# Patient Record
Sex: FEMALE | Race: WHITE | NOT HISPANIC OR LATINO | Employment: OTHER | URBAN - METROPOLITAN AREA
[De-identification: names, ages, dates, MRNs, and addresses within clinical notes are randomized per-mention and may not be internally consistent; named-entity substitution may affect disease eponyms.]

---

## 2018-02-23 ENCOUNTER — OFFICE VISIT CONVERTED (OUTPATIENT)
Dept: FAMILY MEDICINE CLINIC | Facility: CLINIC | Age: 38
End: 2018-02-23
Attending: FAMILY MEDICINE

## 2018-08-21 ENCOUNTER — OFFICE VISIT CONVERTED (OUTPATIENT)
Dept: FAMILY MEDICINE CLINIC | Facility: CLINIC | Age: 38
End: 2018-08-21
Attending: FAMILY MEDICINE

## 2018-12-11 ENCOUNTER — CONVERSION ENCOUNTER (OUTPATIENT)
Dept: GENERAL RADIOLOGY | Facility: HOSPITAL | Age: 38
End: 2018-12-11

## 2019-02-05 ENCOUNTER — CONVERSION ENCOUNTER (OUTPATIENT)
Dept: FAMILY MEDICINE CLINIC | Facility: CLINIC | Age: 39
End: 2019-02-05

## 2019-02-05 ENCOUNTER — OFFICE VISIT CONVERTED (OUTPATIENT)
Dept: FAMILY MEDICINE CLINIC | Facility: CLINIC | Age: 39
End: 2019-02-05
Attending: FAMILY MEDICINE

## 2019-02-28 ENCOUNTER — HOSPITAL ENCOUNTER (OUTPATIENT)
Dept: CARDIOLOGY | Facility: HOSPITAL | Age: 39
Discharge: HOME | End: 2019-02-28
Attending: SURGERY

## 2019-03-14 ENCOUNTER — HOSPITAL ENCOUNTER (OUTPATIENT)
Dept: CARDIOLOGY | Facility: HOSPITAL | Age: 39
Discharge: HOME | End: 2019-03-14

## 2019-08-27 ENCOUNTER — HOSPITAL ENCOUNTER (OUTPATIENT)
Dept: LAB | Facility: HOSPITAL | Age: 39
Discharge: HOME | End: 2019-08-27
Attending: FAMILY MEDICINE

## 2019-08-27 ENCOUNTER — OFFICE VISIT CONVERTED (OUTPATIENT)
Dept: FAMILY MEDICINE CLINIC | Facility: CLINIC | Age: 39
End: 2019-08-27
Attending: FAMILY MEDICINE

## 2019-08-27 LAB
ALBUMIN SERPL-MCNC: 4.3 G/DL (ref 3.5–5)
ALBUMIN/GLOB SERPL: 1.4 {RATIO} (ref 1.4–2.6)
ALP SERPL-CCNC: 79 U/L (ref 42–98)
ALT SERPL-CCNC: 52 U/L (ref 10–40)
ANION GAP SERPL CALC-SCNC: 16 MMOL/L (ref 8–19)
AST SERPL-CCNC: 30 U/L (ref 15–50)
BASOPHILS # BLD AUTO: 0.04 10*3/UL (ref 0–0.2)
BASOPHILS NFR BLD AUTO: 0.7 % (ref 0–3)
BILIRUB SERPL-MCNC: <0.15 MG/DL (ref 0.2–1.3)
BUN SERPL-MCNC: 7 MG/DL (ref 5–25)
BUN/CREAT SERPL: 10 {RATIO} (ref 6–20)
CALCIUM SERPL-MCNC: 9.2 MG/DL (ref 8.7–10.4)
CHLORIDE SERPL-SCNC: 104 MMOL/L (ref 99–111)
CHOLEST SERPL-MCNC: 158 MG/DL (ref 107–200)
CHOLEST/HDLC SERPL: 4.1 {RATIO} (ref 3–6)
CONV ABS IMM GRAN: 0.01 10*3/UL (ref 0–0.2)
CONV CO2: 26 MMOL/L (ref 22–32)
CONV IMMATURE GRAN: 0.2 % (ref 0–1.8)
CONV TOTAL PROTEIN: 7.3 G/DL (ref 6.3–8.2)
CREAT UR-MCNC: 0.69 MG/DL (ref 0.5–0.9)
DEPRECATED RDW RBC AUTO: 44.8 FL (ref 36.4–46.3)
EOSINOPHIL # BLD AUTO: 0.1 10*3/UL (ref 0–0.7)
EOSINOPHIL # BLD AUTO: 1.8 % (ref 0–7)
ERYTHROCYTE [DISTWIDTH] IN BLOOD BY AUTOMATED COUNT: 13.2 % (ref 11.7–14.4)
GFR SERPLBLD BASED ON 1.73 SQ M-ARVRAT: >60 ML/MIN/{1.73_M2}
GLOBULIN UR ELPH-MCNC: 3 G/DL (ref 2–3.5)
GLUCOSE SERPL-MCNC: 91 MG/DL (ref 65–99)
HCT VFR BLD AUTO: 45.6 % (ref 37–47)
HDLC SERPL-MCNC: 39 MG/DL (ref 40–60)
HGB BLD-MCNC: 14.7 G/DL (ref 12–16)
LDLC SERPL CALC-MCNC: 100 MG/DL (ref 70–100)
LYMPHOCYTES # BLD AUTO: 1.9 10*3/UL (ref 1–5)
LYMPHOCYTES NFR BLD AUTO: 34.2 % (ref 20–45)
MCH RBC QN AUTO: 29.8 PG (ref 27–31)
MCHC RBC AUTO-ENTMCNC: 32.2 G/DL (ref 33–37)
MCV RBC AUTO: 92.5 FL (ref 81–99)
MONOCYTES # BLD AUTO: 0.4 10*3/UL (ref 0.2–1.2)
MONOCYTES NFR BLD AUTO: 7.2 % (ref 3–10)
NEUTROPHILS # BLD AUTO: 3.11 10*3/UL (ref 2–8)
NEUTROPHILS NFR BLD AUTO: 55.9 % (ref 30–85)
NRBC CBCN: 0 % (ref 0–0.7)
OSMOLALITY SERPL CALC.SUM OF ELEC: 292 MOSM/KG (ref 273–304)
PLATELET # BLD AUTO: 210 10*3/UL (ref 130–400)
PMV BLD AUTO: 10.1 FL (ref 9.4–12.3)
POTASSIUM SERPL-SCNC: 4.3 MMOL/L (ref 3.5–5.3)
RBC # BLD AUTO: 4.93 10*6/UL (ref 4.2–5.4)
SODIUM SERPL-SCNC: 142 MMOL/L (ref 135–147)
T4 FREE SERPL-MCNC: 1 NG/DL (ref 0.9–1.8)
TRIGL SERPL-MCNC: 94 MG/DL (ref 40–150)
TSH SERPL-ACNC: 1.94 M[IU]/L (ref 0.27–4.2)
VIT B12 SERPL-MCNC: 520 PG/ML (ref 211–911)
VLDLC SERPL-MCNC: 19 MG/DL (ref 5–37)
WBC # BLD AUTO: 5.56 10*3/UL (ref 4.8–10.8)

## 2019-09-17 ENCOUNTER — HOSPITAL ENCOUNTER (OUTPATIENT)
Dept: CARDIOLOGY | Facility: HOSPITAL | Age: 39
Discharge: HOME | End: 2019-09-17

## 2019-10-15 ENCOUNTER — HOSPITAL ENCOUNTER (OUTPATIENT)
Dept: CT IMAGING | Facility: HOSPITAL | Age: 39
Discharge: HOME | End: 2019-10-15

## 2019-10-19 ENCOUNTER — HOSPITAL ENCOUNTER (OUTPATIENT)
Dept: URGENT CARE | Facility: CLINIC | Age: 39
Discharge: HOME | End: 2019-10-19

## 2019-10-21 LAB — BACTERIA SPEC AEROBE CULT: NORMAL

## 2019-10-23 ENCOUNTER — OFFICE VISIT CONVERTED (OUTPATIENT)
Dept: FAMILY MEDICINE CLINIC | Facility: CLINIC | Age: 39
End: 2019-10-23
Attending: NURSE PRACTITIONER

## 2019-10-23 ENCOUNTER — HOSPITAL ENCOUNTER (OUTPATIENT)
Dept: GENERAL RADIOLOGY | Facility: HOSPITAL | Age: 39
Discharge: HOME | End: 2019-10-23
Attending: NURSE PRACTITIONER

## 2020-02-25 ENCOUNTER — HOSPITAL ENCOUNTER (OUTPATIENT)
Dept: LAB | Facility: HOSPITAL | Age: 40
Discharge: HOME | End: 2020-02-25
Attending: INTERNAL MEDICINE

## 2020-02-25 ENCOUNTER — OFFICE VISIT CONVERTED (OUTPATIENT)
Dept: FAMILY MEDICINE CLINIC | Facility: CLINIC | Age: 40
End: 2020-02-25
Attending: INTERNAL MEDICINE

## 2020-02-25 ENCOUNTER — CONVERSION ENCOUNTER (OUTPATIENT)
Dept: FAMILY MEDICINE CLINIC | Facility: CLINIC | Age: 40
End: 2020-02-25

## 2020-02-25 LAB
ALBUMIN SERPL-MCNC: 4.3 G/DL (ref 3.5–5)
ALBUMIN/GLOB SERPL: 1.4 {RATIO} (ref 1.4–2.6)
ALP SERPL-CCNC: 74 U/L (ref 42–98)
ALT SERPL-CCNC: 31 U/L (ref 10–40)
ANION GAP SERPL CALC-SCNC: 17 MMOL/L (ref 8–19)
AST SERPL-CCNC: 21 U/L (ref 15–50)
BILIRUB SERPL-MCNC: 0.21 MG/DL (ref 0.2–1.3)
BUN SERPL-MCNC: 8 MG/DL (ref 5–25)
BUN/CREAT SERPL: 11 {RATIO} (ref 6–20)
CALCIUM SERPL-MCNC: 9.1 MG/DL (ref 8.7–10.4)
CHLORIDE SERPL-SCNC: 103 MMOL/L (ref 99–111)
CHOLEST SERPL-MCNC: 131 MG/DL (ref 107–200)
CHOLEST/HDLC SERPL: 3.9 {RATIO} (ref 3–6)
CONV CO2: 25 MMOL/L (ref 22–32)
CONV TOTAL PROTEIN: 7.4 G/DL (ref 6.3–8.2)
CREAT UR-MCNC: 0.74 MG/DL (ref 0.5–0.9)
GFR SERPLBLD BASED ON 1.73 SQ M-ARVRAT: >60 ML/MIN/{1.73_M2}
GLOBULIN UR ELPH-MCNC: 3.1 G/DL (ref 2–3.5)
GLUCOSE SERPL-MCNC: 95 MG/DL (ref 65–99)
HDLC SERPL-MCNC: 34 MG/DL (ref 40–60)
LDLC SERPL CALC-MCNC: 83 MG/DL (ref 70–100)
OSMOLALITY SERPL CALC.SUM OF ELEC: 288 MOSM/KG (ref 273–304)
POTASSIUM SERPL-SCNC: 4.7 MMOL/L (ref 3.5–5.3)
SODIUM SERPL-SCNC: 140 MMOL/L (ref 135–147)
TRIGL SERPL-MCNC: 69 MG/DL (ref 40–150)
VLDLC SERPL-MCNC: 14 MG/DL (ref 5–37)

## 2020-05-06 ENCOUNTER — OFFICE VISIT CONVERTED (OUTPATIENT)
Dept: CARDIOLOGY | Facility: CLINIC | Age: 40
End: 2020-05-06
Attending: INTERNAL MEDICINE

## 2020-05-12 ENCOUNTER — CONVERSION ENCOUNTER (OUTPATIENT)
Dept: CARDIOLOGY | Facility: CLINIC | Age: 40
End: 2020-05-12
Attending: INTERNAL MEDICINE

## 2020-05-15 ENCOUNTER — HOSPITAL ENCOUNTER (OUTPATIENT)
Dept: NUCLEAR MEDICINE | Facility: HOSPITAL | Age: 40
Discharge: HOME | End: 2020-05-15
Attending: INTERNAL MEDICINE

## 2020-06-30 ENCOUNTER — HOSPITAL ENCOUNTER (OUTPATIENT)
Dept: CARDIOLOGY | Facility: HOSPITAL | Age: 40
Discharge: HOME | End: 2020-06-30
Attending: SURGERY

## 2020-07-01 LAB
ANTI-CARDIOLIPIN IGG ANTIBODY: <9 GPL U/ML (ref 0–14)
ANTI-CARDIOLIPIN IGM ANTIBODY: 13 MPL U/ML (ref 0–12)
CONV CARDIOLIPIN IGA: <9 APL U/ML (ref 0–11)
HCYS SERPL-SCNC: 6.9 UMOL/L (ref 3.7–13.9)

## 2020-07-02 ENCOUNTER — HOSPITAL ENCOUNTER (OUTPATIENT)
Dept: CARDIOLOGY | Facility: HOSPITAL | Age: 40
Discharge: HOME | End: 2020-07-02
Attending: SURGERY

## 2020-07-03 LAB
C3 SERPL-MCNC: 161 MG/DL (ref 88–201)
C4 SERPL-MCNC: 21 MG/DL (ref 10–40)
CONV ANA IGG BY ELISA: NORMAL
RHEUMATOID FACT SERPL-ACNC: <10 IU/ML (ref 0–14)

## 2020-07-05 LAB
PROTEIN C-FUNCTIONAL: 121 % (ref 73–180)
PROTEIN S-FUNCTIONAL: 83 % (ref 63–140)

## 2020-07-07 LAB — F5 GENE MUT ANL BLD/T: NORMAL

## 2020-09-08 ENCOUNTER — OFFICE VISIT CONVERTED (OUTPATIENT)
Dept: FAMILY MEDICINE CLINIC | Facility: CLINIC | Age: 40
End: 2020-09-08
Attending: INTERNAL MEDICINE

## 2020-09-10 ENCOUNTER — HOSPITAL ENCOUNTER (OUTPATIENT)
Dept: LAB | Facility: HOSPITAL | Age: 40
Discharge: HOME | End: 2020-09-10
Attending: INTERNAL MEDICINE

## 2020-09-10 LAB
ALBUMIN SERPL-MCNC: 4.2 G/DL (ref 3.5–5)
ALBUMIN/GLOB SERPL: 1.4 {RATIO} (ref 1.4–2.6)
ALP SERPL-CCNC: 74 U/L (ref 42–98)
ALT SERPL-CCNC: 34 U/L (ref 10–40)
ANION GAP SERPL CALC-SCNC: 16 MMOL/L (ref 8–19)
AST SERPL-CCNC: 28 U/L (ref 15–50)
BILIRUB SERPL-MCNC: 0.28 MG/DL (ref 0.2–1.3)
BUN SERPL-MCNC: 5 MG/DL (ref 5–25)
BUN/CREAT SERPL: 6 {RATIO} (ref 6–20)
CALCIUM SERPL-MCNC: 9.1 MG/DL (ref 8.7–10.4)
CHLORIDE SERPL-SCNC: 105 MMOL/L (ref 99–111)
CHOLEST SERPL-MCNC: 155 MG/DL (ref 107–200)
CHOLEST/HDLC SERPL: 4.2 {RATIO} (ref 3–6)
CONV CO2: 24 MMOL/L (ref 22–32)
CONV TOTAL PROTEIN: 7.2 G/DL (ref 6.3–8.2)
CREAT UR-MCNC: 0.83 MG/DL (ref 0.5–0.9)
GFR SERPLBLD BASED ON 1.73 SQ M-ARVRAT: >60 ML/MIN/{1.73_M2}
GLOBULIN UR ELPH-MCNC: 3 G/DL (ref 2–3.5)
GLUCOSE SERPL-MCNC: 96 MG/DL (ref 65–99)
HDLC SERPL-MCNC: 37 MG/DL (ref 40–60)
LDLC SERPL CALC-MCNC: 100 MG/DL (ref 70–100)
OSMOLALITY SERPL CALC.SUM OF ELEC: 289 MOSM/KG (ref 273–304)
POTASSIUM SERPL-SCNC: 4 MMOL/L (ref 3.5–5.3)
SODIUM SERPL-SCNC: 141 MMOL/L (ref 135–147)
TRIGL SERPL-MCNC: 89 MG/DL (ref 40–150)
VLDLC SERPL-MCNC: 18 MG/DL (ref 5–37)

## 2020-09-14 ENCOUNTER — HOSPITAL ENCOUNTER (OUTPATIENT)
Dept: MAMMOGRAPHY | Facility: HOSPITAL | Age: 40
Discharge: HOME | End: 2020-09-14
Attending: INTERNAL MEDICINE

## 2020-10-08 ENCOUNTER — OFFICE VISIT CONVERTED (OUTPATIENT)
Dept: FAMILY MEDICINE CLINIC | Facility: CLINIC | Age: 40
End: 2020-10-08
Attending: INTERNAL MEDICINE

## 2020-11-19 ENCOUNTER — OFFICE VISIT CONVERTED (OUTPATIENT)
Dept: FAMILY MEDICINE CLINIC | Facility: CLINIC | Age: 40
End: 2020-11-19
Attending: INTERNAL MEDICINE

## 2020-11-19 ENCOUNTER — CONVERSION ENCOUNTER (OUTPATIENT)
Dept: FAMILY MEDICINE CLINIC | Facility: CLINIC | Age: 40
End: 2020-11-19

## 2020-12-07 ENCOUNTER — OFFICE VISIT CONVERTED (OUTPATIENT)
Dept: FAMILY MEDICINE CLINIC | Facility: CLINIC | Age: 40
End: 2020-12-07
Attending: PHYSICIAN ASSISTANT

## 2020-12-14 ENCOUNTER — HOSPITAL ENCOUNTER (OUTPATIENT)
Dept: CARDIOLOGY | Facility: HOSPITAL | Age: 40
Discharge: HOME | End: 2020-12-14
Attending: SURGERY

## 2020-12-21 ENCOUNTER — HOSPITAL ENCOUNTER (OUTPATIENT)
Dept: CT IMAGING | Facility: HOSPITAL | Age: 40
Discharge: HOME | End: 2020-12-21
Attending: SURGERY

## 2021-01-11 ENCOUNTER — HOSPITAL ENCOUNTER (OUTPATIENT)
Dept: CARDIOLOGY | Facility: HOSPITAL | Age: 41
Discharge: HOME | End: 2021-01-11
Attending: SURGERY

## 2021-01-13 ENCOUNTER — OFFICE VISIT CONVERTED (OUTPATIENT)
Dept: FAMILY MEDICINE CLINIC | Facility: CLINIC | Age: 41
End: 2021-01-13
Attending: INTERNAL MEDICINE

## 2021-01-13 ENCOUNTER — CONVERSION ENCOUNTER (OUTPATIENT)
Dept: FAMILY MEDICINE CLINIC | Facility: CLINIC | Age: 41
End: 2021-01-13

## 2021-02-19 ENCOUNTER — OFFICE VISIT CONVERTED (OUTPATIENT)
Dept: FAMILY MEDICINE CLINIC | Facility: CLINIC | Age: 41
End: 2021-02-19
Attending: NURSE PRACTITIONER

## 2021-02-19 ENCOUNTER — CONVERSION ENCOUNTER (OUTPATIENT)
Dept: FAMILY MEDICINE CLINIC | Facility: CLINIC | Age: 41
End: 2021-02-19

## 2021-02-19 ENCOUNTER — HOSPITAL ENCOUNTER (OUTPATIENT)
Dept: FAMILY MEDICINE CLINIC | Facility: CLINIC | Age: 41
Discharge: HOME | End: 2021-02-19
Attending: NURSE PRACTITIONER

## 2021-02-19 LAB
APPEARANCE UR: CLEAR
BILIRUB UR QL STRIP: NEGATIVE
BILIRUB UR QL: NEGATIVE
COLOR UR: YELLOW
COLOR UR: YELLOW
CONV BACTERIA: NEGATIVE
CONV CLARITY OF URINE: CLEAR
CONV COLLECTION SOURCE (UA): ABNORMAL
CONV PROTEIN IN URINE BY AUTOMATED TEST STRIP: NEGATIVE
CONV UROBILINOGEN IN URINE BY AUTOMATED TEST STRIP: 0.2 {EHRLICHU}/DL (ref 0.1–1)
CONV UROBILINOGEN IN URINE BY AUTOMATED TEST STRIP: NORMAL
GLUCOSE UR QL: NEGATIVE
GLUCOSE UR QL: NEGATIVE MG/DL
HGB UR QL STRIP: ABNORMAL
HGB UR QL STRIP: NORMAL
KETONES UR QL STRIP: NEGATIVE
KETONES UR QL STRIP: NEGATIVE MG/DL
LEUKOCYTE ESTERASE UR QL STRIP: NEGATIVE
LEUKOCYTE ESTERASE UR QL STRIP: NEGATIVE
NITRITE UR QL STRIP: NEGATIVE
NITRITE UR QL STRIP: NEGATIVE
PH UR STRIP.AUTO: 5.5 [PH]
PH UR STRIP.AUTO: 5.5 [PH] (ref 5–8)
PROT UR QL: NEGATIVE MG/DL
RBC #/AREA URNS HPF: ABNORMAL /[HPF]
SP GR UR: 1.02 (ref 1–1.03)
SP GR UR: 1.03
SQUAMOUS SPT QL MICRO: ABNORMAL /[HPF]
WBC #/AREA URNS HPF: ABNORMAL /[HPF]

## 2021-02-21 LAB — BACTERIA UR CULT: NORMAL

## 2021-02-23 ENCOUNTER — HOSPITAL ENCOUNTER (OUTPATIENT)
Dept: LAB | Facility: HOSPITAL | Age: 41
Discharge: HOME | End: 2021-02-23
Attending: NURSE PRACTITIONER

## 2021-02-23 LAB
25(OH)D3 SERPL-MCNC: 15.4 NG/ML (ref 30–100)
ALBUMIN SERPL-MCNC: 4.1 G/DL (ref 3.5–5)
ALBUMIN/GLOB SERPL: 1.5 {RATIO} (ref 1.4–2.6)
ALP SERPL-CCNC: 69 U/L (ref 42–98)
ALT SERPL-CCNC: 27 U/L (ref 10–40)
ANION GAP SERPL CALC-SCNC: 10 MMOL/L (ref 8–19)
AST SERPL-CCNC: 21 U/L (ref 15–50)
BASOPHILS # BLD AUTO: 0.03 10*3/UL (ref 0–0.2)
BASOPHILS NFR BLD AUTO: 0.7 % (ref 0–3)
BILIRUB SERPL-MCNC: 0.19 MG/DL (ref 0.2–1.3)
BUN SERPL-MCNC: 7 MG/DL (ref 5–25)
BUN/CREAT SERPL: 10 {RATIO} (ref 6–20)
CALCIUM SERPL-MCNC: 8.5 MG/DL (ref 8.7–10.4)
CHLORIDE SERPL-SCNC: 106 MMOL/L (ref 99–111)
CHOLEST SERPL-MCNC: 148 MG/DL (ref 107–200)
CHOLEST/HDLC SERPL: 4 {RATIO} (ref 3–6)
CONV ABS IMM GRAN: 0.01 10*3/UL (ref 0–0.2)
CONV CO2: 24 MMOL/L (ref 22–32)
CONV IMMATURE GRAN: 0.2 % (ref 0–1.8)
CONV TOTAL PROTEIN: 6.9 G/DL (ref 6.3–8.2)
CREAT UR-MCNC: 0.73 MG/DL (ref 0.5–0.9)
DEPRECATED RDW RBC AUTO: 42.7 FL (ref 36.4–46.3)
EOSINOPHIL # BLD AUTO: 0.07 10*3/UL (ref 0–0.7)
EOSINOPHIL # BLD AUTO: 1.5 % (ref 0–7)
ERYTHROCYTE [DISTWIDTH] IN BLOOD BY AUTOMATED COUNT: 12.6 % (ref 11.7–14.4)
GFR SERPLBLD BASED ON 1.73 SQ M-ARVRAT: >60 ML/MIN/{1.73_M2}
GLOBULIN UR ELPH-MCNC: 2.8 G/DL (ref 2–3.5)
GLUCOSE SERPL-MCNC: 93 MG/DL (ref 65–99)
HCT VFR BLD AUTO: 43.6 % (ref 37–47)
HDLC SERPL-MCNC: 37 MG/DL (ref 40–60)
HGB BLD-MCNC: 14.2 G/DL (ref 12–16)
LDLC SERPL CALC-MCNC: 95 MG/DL (ref 70–100)
LYMPHOCYTES # BLD AUTO: 1.53 10*3/UL (ref 1–5)
LYMPHOCYTES NFR BLD AUTO: 33.2 % (ref 20–45)
MCH RBC QN AUTO: 30 PG (ref 27–31)
MCHC RBC AUTO-ENTMCNC: 32.6 G/DL (ref 33–37)
MCV RBC AUTO: 92 FL (ref 81–99)
MONOCYTES # BLD AUTO: 0.33 10*3/UL (ref 0.2–1.2)
MONOCYTES NFR BLD AUTO: 7.2 % (ref 3–10)
NEUTROPHILS # BLD AUTO: 2.64 10*3/UL (ref 2–8)
NEUTROPHILS NFR BLD AUTO: 57.2 % (ref 30–85)
NRBC CBCN: 0 % (ref 0–0.7)
OSMOLALITY SERPL CALC.SUM OF ELEC: 280 MOSM/KG (ref 273–304)
PLATELET # BLD AUTO: 220 10*3/UL (ref 130–400)
PMV BLD AUTO: 10.6 FL (ref 9.4–12.3)
POTASSIUM SERPL-SCNC: 4.3 MMOL/L (ref 3.5–5.3)
RBC # BLD AUTO: 4.74 10*6/UL (ref 4.2–5.4)
SODIUM SERPL-SCNC: 136 MMOL/L (ref 135–147)
TRIGL SERPL-MCNC: 79 MG/DL (ref 40–150)
TSH SERPL-ACNC: 2.3 M[IU]/L (ref 0.27–4.2)
VIT B12 SERPL-MCNC: 430 PG/ML (ref 211–911)
VLDLC SERPL-MCNC: 16 MG/DL (ref 5–37)
WBC # BLD AUTO: 4.61 10*3/UL (ref 4.8–10.8)

## 2021-03-01 ENCOUNTER — HOSPITAL ENCOUNTER (OUTPATIENT)
Dept: FAMILY MEDICINE CLINIC | Facility: CLINIC | Age: 41
Discharge: HOME | End: 2021-03-01
Attending: NURSE PRACTITIONER

## 2021-03-01 ENCOUNTER — CONVERSION ENCOUNTER (OUTPATIENT)
Dept: FAMILY MEDICINE CLINIC | Facility: CLINIC | Age: 41
End: 2021-03-01

## 2021-03-01 LAB
BILIRUB UR QL STRIP: NEGATIVE
COLOR UR: YELLOW
CONV CLARITY OF URINE: CLEAR
CONV PROTEIN IN URINE BY AUTOMATED TEST STRIP: NEGATIVE
CONV UROBILINOGEN IN URINE BY AUTOMATED TEST STRIP: NORMAL
GLUCOSE UR QL: NEGATIVE
HGB UR QL STRIP: NORMAL
KETONES UR QL STRIP: NEGATIVE
LEUKOCYTE ESTERASE UR QL STRIP: NEGATIVE
NITRITE UR QL STRIP: NEGATIVE
PH UR STRIP.AUTO: 5.5 [PH]
SP GR UR: 1.02

## 2021-03-03 LAB — BACTERIA UR CULT: NORMAL

## 2021-03-11 ENCOUNTER — HOSPITAL ENCOUNTER (OUTPATIENT)
Dept: GENERAL RADIOLOGY | Facility: HOSPITAL | Age: 41
Discharge: HOME | End: 2021-03-11
Attending: NURSE PRACTITIONER

## 2021-04-28 ENCOUNTER — CONVERSION ENCOUNTER (OUTPATIENT)
Dept: SURGERY | Facility: CLINIC | Age: 41
End: 2021-04-28

## 2021-04-28 ENCOUNTER — OFFICE VISIT CONVERTED (OUTPATIENT)
Dept: UROLOGY | Facility: CLINIC | Age: 41
End: 2021-04-28
Attending: UROLOGY

## 2021-04-28 ENCOUNTER — HOSPITAL ENCOUNTER (OUTPATIENT)
Dept: SURGERY | Facility: CLINIC | Age: 41
Discharge: HOME | End: 2021-04-28
Attending: UROLOGY

## 2021-04-28 LAB
BILIRUB UR QL STRIP: NEGATIVE
COLOR UR: YELLOW
CONV BACTERIA IN URINE MICRO: 0
CONV CALCIUM OXALATE CRYSTALS /HPF IN URINE SEDIMENT BY MICROSCOPY: 0
CONV CLARITY OF URINE: CLEAR
CONV PROTEIN IN URINE BY AUTOMATED TEST STRIP: NEGATIVE
CONV UROBILINOGEN IN URINE BY AUTOMATED TEST STRIP: 0.2
GLUCOSE UR QL: NEGATIVE
HGB UR QL STRIP: NORMAL
KETONES UR QL STRIP: NEGATIVE
LEUKOCYTE ESTERASE UR QL STRIP: NEGATIVE
NITRITE UR QL STRIP: NEGATIVE
PH UR STRIP.AUTO: 5 [PH]
RBC #/AREA URNS HPF: NORMAL /[HPF]
RENAL EPI CELLS #/AREA URNS HPF: 0 /[HPF]
SP GR UR: 1.02
SQUAMOUS SPT QL MICRO: 0
WBC #/AREA URNS HPF: 0 /[HPF]

## 2021-04-30 LAB — BACTERIA UR CULT: NORMAL

## 2021-05-06 ENCOUNTER — HOSPITAL ENCOUNTER (OUTPATIENT)
Dept: OTHER | Facility: HOSPITAL | Age: 41
Discharge: HOME | End: 2021-05-06
Attending: UROLOGY

## 2021-05-06 LAB — CORTIS AM PEAK SERPL-MCNC: 5.3 UG/DL (ref 6–18.4)

## 2021-05-10 NOTE — PROCEDURES
"   Procedure Note      Patient Name: Janki Krueger   Patient ID: [id number]   Sex: Female   YOB: 1980    Primary Care Provider: Pablo Cristobal DO   Referring Provider: Pablo Cristobal DO    Visit Date: May 12, 2020    Provider: Brandon Perry MD   Location: [facility]   Location Address: [address] [place] [address], [address]   [place]  [id number]   Location Phone: [phone]          FINAL REPORT   TRANSTHORACIC ECHOCARDIOGRAM REPORT    Diagnosis: Chest pain, precordial   Height: 5'4\" Weight: 212 B/P: BLOOD PRESSURE BSA: 2.01   Tech: [facility]   MEASUREMENTS:  RVID (Diastole) : RVID. (NORMAL: 0.7 to 2.4 cm max)   LVID (Systole): 3.1 cm (Diastole): 4.8 cm. (NORMAL: 3.7 - 5.4 cm)   Posterior Wall Thickness (Diastole): 0.7 cm. (NORMAL: 0.8 - 1.1 cm)   Septal Thickness (Diastole): 0.7 cm. (NORMAL: 0.7 - 1.2 cm)   LAID (Systole): 3.1 cm. (NORMAL: 1.9 - 3.8 cm)   Aortic Root Diameter (Diastole): 2.9 cm. (NORMAL: 2.0 - 3.7 cm)   MITRL.VLV.CALVIN.D.D.R: 80 mm/sec-150 mm/sec   DOPPLER: Continuous-wave, pulse-wave, and color-flow examination of the mitral, aortic, and tricuspid valves was performed. There was trace mitral regurgitation. No significant stenosis was identified. Doppler flow velocities were normal. E/A ratio is 1.1. DT= 239 msec. IVRT is 116 msec. E/E' is 0.9.   COMMENTS:  The patient underwent 2-D, M-Mode, and Doppler examination, including pulse-wave, continuous-wave, and color-flow Doppler analysis; the study is technically adequate. The following was observed:   FINDINGS:  AORTIC VALVE: Appeared to be normal. Trileaflet with central closure point. No evidence of any obstruction. No regurgitation.   MITRAL VALVE: Appeared to be normal. No evidence of any obstruction. No regurgitation.   TRICUSPID VALVE: Appeared to be normal.   PULMONIC VALVE: Not well seen.   LEFT ATRIUM: Appeared to be normal. No intracavity masses or clots seen. LA volume index is 16 mL/m2.   AORTIC ROOT: " Appeared to be normal in size.   LEFT VENTRICLE: Appeared to have an overall normal left ventricular systolic function with a normal EF of 55%. There is no evidence of any wall motion abnormalities. No cavitary dilatation. Normal wall thickness.   RIGHT ATRIUM: Appeared to be normal; no obvious evidence of intracavity masses or clots.   RIGHT VENTRICLE: Normal size and function.   PERICARDIUM: Unremarkable. No evidence of effusion.   INFERIOR VENA CAVA: Diameter is 1.1 cm.   Fax: 05/14/2020      CONCLUSION:  1.  Normal ejection fraction of 55%.   2.  Trace mitral regurgitation.       MD CARMEN Sheikh/chandni    This note was transcribed by Nina De.  chandni/carmen  The above service was transcribed by Nina De, and I attest to the accuracy of the note.                   Electronically Signed by: Kelly De-, Other -Author on May 14, 2020 07:56:31 AM  Electronically Co-signed by: Brandon Perry MD -Reviewer on May 20, 2020 03:55:16 PM

## 2021-05-10 NOTE — H&P
History and Physical      Patient Name: Janki Krueger   Patient ID: [id number]   Sex: Female   YOB: 1980    Primary Care Provider: Pablo Cristobal DO   Referring Provider: Pablo Cristobal DO    Visit Date: May 6, 2020    Provider: Brandon Perry MD   Location: [facility]   Location Address: [address] [place] [address], [facility]   [place]  [id number]   Location Phone: [phone]          Chief Complaint  · Chest pain       History Of Present Illness  Consult requested by: Pablo Cristobal DO   Janki Krueger is a 39 year old /White female with chest discomfort on the left side radiating to the arm lasting for minutes, sharp, a 3 out of 10 in intensity, not related to exertion.   PAST MEDICAL HISTORY: Significant for hyperlipidemia, peripheral vascular disease with previous bypass in 2014 as well as graft revisions in 2016 and 2017 with stenting done in the lower extremity, but she does not remember which side.   FAMILY MEDICAL HISTORY: Not significant for premature coronary artery disease.   PSYCHOSOCIAL HISTORY: She is a prior smoker. She uses alcohol rarely and a moderate amount of caffeine.   CURRENT MEDICATIONS: include Gabapentin 300 mg daily; Norco; Pravastatin 40 mg daily; aspirin 81 mg daily; Tylenol PM. The dosage and frequency of the medications were reviewed with the patient.   ALLERGIES: Morphine.       Review of Systems  · Constitutional  o Admits  o : fatigue  o Denies  o : good general health lately, recent weight changes   · Eyes  o Denies  o : double vision  · HENT  o Denies  o : hearing loss or ringing, chronic sinus problem, swollen glands in neck  · Cardiovascular  o Admits  o : chest pain, palpitations (fast, fluttering, or skipping beats), shortness of breath while walking or lying flat  o Denies  o : swelling (feet, ankles, hands)  · Respiratory  o Denies  o : asthma or wheezing, COPD  · Gastrointestinal  o Denies  o : ulcers, nausea or  "vomiting  · Neurologic  o Denies  o : lightheaded or dizzy, stroke, headaches  · Musculoskeletal  o Denies  o : joint pain, back pain  · Endocrine  o Denies  o : thyroid disease, diabetes, heat or cold intolerance, excessive thirst or urination  · Heme-Lymph  o Denies  o : bleeding or bruising tendency, anemia      Vitals  Date Time BP Position Site L\R Cuff Size HR RR TEMP (F) WT  HT  BMI kg/m2 BSA m2 O2 Sat HC       05/06/2020 12:44 /90 Sitting    88 - R   212lbs 0oz 5'  4\" 36.39 2.08     05/06/2020 12:44 /94 Sitting    90 - R                 Physical Examination  · Constitutional  o Appearance  o : Awake, alert, in no acute distress.   · Head and Face  o HEENT  o : PERRLA.  · Eyes  o Conjunctivae  o : Normal.  · Ears, Nose, Mouth and Throat  o Oral Cavity  o :   § Oral Mucosa  § : Normal.  · Neck  o Inspection/Palpation  o : No JVD.  · Respiratory  o Respiratory  o : Chest is symmetrical. Lung fields are clear. No rhonchi or wheezes heard.  · Cardiovascular  o Heart  o :   § Auscultation of Heart  § : S1, S2 are normal. Regular rate and rhythm without murmurs, gallops, or rubs.  o Peripheral Vascular System  o :   § Extremities  § : Nails normal. No clubbing or cyanosis. Femoral pulses adequate. Pedal pulses adequate. No peripheral edema.  · Gastrointestinal  o Abdominal Examination  o : Abdomen soft. No masses. No guarding or rigidity. No hepatosplenomegaly. Bowel sounds normal.  · Musculoskeletal  o General  o :   § General Musculoskeletal  § : Muscle tone and strength were normal.  · Skin and Subcutaneous Tissue  o General Inspection  o : Unremarkable.     EKG was performed in the office today.  Indication:       chest pain.  Results:           normal sinus rhythm.  Comparison:    No prior EKGs to compare to.    LDL 83, HDL 34.  Creatinine level was 0.7.             Assessment     ASSESSMENT AND PLAN:    1.  Chest pain:  Atypical in nature, but with patient's known peripheral vascular disease, " would recommend       both a stress test and echocardiogram to evaluate for possible atypical ischemic heart disease presentation.       Patient is already on chronic aspirin 81 mg once a day.  Would recommend intensifying her lipid therapy for       a goal LDL of less than 70.  2.  Dyslipidemia:  Will recommend changing from Pravastatin to Crestor 10 mg qd.  Repeat lipids and LFTs in 3       months.  Counseled her on possible side effects, including myalgias.  3.  Hypertension:  Borderline controlled.  Recommended keeping a home blood pressure log.    MD Carmen Sheikh/jose cruz         This note was transcribed by Meg Hook.  jose cruz/carmen   The above service was transcribed by Meg Hook, and I attest to the accuracy of the note.  CARMEN.               Electronically Signed by: Meg Hook-, -Author on May 11, 2020 10:52:46 AM  Electronically Co-signed by: Brandon Perry MD -Reviewer on May 13, 2020 12:20:37 PM

## 2021-05-11 NOTE — H&P
History and Physical      Patient Name: Janki Krueger   Patient ID: [id number]   Sex: Female   YOB: 1980    Primary Care Provider: Caitlin FINNEY   Referring Provider: Caitlin FINNEY    Visit Date: April 28, 2021    Provider: Amanda Frost MD   Location: [facility]   Location Address: [address] [place] [address]  [id number]   Location Phone: [phone]          Chief Complaint  · Patient here for urological concerns      History Of Present Illness  The patient is a 40 year old /White female, who is a consultation from Caitlin FINNEY, for the evaluation of microhematuria. The patient was found to have microhematuria on an urinalysis approximately months ago.     She states the color of her urine has been grossly clear. The patient also reports frequency and foul smelling urine. She denies dysuria, back pain, fever, chills, nausea, vomiting, and weight loss.     She states that there is no history of recent abdominal or flank trauma. The patient's past medical history is noncontributory.     The patient has had a CT scan that revealed a right 1.5 cm lipid rich adenoma and a small left punctate renal stone.       Past Medical History  High cholesterol; Insomnia; Ischemia of foot; Neuropathic pain of foot, right; Pap smear for cervical cancer screening; Peripheral vascular disease; Screening for breast cancer; Urticaria         Past Surgical History  *Other; C-sections; Stent placment         Medication List  Ativan 0.5 mg oral tablet; Enteric Coated Aspirin 81 mg oral tablet,delayed release (DR/EC); Neurontin 300 mg oral capsule; Norco 7.5-325 mg oral tablet; Plavix 75 mg oral tablet; pravastatin 80 mg oral tablet; Tylenol PM Extra Strength  mg oral tablet; Vitamin D2 1,250 mcg (50,000 unit) oral capsule         Allergy List  NO KNOWN DRUG ALLERGIES       Allergies Reconciled  Family Medical History  Lung Neoplasm, Malignant; Diabetes,  "unspecified type; Throat Cancer; Heart Attack (MI); Prostate cancer         Reproductive History   2 Para 1 0 1 1       Social History  Active but no formal exercise; Alcohol (Never); ; No known infection risk; Tobacco (Former)         Immunizations  Name Date Admin   Influenza Refused   Tdap Deferred         Review of Systems  · Constitutional  o Denies  o : fatigue, fever, chills, night sweats      Vitals  Date Time BP Position Site L\R Cuff Size HR RR TEMP (F) WT  HT  BMI kg/m2 BSA m2 O2 Sat FR L/min FiO2 HC       2021 03:24 /74 Sitting       215lbs 4oz 5'  2\" 39.37 2.07             Physical Examination  · Constitutional  o Appearance  o : Well nourished, well developed patient in no acute distress. Ambulating without difficulty.  · Respiratory  o Respiratory Effort  o : Breathing is unlabored without accessory muscle use  · Skin and Subcutaneous Tissue  o General Inspection  o : No rashes, lesions or areas of discoloration present. Skin turgor is normal.  o General Palpation  o : No abnormalities, masses or tenderness on palpation.          Results  · In-Office Procedures  o Lab procedure  § Automated dipstick urinalysis with microscopy (56494)   § Color Ur: Yellow   § Clarity Ur: Clear   § Glucose Ur Ql Strip: Negative   § Bilirub Ur Ql Strip: Negative   § Ketones Ur Ql Strip: Negative   § Sp Gr Ur Qn: 1.025   § Hgb Ur Ql Strip: Moderate   § pH Ur-LsCnc: 5.0   § Prot Ur Ql Strip: Negative   § Urobilinogen Ur Strip-mCnc: 0.2   § Nitrite Ur Ql Strip: Negative   § WBC Est Ur Ql Strip: Negative   § RBC UrnS Qn HPF: 10-20   § WBC UrnS Qn HPF: 0   § Bacteria UrnS Qn HPF: 0   § Crystals UrnS Qn HPF: 0   § Epithelial Cells (non renal): 0 /HPF  § Epithelial Cells (renal): 0       Assessment  · Microscopic hematuria     599.72/R31.29  · Adrenal adenoma, right     227.0/D35.01    Problems Reconciled  Plan  · Orders  o Plasma free metanephrines measurement (52174) - 599.72/R31.29, 227.0/D35.01 - " 04/28/2021  o Aldosterone level (21598) - 599.72/R31.29, 227.0/D35.01 - 04/28/2021  o Renin level (27669) - 599.72/R31.29, 227.0/D35.01 - 04/28/2021  o Cortisol (Total) (70875) - 599.72/R31.29, 227.0/D35.01 - 04/28/2021  · Medications  o Medications have been Reconciled  o Transition of Care or Provider Policy  · Instructions  o DISCUSSION:  o The patient has documented hematuria. I have discussed the etiologies of hematuria and the options for management and treatment. I have outlined my recommendation for this problem. The patient is in agreement with the plans. The patient is aware that if the workup is not completed, there is a chance that a malignancy may go undetected and may lead to morbidity or mortality. She has had a CT scan and will need a cystoscopy in the office. I am also getting labs to work up her adrenal adenoma.   o Schedule cystoscopy            Electronically Signed by: Amanda Frost MD -Author on April 28, 2021 04:00:47 PM

## 2021-05-12 LAB — RENIN PLAS-CCNC: 0.4 NG/ML/HR (ref 0.17–5.38)

## 2021-05-13 NOTE — PROGRESS NOTES
Progress Note      Patient Name: Janki Krueger   Patient ID: [id number]   Sex: Female   YOB: 1980    Primary Care Provider: Pablo Cristobal DO   Referring Provider: Pablo Crisotbal DO    Visit Date: October 8, 2020    Provider: Pablo Cristobal DO   [facility]   Location Address: [address] [place] [address], [address]  [place]  [id number]   Location Phone: [phone]          Chief Complaint  · 1 month follow up      History Of Present Illness  Janki Krueger is a 40 year old /White female who presents for evaluation and treatment of:      Patient is here for a one month follow up to discuss recent medication and refill.     Lexapro has done nothing for patient, negative or positive.  Patient states anxiety is still present and not improved. Patient great stress related to dementia of her grandmother, her health, and recent loss of her aunt.    Requesting refill on Norco, prn for pain. Patient states for joint pain and pain associated with claudication from PVD is helpful.       Past Medical History  Disease Name Date Onset Notes   High cholesterol --  --    Insomnia --  --    Ischemia of foot 05/01/2016 --    Neuropathic pain of foot, right 03/14/2016 --    Peripheral vascular disease 12/13/2014 --    Screening for breast cancer 12/2018 abnl left breast U/S normal    Urticaria 07/25/2016 --          Past Surgical History  Procedure Name Date Notes   *Other 2014 aortabifemoral bypass   C-sections 2005 --          Medication List  Name Date Started Instructions   Enteric Coated Aspirin 81 mg oral tablet,delayed release (DR/EC) 02/23/2018 take 1 tablet (81 mg) by oral route once daily for 90 days   Neurontin 300 mg oral capsule 06/19/2020 take 1 capsule (300 mg) by oral route at hs   [place] 7.5-325 mg oral tablet 09/08/2020 one po TID PRN for severe pain (8-10)   Plavix 75 mg oral tablet  take 1 tablet (75 mg) by oral route once daily   pravastatin  80 mg oral tablet 2020 take 1 tablet (80 mg) by oral route once daily   Tylenol PM Extra Strength  mg oral tablet  one po HS         Allergy List  Allergen Name Date Reaction Notes   NO KNOWN DRUG ALLERGIES --  --  --        Allergies Reconciled  Family Medical History  Disease Name Relative/Age Notes   Lung Neoplasm, Malignant Grandmother (paternal)/   --    Diabetes, unspecified type Father/   --    Throat Cancer Grandmother (paternal)/   --    Heart Attack (MI) Father/   --    Prostate cancer Grandfather (maternal)/   --          Reproductive History  Menstrual   Age Menarche: 10   Pregnancy Summary   Total Pregnancies: 2 Full Term: 1 Premature: 0   Ab Induced: 0 Ab Spontaneous: 1 Ectopics: 0   Multiples: 0 Livin         Social History  Finding Status Start/Stop Quantity Notes   Active but no formal exercise --  --/-- --  --    Alcohol Never --/-- --  10/23/2019 -     --  --/-- --  --    No known infection risk --  --/-- --  --    Tobacco Former  pk Quit after surgery in Dec.         Review of Systems  · Constitutional  o Admits  o : fatigue  o Denies  o : night sweats  · Eyes  o Denies  o : double vision, blurred vision  · HENT  o Denies  o : vertigo, recent head injury  · Cardiovascular  o Admits  o : claudication  o Denies  o : chest pain, irregular heart beats  · Respiratory  o Denies  o : shortness of breath, productive cough  · Gastrointestinal  o Denies  o : nausea, vomiting  · Genitourinary  o Denies  o : dysuria, urinary retention  · Integument  o Denies  o : hair growth change, new skin lesions  · Neurologic  o Denies  o : altered mental status, seizures  · Musculoskeletal  o Admits  o : joint pain, back pain  o Denies  o : joint swelling, limitation of motion  · Endocrine  o Denies  o : cold intolerance, heat intolerance  · Psychiatric  o Admits  o : anxiety, depression  o Denies  o : suicidal ideation, homicidal ideation  · Heme-Lymph  o Denies  o : petechiae, lymph  "node enlargement or tenderness  · Allergic-Immunologic  o Denies  o : frequent illnesses      Vitals  Date Time BP Position Site L\R Cuff Size HR RR TEMP (F) WT  HT  BMI kg/m2 BSA m2 O2 Sat FR L/min FiO2 HC       05/06/2020 12:44 /90 Sitting    88 - R   212lbs 0oz 5'  4\" 36.39 2.08       09/08/2020 09:47 /67 Sitting    91 - R   211lbs 6oz 5'  3\" 37.44 2.06 99 %  21%    10/08/2020 08:58 /68 Sitting    97 - R  97.9 211lbs 8oz 5'  3\" 37.47 2.07             Physical Examination  · Constitutional  o Appearance  o : alert, oriented, in no acute distress, well developed, well-nourished  · Eyes  o Vision  o : Conjuntivae: Normal, Sclerae white, Pupils: PERRL, Cornea: Clear, no lesions bilateral  · Ears, Nose, Mouth and Throat  o Ears  o : Ext. Ears: Normal shape, Non tender, EACs: Normal , Tragus intact bilaterally, Hearing: intact to conversational voice bilaterally  o Nose  o : No nasal discharge, Mucosa: normal, Septum: midline, Sinuses: Nontender  o Throat  o : Oropharynx: no inflmation or lesions, no purulence or drainage  · Neck  o Inspection/Palpation  o : Supple, no masses or tenderness, no deformities, Trachea: Midline, ROM: with in normal limits, no neck stiffness, no lymphadenopathy  o Thyroid  o : no thyromegaly, no palpabale masses  · Respiratory  o Auscultation of Lungs  o : normal breath sounds throughout, no wheeze, rhonchi, or crackles  · Cardiovascular  o Heart  o : Regular rate and rhythm, Normal S1,S2 , No cardiac murmers, No S3 or S4 gallop or rubs  · Gastrointestinal  o Abdominal Examination  o : abdomen soft, nontender, non distended, no rigidity, gaurding, rebound tenderness, no ventral hernias present  o Liver and spleen  o : no hepatomegaly present, liver nontender to palpation, spleen not palpable  · Musculoskeletal  o General  o :   § General Musculoskeletal  § : No joint swelling or deformity., Muscle tone, strength, and development grossly normal.  · Skin and Subcutaneous " Tissue  o General Inspection  o : no rashes on visible skin, normal skin color, warm and dry  o Digits and Nails  o : no clubbing, cyanosis, deformities or edema present, normal appearing nails  · Neurologic  o Mental Status Examination  o : alert and oriented to time, place, and person. Gait and Station: normal gait, able to stand without difficulty. CN 2-12 grossly intact   · Psychiatric  o Judgement and Insight  o : judgment and insight intact  o Mood and Affect  o : normal mood and affect          Assessment  · Hyperlipidemia     272.4/E78.5  · Anxiety and depression       Anxiety disorder, unspecified     300.00/F41.9  Major depressive disorder, single episode, unspecified     300.00/F32.9  Titrate off Lexapro 5 mg once daily for two weeks then stop. Will start Effexor 37.5 mg once daily for a week then increase to 75 mg once daily.  · Chronic pain disorder     338.4/G89.4  · PVD (peripheral vascular disease)     443.9/I73.9  · Chronic low back pain       Low back pain     724.2/M54.5  Other chronic pain     724.2/G89.29  · Claudication     443.9/I73.9  · Peripheral neuropathy     356.9/G62.9    Problems Reconciled  Plan  · Orders  o ACO-39: Current medications updated and reviewed (, 1159F) - - 10/08/2020  o ACO-14: Influenza immunization was not administered for reasons documented [facility] () - - 10/08/2020   pt refused  · Medications  o Effexor XR 37.5 mg oral capsule,extended release 24hr   SIG: take 1 capsule (37.5 mg) by oral route once daily with food for 7 days   DISP: (7) Capsule with 0 refills  Prescribed on 10/08/2020     o Effexor XR 75 mg oral capsule,extended release 24hr   SIG: take 1 capsule (75 mg) by oral route once daily with food for 30 days   DISP: (30) Capsule with 5 refills  Prescribed on 10/08/2020     o Norco 7.5-325 mg oral tablet   SIG: one po TID PRN for severe pain (8-10)   DISP: (90) Tablet with 0 refills  Refilled on 10/08/2020     o Lexapro 10 mg oral tablet   SIG: take 1  tablet (10 mg) by oral route once daily for 30 days   DISP: (30) tablets with 5 refills  Discontinued on 10/08/2020     o Medications have been Reconciled  o Transition of Care or Provider Policy  · Instructions  o Recommended exercise program to assist with cholesterol, weight loss and overall health improvement.  o Advised that cheeses and other sources of dairy fats, animal fats, fast food, and the extras (candy, pastries, pies, doughnuts and cookies) all contain LDL raising nutrients. Advised to increase fruits, vegetables, whole grains, and to monitor portion sizes.   o Patient was educated/instructed on their diagnosis, treatment and medications prior to discharge from the clinic today.  o Patient instructed to seek medical attention urgently for new or worsening symptoms.  o Call the office with any concerns or questions.  o Minutes spent with patient including greater than 50% in Education/Counseling/Care Coordination.  o Time spent with the patient was minutes, more than 50% face to face.  o Chronic conditions reviewed and taken into consideration for today's treatment plan.  o Discussed Covid-19 precautions including, but not limited to, social distancing, avoid touching your face, and hand washing.   · Disposition  o Follow up in three months            Electronically Signed by: Pablo Cristobal DO -Author on October 8, 2020 11:04:39 AM

## 2021-05-13 NOTE — PROGRESS NOTES
"   Progress Note      Patient Name: Janki Krueger   Patient ID: [id number]   Sex: Female   YOB: 1980    Primary Care Provider: Pablo Cristobal DO   Referring Provider: Pablo Cristobal DO    Visit Date: December 7, 2020    Provider: ANNIE Horne   [place] [facility] [place]   [place] Address: [address] [place] [address], [address]  [place], [place]  [id number]   Location Phone: [phone]          Chief Complaint  · Dizziness       History Of Present Illness  Janki Krueger is a 40 year old /White female who presents for evaluation and treatment of:      Patient is here today c/o dizziness x 1 week. She states if she turns her head too fast, walks hard or hear loud noises she gets dizzy. She states she has had a few headaches, no history of migraines. She states she has no energy. She does note mild nausea. She states that its like a \"quick jarring sensation\". She has decreased appetite. She does recall that sx began with decreased dose of Effexor to 37.5 and have persisted since discontinuation of medication.       Past Medical History  Disease Name Date Onset Notes   High cholesterol --  --    Insomnia --  --    Ischemia of foot 05/01/2016 --    Neuropathic pain of foot, right 03/14/2016 --    Peripheral vascular disease 12/13/2014 --    Screening for breast cancer 12/2018 abnl left breast U/S normal    Urticaria 07/25/2016 --          Past Surgical History  Procedure Name Date Notes   *Other 2014 aortabifemoral bypass   C-sections 2005 --          Medication List  Name Date Started Instructions   Ativan 0.5 mg oral tablet 11/19/2020 take 1 tablet (0.5 mg) by oral route 3 times per day as needed for 30 days   Enteric Coated Aspirin 81 mg oral tablet,delayed release (DR/EC) 02/23/2018 take 1 tablet (81 mg) by oral route once daily for 90 days   Neurontin 300 mg oral capsule 06/19/2020 take 1 capsule (300 mg) by oral route at hs   [place] 7.5-325 mg oral tablet " 11/10/2020 one po TID PRN for severe pain (8-10)   Plavix 75 mg oral tablet  take 1 tablet (75 mg) by oral route once daily   pravastatin 80 mg oral tablet 10/15/2020 take 1 tablet (80 mg) by oral route once daily   Tylenol PM Extra Strength  mg oral tablet  one po HS         Allergy List  Allergen Name Date Reaction Notes   NO KNOWN DRUG ALLERGIES --  --  --        Allergies Reconciled  Family Medical History  Disease Name Relative/Age Notes   Lung Neoplasm, Malignant Grandmother (paternal)/   --    Diabetes, unspecified type Father/   --    Throat Cancer Grandmother (paternal)/   --    Heart Attack (MI) Father/   --    Prostate cancer Grandfather (maternal)/   --          Reproductive History  Menstrual   Age Menarche: 10   Pregnancy Summary   Total Pregnancies: 2 Full Term: 1 Premature: 0   Ab Induced: 0 Ab Spontaneous: 1 Ectopics: 0   Multiples: 0 Livin         Social History  Finding Status Start/Stop Quantity Notes   Active but no formal exercise --  --/-- --  --    Alcohol Never --/-- --  10/23/2019 -     --  --/-- --  --    No known infection risk --  --/-- --  --    Tobacco Former  pk Quit after surgery in Dec.         Immunizations  NameDate Admin Mfg Trade Name Lot Number Route Inj VIS Given VIS Publication   InfluenzaRefused 2020 NE Not Entered  NE NE     Comments:    TdapDeferred 2016 NE Not Entered  NE NE     Comments:          Review of Systems  · Constitutional  o Admits  o : fatigue  o Denies  o : fever, weight loss, weight gain  · HENT  o Admits  o : headaches, lightheadedness  · Cardiovascular  o Denies  o : lower extremity edema, claudication, chest pressure, palpitations  · Respiratory  o Denies  o : shortness of breath, wheezing, cough, hemoptysis, dyspnea on exertion  · Gastrointestinal  o Admits  o : nausea  o Denies  o : vomiting, diarrhea, constipation, abdominal pain      Vitals  Date Time BP Position Site L\R Cuff Size HR RR TEMP (F) WT  HT  BMI kg/m2  "BSA m2 O2 Sat FR L/min FiO2 HC       12/07/2020 12:53 /80 Sitting    95 - R  97.5 214lbs 8oz 5'  3\" 38 2.08 100 %  21%          Physical Examination  · Constitutional  o Appearance  o : well developed, well-nourished, no acute distress  · Head and Face  o Head  o : normocephalic, atraumatic  · Ears, Nose, Mouth and Throat  o Ears  o :   § External Ears  § : external auditory canal appearance normal, no discharge present  § Otoscopic Examination  § : tympanic membranes pearly white/gray bilaterally  o Nose  o :   § External Nose  § : no lesions noted  § Nasopharynx  § : no discharge present  o Oral Cavity  o :   § Oral Mucosa  § : oral mucosa light pink  o Throat  o :   § Oropharynx  § : tonsils without exudate, no palatal petechiae  · Neck  o Inspection/Palpation  o : normal appearance, no masses or tenderness, trachea midline  o Thyroid  o : gland size normal, nontender, no nodules or masses present on palpation  · Respiratory  o Respiratory Effort  o : breathing unlabored  o Inspection of Chest  o : chest rise symmetric bilaterally  o Auscultation of Lungs  o : clear to auscultation bilaterally throughout inspiration and expiration  · Cardiovascular  o Heart  o :   § Auscultation of Heart  § : regular rate and rhythm, no murmurs, gallops or rubs  o Peripheral Vascular System  o :   § Extremities  § : no edema  · Lymphatic  o Neck  o : no cervical lymphadenopathy, no supraclavicular lymphadenopathy  · Psychiatric  o Mood and Affect  o : mood normal, affect appropriate              Assessment  · Fatigue     780.79/R53.83  · Lightheadedness     780.4/R42  · Nausea     787.02/R11.0       Most likely due to discontinuation of Effexor; if sx change/worsen or persist; f/u in 2 weeks. Pt voiced understanding.     Problems Reconciled  Plan  · Orders  o ACO-39: Current medications updated and reviewed (1159F, ) - - 12/07/2020  · Medications  o Medications have been Reconciled  o Transition of Care or Provider " Policy  · Instructions  o Patient was educated/instructed on their diagnosis, treatment and medications prior to discharge from the clinic today.  o Call the office with any concerns or questions.  o Electronically Identified Patient Education Materials Provided Electronically  · Disposition  o Call or Return if symptoms worsen or persist.  o Follow Up 2 weeks.            Electronically Signed by: ANNIE Horne -Author on December 7, 2020 04:24:08 PM

## 2021-05-13 NOTE — PROGRESS NOTES
Progress Note      Patient Name: Janki Krueger   Patient ID: [id number]   Sex: Female   YOB: 1980    Primary Care Provider: Pablo Cristobal DO   Referring Provider: Pablo Cristobal DO    Visit Date: November 19, 2020    Provider: Pablo Cristobal DO   Location: [facility]   Location Address: [address] [place] [address], [address]  [place], [place]  [id number]   Location Phone: [phone]          Chief Complaint  · 1 month f/u   · Anxiety       History Of Present Illness  Janki Krueger is a 40 year old /White female who presents for evaluation and treatment of:      Pt is here for 1 month f/u on Anxiety.    Pt states that she would like stop taking the Effexor. Pt states that since taking the Effexor made her constipated. Patient feels like she can  come off of it. 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 Pt would like to try something that she can have on hand when she has anxiety again.    Patient goes to Vascular Surgeon on the 14th of December.    Patient states she has low energy level and wants to try something to help with her energy level.       Past Medical History  Disease Name Date Onset Notes   High cholesterol --  --    Insomnia --  --    Ischemia of foot 05/01/2016 --    Neuropathic pain of foot, right 03/14/2016 --    Peripheral vascular disease 12/13/2014 --    Screening for breast cancer 12/2018 abnl left breast U/S normal    Urticaria 07/25/2016 --          Past Surgical History  Procedure Name Date Notes   *Other 2014 aortabifemoral bypass   C-sections 2005 --          Medication List  Name Date Started Instructions   Effexor XR 37.5 mg oral capsule,extended release 24hr 10/08/2020 take 1 capsule (37.5 mg) by oral route once daily with food for 7 days   Effexor XR 75 mg oral capsule,extended release 24hr 10/08/2020 take 1 capsule (75 mg) by oral route once daily with food for 30 days   Enteric Coated Aspirin 81 mg oral  tablet,delayed release (DR/EC) 2018 take 1 tablet (81 mg) by oral route once daily for 90 days   Neurontin 300 mg oral capsule 2020 take 1 capsule (300 mg) by oral route at hs   [place] 7.5-325 mg oral tablet 11/10/2020 one po TID PRN for severe pain (8-10)   Plavix 75 mg oral tablet  take 1 tablet (75 mg) by oral route once daily   pravastatin 80 mg oral tablet 10/15/2020 take 1 tablet (80 mg) by oral route once daily   Tylenol PM Extra Strength  mg oral tablet  one po HS         Allergy List  Allergen Name Date Reaction Notes   NO KNOWN DRUG ALLERGIES --  --  --          Family Medical History  Disease Name Relative/Age Notes   Lung Neoplasm, Malignant Grandmother (paternal)/   --    Diabetes, unspecified type Father/   --    Throat Cancer Grandmother (paternal)/   --    Heart Attack (MI) Father/   --    Prostate cancer Grandfather (maternal)/   --          Reproductive History  Menstrual   Age Menarche: 10   Pregnancy Summary   Total Pregnancies: 2 Full Term: 1 Premature: 0   Ab Induced: 0 Ab Spontaneous: 1 Ectopics: 0   Multiples: 0 Livin         Social History  Finding Status Start/Stop Quantity Notes   Active but no formal exercise --  --/-- --  --    Alcohol Never --/-- --  10/23/2019 -     --  --/-- --  --    No known infection risk --  --/-- --  --    Tobacco Former  pk Quit after surgery in Dec.         Immunizations  NameDate Admin Mfg Trade Name Lot Number Route Inj VIS Given VIS Publication   TdapDeferred 2016 NE Not Entered  NE NE     Comments:          Review of Systems  · Constitutional  o Admits  o : fatigue  o Denies  o : night sweats  · Eyes  o Denies  o : double vision, blurred vision  · HENT  o Denies  o : vertigo, recent head injury  · Cardiovascular  o Denies  o : chest pain, irregular heart beats  · Respiratory  o Denies  o : shortness of breath, productive cough  · Gastrointestinal  o Denies  o : nausea, vomiting  · Genitourinary  o Denies  o :  "dysuria, urinary retention  · Integument  o Denies  o : hair growth change, new skin lesions  · Neurologic  o Denies  o : altered mental status, seizures  · Musculoskeletal  o Admits  o : claudication from PVD  o Denies  o : joint swelling, limitation of motion  · Endocrine  o Denies  o : cold intolerance, heat intolerance  · Psychiatric  o Admits  o : anxiety  o Denies  o : depression, suicidal ideation, homicidal ideation  · Heme-Lymph  o Denies  o : petechiae, lymph node enlargement or tenderness  · Allergic-Immunologic  o Denies  o : frequent illnesses      Vitals  Date Time BP Position Site L\R Cuff Size HR RR TEMP (F) WT  HT  BMI kg/m2 BSA m2 O2 Sat FR L/min FiO2 HC       10/08/2020 08:58 /68 Sitting    97 - R  97.9 211lbs 8oz 5'  3\" 37.47 2.07       11/19/2020 09:10 /55 Sitting    58 - R  97.3 211lbs 0oz 5'  3\" 37.38 2.06 96 %  21%          Physical Examination  · Constitutional  o Appearance  o : alert, oriented, in no acute distress, well developed, well-nourished, obese  · Eyes  o Vision  o : Conjuntivae: Normal, Sclerae white, Pupils: PERRL, Cornea: Clear, no lesions bilateral  · Ears, Nose, Mouth and Throat  o Ears  o : Ext. Ears: Normal shape, Non tender, EACs: Normal , Tragus intact bilaterally, Hearing: intact to conversational voice bilaterally  o Nose  o : No nasal discharge, Mucosa: normal, Septum: midline, Sinuses: Nontender  o Throat  o : Oropharynx: no inflmation or lesions, no purulence or drainage  · Neck  o Inspection/Palpation  o : Supple, no masses or tenderness, no deformities, Trachea: Midline, ROM: with in normal limits, no neck stiffness, no lymphadenopathy  o Thyroid  o : no thyomegaly, no palpabale masses   · Respiratory  o Auscultation of Lungs  o : normal breath sounds throughout, no wheeze, rhonchi, or crackles  · Cardiovascular  o Heart  o : Regular rate and rhythm, Normal S1,S2 , No cardiac murmers, No S3 or S4 gallop or rubs  · Gastrointestinal  o Abdominal " Examination  o : abdomen soft, nontender, non distended, no rigidity, gaurding, rebound tenderness, no ventral hernias present  o Liver and spleen  o : no hepatomegaly present, liver nontender to palpation, spleen not palpable  · Skin and Subcutaneous Tissue  o General Inspection  o : no rashes on visible skin, normal skin color, warm and dry  o Digits and Nails  o : no clubbing, cyanosis, deformities or edema present, normal appearing nails  · Neurologic  o Mental Status Examination  o : alert and oriented to time, place, and person. Gait and Station: normal gait, able to stand without difficulty. CN 2-12 grossly intact   · Psychiatric  o Judgment and Insight  o : judgment and insight intact  o Mood and Affect  o : normal mood and affect          Assessment  · Anxiety disorder     300.00/F41.9  Will wean off Effexor by decreasing dose over next two weeks before stopping. Will provide PRN Ativan on off chance she develops significant anxiety in situations as she feels this is more her issue than daily constant anxiety. JARED and UDS are up to date.  · Neuropathic pain of foot, right     355.8/G57.91  · Urticaria     708.9/L50.9  · Insomnia     780.52/G47.00  · PVD (peripheral vascular disease)     443.9/I73.9  We will see what evaluation holds with Vascular Surgeon may need additional stenting. Patient currently on Plavix.   · Limb pain     729.5/M79.609  · Morbid obesity     278.01/E66.01    Problems Reconciled  Plan  · Orders  o ACO - Pt declines to or was not able to provide an Advance Care Plan or name a Surrogate Decision Maker (1124F) - - 11/19/2020  o ACO-39: Current medications updated and reviewed (1159F, ) - - 11/19/2020  o ACO-14: Influenza immunization was not administered for reasons documented [facility] () - - 11/19/2020  · Medications  o Ativan 0.5 mg oral tablet   SIG: take 1 tablet (0.5 mg) by oral route 3 times per day as needed for 30 days   DISP: (90) Tablet with 2 refills  Prescribed on  "11/19/2020     o Effexor XR 37.5 mg oral capsule,extended release 24hr   SIG: take 1 capsule (37.5 mg) by oral route once daily with food for 14 days   DISP: (14) Capsule with 0 refills  Adjusted on 11/19/2020     o Effexor XR 75 mg oral capsule,extended release 24hr   SIG: take 1 capsule (75 mg) by oral route once daily with food for 30 days   DISP: (30) Capsule with 5 refills  Discontinued on 11/19/2020     o Medications have been Reconciled  o Transition of Care or Provider Policy  · Instructions  o Patient agrees to a \"No Self Harm\" contract. Patient will either call , [facility], , [facility], [facility].  o Take all medications as prescribed/directed.  o Patient was educated/instructed on their diagnosis, treatment and medications prior to discharge from the clinic today.  o Patient instructed to seek medical attention urgently for new or worsening symptoms.  o Call the office with any concerns or questions.  o Bring all medicines with their bottles to each office visit.  o Minutes spent with patient including greater than 50% in Education/Counseling/Care Coordination.  o Time spent with the patient was minutes, more than 50% face to face.  o Chronic conditions reviewed and taken into consideration for today's treatment plan.  o Flu vaccine declined.  o Pneumonia vaccine declined.   o Discussed Covid-19 precautions including, but not limited to, social distancing, avoid touching your face, and hand washing.   o Electronically Identified Patient Education Materials Provided Electronically  · Disposition  o Follow up in three months            Electronically Signed by: Pablo Cristobal, DO -Author on November 19, 2020 09:53:22 AM  "

## 2021-05-13 NOTE — PROGRESS NOTES
Progress Note      Patient Name: Janki Krueger   Patient ID: [id number]   Sex: Female   YOB: 1980    Primary Care Provider: Pablo Cristobal DO   Referring Provider: Pablo Cristobal DO    Visit Date: September 8, 2020    Provider: Pablo Cristobal DO   Location: [facility]   Location Address: [address] [place] [address], [address]  [place], [place]  [id number]   Location Phone: [phone]          Chief Complaint  · Annual Wellness Exam      History Of Present Illness  The patient is a 40 year old /White female who has come to this office for her Annual Wellness Visit.   Her Primary Care Provider is Pablo Cristobal DO. Her comprehensive Care Team list, including suppliers, has been updated on the Facesheet. Her medical/family history, height, weight, BMI, and blood pressure have been reviewed and are in the chart. The Health Risk Assessment has been completed and scanned in the chart.   Medications are listed in the medication list.   The active problem list includes: Insomnia, Ischemia of foot, Neuropathic pain of foot, right, Peripheral vascular disease, and Urticaria   The patient does not have a history of substance use.   Patient reports her diet is adequate.   The Mini-Cog has been administered and is scanned in chart. The results are negative. Her cognitive function is without limitation.   A hearing loss screen was completed today and the result is negative.   Patient does not have any risk factors for depression. Patient completed the PHQ-9 today and it has been scanned in the chart. The total score is 5-9.   The Timed Up and Go screen was administered today and the result is negative.   The [place] Index of [place] in ADLs indicated full function (score of 6).   A Falls Risk Assessment has been completed, including a review of home fall hazards and medication review.   Overall, the patient's functional ability is noted by this provider to be within normal  "limits. Her level of safety is noted to be within normal limits. Her balance/gait is within normal limits. There have been no falls in the past year. Patient-specific home safety recommendations have been reviewed and a copy has been given to patient.   She denies issues with leaking urine.   There are no additional risk factors identified.   Living Will/Advanced Directive has not previously been completed.   Personalized health advice was given to the patient and a written health screening schedule was established; see Plan for details.   Janki Krueger is a 40 year old /White female who presents for evaluation and treatment of:       Vitals  Date Time BP Position Site L\R Cuff Size HR RR TEMP (F) WT  HT  BMI kg/m2 BSA m2 O2 Sat HC       05/06/2020 12:44 /90 Sitting    88 - R   212lbs 0oz 5'  4\" 36.39 2.08     09/08/2020 09:47 /67 Sitting    91 - R   211lbs 6oz 5'  3\" 37.44 2.06 99 %              Assessment  · Screening for alcoholism     V79.1/Z13.89  · Screening for depression     V79.0/Z13.89  · Visit for screening mammogram     V76.12/Z12.31  · Encounter for Medicare annual wellness exam     V70.0/Z00.00      Plan  · Orders  o Negative alcohol screening () - V79.1/Z13.89 - 09/08/2020  o Screening Mammography; Bilateral 3D (45740, , 85268) - V76.12/Z12.31 - 09/08/2020  o Falls Risk Assessment Completed (3288F) - V70.0/Z00.00 - 09/08/2020  o Brief hearing screening (written) [facility] () - V70.0/Z00.00 - 09/08/2020  o Presence or absence of urinary incontinence assessed (RUBÉN) (1090F) - V70.0/Z00.00 - 09/08/2020  o ACO - Pt declines to or was not able to provide an Advance Care Plan or name a Surrogate Decision Maker (1124F) - - 09/08/2020  o ACO-39: Current medications updated and reviewed () - - 09/08/2020  · Instructions  o Audit-C Questionnaire completed and scanned into the EMR under the designated folder within the patient's documents.  o Depression Screen completed and " scanned into the EMR under the designated folder within the patient's documents.  o Today's PHQ-9 result is _5__  o Health Risk Assessment has been reviewed with the patient.  o Written health screening schedule for next 5-10 years was established with patient; information scanned in chart and given/mailed to patient.  o Fall prevention methods discussed and a copy of recommendations given/mailed to patient.  o Take all medications as prescribed/directed.  o Patient was educated/instructed on their diagnosis, treatment and medications prior to discharge from the clinic today.  o Bring all medicines with their bottles to each office visit.            Electronically Signed by: Pablo Cristobal, DO -Author on September 8, 2020 10:10:00 AM

## 2021-05-13 NOTE — PROGRESS NOTES
Progress Note      Patient Name: Janki Krueger   Patient ID: [id number]   Sex: Female   YOB: 1980    Primary Care Provider: Pablo Cristobal DO   Referring Provider: Pablo Cristobal DO    Visit Date: September 8, 2020    Provider: Pablo Cristobal DO   Location: [facility]   Location Address: [address] [place] [address], [address]  [place], [place]  [id number]   Location Phone: [phone]          Chief Complaint  · Follow up      History Of Present Illness  Janki Krueger is a 40 year old /White female who presents for evaluation and treatment of:      Pt is here for a f/u.    Pt states that she wants to go over her visit with Vascular. Pt states that they want to hold off on surgery for 6 months from June. Pt states that she currently taking Plavix 75mg QD. Pt states that she has  been a lot pain in her legs. Patient does not want to do surgery currently but know she needs to do so.    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 Patient just feels tired all the time and worn out. She just doesn't feel well.    Pt states that she needs refill on her Norco 7.5-325mg BID, PRN for pain.       Past Medical History  Disease Name Date Onset Notes   High cholesterol --  --    Insomnia --  --    Ischemia of foot 05/01/2016 --    Neuropathic pain of foot, right 03/14/2016 --    Peripheral vascular disease 12/13/2014 --    Screening for breast cancer 12/2018 abnl left breast U/S normal    Urticaria 07/25/2016 --          Past Surgical History  Procedure Name Date Notes   *Other 2014 aortabifemoral bypass   C-sections 2005 --          Medication List  Name Date Started Instructions   Enteric Coated Aspirin 81 mg oral tablet,delayed release (/EC) 02/23/2018 take 1 tablet (81 mg) by oral route once daily for 90 days   Neurontin 300 mg oral capsule 06/19/2020 take 1 capsule (300 mg) by oral route at hs   [place] 7.5-325 mg  oral tablet 2020 one po TID PRN for severe pain (8-10)   Plavix 75 mg oral tablet  take 1 tablet (75 mg) by oral route once daily   pravastatin 80 mg oral tablet 2020 take 1 tablet (80 mg) by oral route once daily   Tylenol PM Extra Strength  mg oral tablet  one po HS         Allergy List  Allergen Name Date Reaction Notes   NO KNOWN DRUG ALLERGIES --  --  --          Family Medical History  Disease Name Relative/Age Notes   Lung Neoplasm, Malignant Grandmother (paternal)/   --    Diabetes, unspecified type Father/   --    Throat Cancer Grandmother (paternal)/   --    Heart Attack (MI) Father/   --    Prostate cancer Grandfather (maternal)/   --          Reproductive History  Menstrual   Age Menarche: 10   Pregnancy Summary   Total Pregnancies: 2 Full Term: 1 Premature: 0   Ab Induced: 0 Ab Spontaneous: 1 Ectopics: 0   Multiples: 0 Livin         Social History  Finding Status Start/Stop Quantity Notes   Active but no formal exercise --  --/-- --  --    Alcohol Never --/-- --  10/23/2019 -     --  --/-- --  --    No known infection risk --  --/-- --  --    Tobacco Former  pk Quit after surgery in Dec.         Immunizations  NameDate Admin Mfg Trade Name Lot Number Route Inj VIS Given VIS Publication   TdapDeferred 2016 NE Not Entered  NE NE     Comments:          Review of Systems  · Constitutional  o Denies  o : fatigue, night sweats  · Eyes  o Denies  o : double vision, blurred vision  · HENT  o Denies  o : vertigo, recent head injury  · Cardiovascular  o Denies  o : chest pain, irregular heart beats  · Respiratory  o Denies  o : shortness of breath, productive cough  · Gastrointestinal  o Denies  o : nausea, vomiting  · Genitourinary  o Denies  o : dysuria, urinary retention  · Integument  o Denies  o : hair growth change, new skin lesions  · Neurologic  o Denies  o : altered mental status, seizures  · Musculoskeletal  o Admits  o : joint pain, muscle pain, muscle  cramps  o Denies  o : joint swelling, limitation of motion  · Endocrine  o Denies  o : cold intolerance, heat intolerance  · Psychiatric  o Admits  o : anxiety, depression  · Heme-Lymph  o Denies  o : petechiae, lymph node enlargement or tenderness  · Allergic-Immunologic  o Denies  o : frequent illnesses      Physical Examination  · Constitutional  o Appearance  o : alert, oriented, in no acute distress, well developed, well-nourished  · Eyes  o Vision  o : Conjuntivae: Normal, Sclerae white, Pupils: PERRL, Cornea: Clear, no lesions bilateral  · Ears, Nose, Mouth and Throat  o Ears  o : Ext. Ears: Normal shape, Non tender, EACs: Normal , Tragus intact bilaterally, Hearing: intact to conversational voice bilaterally  o Nose  o : No nasal discharge, Mucosa: normal, Septum: midline, Sinuses: Nontender  o Throat  o : Oropharynx: no inflmation or lesions, no purulence or drainage  · Neck  o Inspection/Palpation  o : Supple, no masses or tenderness, no deformities, Trachea: Midline, ROM: with in normal limits, no neck stiffness, no lymphadenopathy  o Thyroid  o : no thyomegaly, no palpabale masses   · Respiratory  o Auscultation of Lungs  o : normal breath sounds throughout, no wheeze, rhonchi, or crackles  · Cardiovascular  o Heart  o : Regular rate and rhythm, Normal S1,S2 , No cardiac murmers, No S3 or S4 gallop or rubs  · Gastrointestinal  o Abdominal Examination  o : abdomen soft, nontender, non distended, no rigidity, gaurding, rebound tenderness, no ventral hernias present  o Liver and spleen  o : no hepatomegaly present, liver nontender to palpation, spleen not palpable  · Skin and Subcutaneous Tissue  o General Inspection  o : no rashes on visible skin, normal skin color, warm and dry  o Digits and Nails  o : no clubbing, cyanosis, deformities or edema present, normal appearing nails  · Neurologic  o Mental Status Examination  o : alert and oriented to time, place, and person. Gait and Station: normal gait, able  to stand without difficulty. CN 2-12 grossly intact   · Psychiatric  o Judgment and Insight  o : judgment and insight intact  o Mood and Affect  o : normal mood and affect          Results  · In-Office Procedures  o Lab procedure  § IOP - Urine Drug Screen In-House [facility] ([id number])   § Amphetamines Ur Ql: Negative   § Barbiturates Ur Ql: Negative   § Buprenorphine+Nor Ur Ql Scn: Negative   § Benzodiaz Ur Ql: Negative   § Cocaine Ur Ql: Negative   § Methadone Ur Ql: Negative   § Methamphet Ur Ql: Negative   § MDMA Ur Ql Scn: Negative   § Opiates Ur Ql: Negative   § Oxycodone Ur Ql: Negative   § PCP Ur Ql: Negative   § THC Ur Ql: Negative   § Temp in Range?: Within/Acceptable   § Control Seen?: Yes       Assessment  · Hyperlipidemia     272.4/E78.5  · Ischemia of foot     459.9/I99.8  · Neuropathic pain of foot, right     355.8/G57.91  Patient JARED and UDS to be completed today. Refilling Norco and Gabapentin today.  · Peripheral vascular disease     443.9/I73.9  · Insomnia     780.52/G47.00  · Anxiety and depression       Anxiety disorder, unspecified     300.00/F41.9  Major depressive disorder, single episode, unspecified     300.00/F32.9  Will start patient on low dose of Lexapro given her persistent symptoms of decreased mood, decreased energy, decreased interest. Patient understands risk and benefits and wishes to proceed. Patient to stop if makes symptoms worse.      Plan  · Orders  o ACO - Pt declines to or was not able to provide an Advance Care Plan or name a Surrogate Decision Maker (1124F) - - 09/08/2020  o CMP [facility] ([id number]) - 272.4/E78.5, 443.9/I73.9 - 09/08/2020  o Lipid Panel [facility] ([id number]) - 443.9/I73.9, 272.4/E78.5 - 09/08/2020  o ACO-39: Current medications updated and reviewed () - - 09/08/2020  o ACO-14: Influenza immunization was not administered for reasons documented () - - 09/08/2020  · Medications  o Lexapro 10 mg oral tablet   SIG: take 1 tablet (10 mg) by oral route once daily for 30 days    DISP: (30) tablets with 5 refills  Prescribed on 09/08/2020     o Norco 7.5-325 mg oral tablet   SIG: one po TID PRN for severe pain (8-10)   DISP: (90) tablets with 0 refills  Adjusted on 09/08/2020     o albuterol sulfate 2.5 mg /3 mL (0.083 %) inhalation solution for nebulization   SIG: inhale 3 milliliters (2.5 mg) by nebulization route every 6 hours   DISP: (1) Milliliter with 0 refills  Discontinued on 09/08/2020     o Diflucan 150 mg oral tablet   SIG: take 1 tablet (150 mg) by oral route once   DISP: (1) tablet with 1 refills  Discontinued on 09/08/2020     o Levaquin 750 mg oral tablet   SIG: take 1 tablet (750 mg) by oral route once daily for 7 days   DISP: (7) tablets with 0 refills  Discontinued on 09/08/2020     o Pravachol 40 mg oral tablet   SIG: take 1 tablet (40 mg) by oral route once daily at bedtime for 90 days   DISP: (90) Tablet with 3 refills  Discontinued on 09/08/2020     o prednisone 10 mg oral tablet   SIG: take 1 tablet (10 mg) by oral route once daily   DISP: (5) tablets with 0 refills  Discontinued on 09/08/2020     o Tessalon Per[place] 100 mg oral capsule   SIG: take 1 capsule (100 mg) by oral route 3 times per day as needed for cough   DISP: (30) capsules with 0 refills  Discontinued on 09/08/2020     o Medications have been Reconciled  o Transition of Care or Provider Policy  · Instructions  o Recommended exercise program to assist with cholesterol, weight loss and overall health improvement.  o Advised that cheeses and other sources of dairy fats, animal fats, fast food, and the extras (candy, pastries, pies, doughnuts and cookies) all contain LDL raising nutrients. Advised to increase fruits, vegetables, whole grains, and to monitor portion sizes.   o Take all medications as prescribed/directed.  o Patient was educated/instructed on their diagnosis, treatment and medications prior to discharge from the clinic today.  o Patient instructed to seek medical attention urgently for new or  worsening symptoms.  o Call the office with any concerns or questions.  o Bring all medicines with their bottles to each office visit.  o Minutes spent with patient including greater than 50% in Education/Counseling/Care Coordination.  o Time spent with the patient was minutes, more than 50% face to face.  o Chronic conditions reviewed and taken into consideration for today's treatment plan.  o Discussed Covid-19 precautions including, but not limited to, social distancing, avoid touching your face, and hand washing.   o Electronically Identified Patient Education Materials Provided Electronically  · Disposition  o Follow up in one month.            Electronically Signed by: Pablo Cristobal, DO -Author on September 11, 2020 08:57:21 AM

## 2021-05-14 VITALS
HEIGHT: 63 IN | DIASTOLIC BLOOD PRESSURE: 80 MMHG | WEIGHT: 214.5 LBS | BODY MASS INDEX: 38.01 KG/M2 | TEMPERATURE: 97.5 F | OXYGEN SATURATION: 100 % | SYSTOLIC BLOOD PRESSURE: 134 MMHG | HEART RATE: 95 BPM

## 2021-05-14 VITALS
BODY MASS INDEX: 37.39 KG/M2 | HEIGHT: 63 IN | HEART RATE: 58 BPM | TEMPERATURE: 97.3 F | OXYGEN SATURATION: 96 % | DIASTOLIC BLOOD PRESSURE: 55 MMHG | SYSTOLIC BLOOD PRESSURE: 115 MMHG | WEIGHT: 211 LBS

## 2021-05-14 VITALS
WEIGHT: 211.37 LBS | OXYGEN SATURATION: 99 % | HEIGHT: 63 IN | SYSTOLIC BLOOD PRESSURE: 134 MMHG | BODY MASS INDEX: 37.45 KG/M2 | HEART RATE: 91 BPM | DIASTOLIC BLOOD PRESSURE: 67 MMHG

## 2021-05-14 VITALS
OXYGEN SATURATION: 97 % | HEART RATE: 88 BPM | DIASTOLIC BLOOD PRESSURE: 63 MMHG | WEIGHT: 215.37 LBS | SYSTOLIC BLOOD PRESSURE: 109 MMHG

## 2021-05-14 VITALS
HEIGHT: 62 IN | BODY MASS INDEX: 39.61 KG/M2 | WEIGHT: 215.25 LBS | DIASTOLIC BLOOD PRESSURE: 74 MMHG | SYSTOLIC BLOOD PRESSURE: 160 MMHG

## 2021-05-14 VITALS
HEIGHT: 63 IN | OXYGEN SATURATION: 100 % | SYSTOLIC BLOOD PRESSURE: 128 MMHG | DIASTOLIC BLOOD PRESSURE: 76 MMHG | WEIGHT: 217.12 LBS | BODY MASS INDEX: 38.47 KG/M2 | HEART RATE: 96 BPM

## 2021-05-14 VITALS
WEIGHT: 211.5 LBS | BODY MASS INDEX: 37.47 KG/M2 | HEART RATE: 97 BPM | SYSTOLIC BLOOD PRESSURE: 128 MMHG | HEIGHT: 63 IN | DIASTOLIC BLOOD PRESSURE: 68 MMHG | TEMPERATURE: 97.9 F

## 2021-05-14 LAB — ALDOST SERPL-MCNC: 1.8 NG/DL (ref 0–30)

## 2021-05-14 NOTE — PROGRESS NOTES
Progress Note      Patient Name: Janki Krueger   Patient ID: [id number]   Sex: Female   YOB: 1980    Primary Care Provider: Caitlin FINNEY   Referring Provider: Caitlin FINNEY    Visit Date: January 13, 2021    Provider: Pablo Cristobal DO   Location: [facility] [place]   Location Address: [address] [place] [address], [address]  [place], [place]  [id number]   Location Phone: [phone]          History Of Present Illness  Janki Krueger is a 40 year old /White female who presents for evaluation and treatment of:      Pt is here for follow up on vascular.    Pt states that he saw Dr. Castañeda on 1/11/2021, to f/u on what Dr. Mauricio wanted to do surgery on her graph revision on left leg. Pt states that she is completely confused on what to do now since Dr. Castañeda doesn't want to do surgery. I reviewed his note which says there is patency of native vessel and graft is totally occluded. It looks like they were going to repeat ultrasound in six months and continue with Plavix.           Past Medical History  Disease Name Date Onset Notes   High cholesterol --  --    Insomnia --  --    Ischemia of foot 05/01/2016 --    Neuropathic pain of foot, right 03/14/2016 --    Peripheral vascular disease 12/13/2014 --    Screening for breast cancer 12/2018 abnl left breast U/S normal    Urticaria 07/25/2016 --          Past Surgical History  Procedure Name Date Notes   *Other 2014 aortabifemoral bypass   C-sections 2005 --          Medication List  Name Date Started Instructions   Ativan 0.5 mg oral tablet 11/19/2020 take 1 tablet (0.5 mg) by oral route 3 times per day as needed for 30 days   Enteric Coated Aspirin 81 mg oral tablet,delayed release (/EC) 02/23/2018 take 1 tablet (81 mg) by oral route once daily for 90 days   Neurontin 300 mg oral capsule 01/13/2021 take 1 capsule (300 mg) by oral route at hs   [place] 7.5-325 mg oral tablet 12/15/2020 one po TID PRN for severe pain (8-10)    Plavix 75 mg oral tablet  take 1 tablet (75 mg) by oral route once daily   pravastatin 80 mg oral tablet 10/15/2020 take 1 tablet (80 mg) by oral route once daily   Tylenol PM Extra Strength  mg oral tablet  one po HS         Allergy List  Allergen Name Date Reaction Notes   NO KNOWN DRUG ALLERGIES --  --  --          Family Medical History  Disease Name Relative/Age Notes   Lung Neoplasm, Malignant Grandmother (paternal)/   --    Diabetes, unspecified type Father/   --    Throat Cancer Grandmother (paternal)/   --    Heart Attack (MI) Father/   --    Prostate cancer Grandfather (maternal)/   --          Reproductive History  Menstrual   Age Menarche: 10   Pregnancy Summary   Total Pregnancies: 2 Full Term: 1 Premature: 0   Ab Induced: 0 Ab Spontaneous: 1 Ectopics: 0   Multiples: 0 Livin         Social History  Finding Status Start/Stop Quantity Notes   Active but no formal exercise --  --/-- --  --    Alcohol Never --/-- --  10/23/2019 -     --  --/-- --  --    No known infection risk --  --/-- --  --    Tobacco Former / pk Quit after surgery in Dec.         Immunizations  NameDate Admin Mfg Trade Name Lot Number Route Inj VIS Given VIS Publication   InfluenzaRefused 2020 NE Not Entered  NE NE     Comments:    TdapDeferred 2016 NE Not Entered  NE NE     Comments:          Review of Systems  · Constitutional  o Denies  o : fatigue, night sweats  · Eyes  o Denies  o : double vision, blurred vision  · HENT  o Denies  o : vertigo, recent head injury  · Cardiovascular  o Admits  o : symptoms consistent with claudication, primarily in the left LE  o Denies  o : chest pain, irregular heart beats  · Respiratory  o Denies  o : shortness of breath, productive cough  · Gastrointestinal  o Denies  o : nausea, vomiting  · Genitourinary  o Denies  o : dysuria, urinary retention  · Integument  o Denies  o : hair growth change, new skin lesions  · Neurologic  o Denies  o : altered mental  "status, seizures  · Musculoskeletal  o Denies  o : joint swelling, limitation of motion  · Endocrine  o Denies  o : cold intolerance, heat intolerance  · Psychiatric  o Denies  o : anxiety, depression, delusions  · Heme-Lymph  o Denies  o : petechiae, lymph node enlargement or tenderness  · Allergic-Immunologic  o Denies  o : frequent illnesses      Vitals  Date Time BP Position Site L\R Cuff Size HR RR TEMP (F) WT  HT  BMI kg/m2 BSA m2 O2 Sat FR L/min FiO2 HC       12/07/2020 12:53 /80 Sitting    95 - R  97.5 214lbs 8oz 5'  3\" 38 2.08 100 %  21%    01/13/2021 11:05 /63 Sitting    88 - R   215lbs 6oz    97 %  21%          Physical Examination  · Constitutional  o Appearance  o : alert, oriented, in no acute distress, well developed, well-nourished  · Eyes  o Vision  o : Conjuntivae: Normal, Sclerae white, Pupils: PERRL, Cornea: Clear, no lesions bilateral  · Ears, Nose, Mouth and Throat  o Ears  o : Ext. Ears: Normal shape, Non tender, EACs: Normal , Tragus intact bilaterally, Hearing: intact to conversational voice bilaterally  o Nose  o : No nasal discharge, Mucosa: normal, Septum: midline, Sinuses: Nontender  o Throat  o : Oropharynx: no inflmation or lesions, no purulence or drainage  · Neck  o Inspection/Palpation  o : Supple, no masses or tenderness, no deformities, Trachea: Midline, ROM: with in normal limits, no neck stiffness, no lymphadenopathy  o Thyroid  o : no thyomegaly, no palpabale masses   · Respiratory  o Auscultation of Lungs  o : normal breath sounds throughout, no wheeze, rhonchi, or crackles  · Cardiovascular  o Heart  o : Regular rate and rhythm, Normal S1,S2 , No cardiac murmers, No S3 or S4 gallop or rubs, patient normal posterior tibialis and dorsalis pedis pulses right, left posterior tibialis is faint and cannot palpate dorsalis pedis pulse on left, left foot and lower leg cooler when compared to right  · Gastrointestinal  o Abdominal Examination  o : abdomen soft, nontender, " non distended, no rigidity, gaurding, rebound tenderness, no ventral hernias present  o Liver and spleen  o : no hepatomegaly present, liver nontender to palpation, spleen not palpable  · Skin and Subcutaneous Tissue  o General Inspection  o : no rashes on visible skin, normal skin color, warm and dry  o Digits and Nails  o : no clubbing, cyanosis, deformities or edema present, normal appearing nails  · Neurologic  o Mental Status Examination  o : alert and oriented to time, place, and person. Gait and Station: normal gait, able to stand without difficulty. CN 2-12 grossly intact   · Psychiatric  o Judgment and Insight  o : judgment and insight intact  o Mood and Affect  o : normal mood and affect          Assessment  · Hyperlipidemia     272.4/E78.5  · Insomnia     780.52/G47.00  · Atherosclerotic PVD with intermittent claudication     440.21/I70.219  Given patient significant discomfort and abnormal exam will send to [facility] in [place] for second opinion given conflicting opinions from two seen in [facility]. Patient continue with Plavix and high intensity statin currently.   · Neuropathic pain of both feet     356.9/G57.93  Patient pain related to claudication most likely. Will continue at this time PRN Norco and Gabapentin. Patient JARED and UDS up to date. Instructed to limit use as much as possible. Follow up after evaluation by [facility] [place] [facility].    Problems Reconciled  Plan  · Orders  o ACO - Pt declines to or was not able to provide an Advance Care Plan or name a Surrogate Decision Maker (1124F) - - 01/13/2021  o ACO-39: Current medications updated and reviewed (1159F, ) - - 01/13/2021  · Medications  o Norco 7.5-325 mg oral tablet   SIG: one po TID PRN for severe pain (8-10)   DISP: (90) Tablet with 0 refills  Refilled on 01/13/2021     o Medications have been Reconciled  o Transition of Care or Provider Policy  · Instructions  o Recommended exercise program to assist with cholesterol, weight  loss and overall health improvement.  o Advised that cheeses and other sources of dairy fats, animal fats, fast food, and the extras (candy, pastries, pies, doughnuts and cookies) all contain LDL raising nutrients. Advised to increase fruits, vegetables, whole grains, and to monitor portion sizes.   o Patient was educated/instructed on their diagnosis, treatment and medications prior to discharge from the clinic today.  o Patient instructed to seek medical attention urgently for new or worsening symptoms.  o Call the office with any concerns or questions.  o Bring all medicines with their bottles to each office visit.  o Minutes spent with patient including greater than 50% in Education/Counseling/Care Coordination.  o Time spent with the patient was minutes, more than 50% face to face.  o Chronic conditions reviewed and taken into consideration for today's treatment plan.  o Discussed Covid-19 precautions including, but not limited to, social distancing, avoid touching your face, and hand washing.   o Electronically Identified Patient Education Materials Provided Electronically  · Disposition  o Follow up in three months            Electronically Signed by: Pablo Cristobal, DO -Author on January 13, 2021 02:04:47 PM

## 2021-05-14 NOTE — PROGRESS NOTES
Progress Note      Patient Name: Janki Krueger   Patient ID: [id number]   Sex: Female   YOB: 1980    Primary Care Provider: Caitlin FINNEY   Referring Provider: Caitlin IFNNEY    Visit Date: February 19, 2021    Provider: REG Isaac   Location: [facility] - [place], [place]   Location Address: [address] [place] [address], [address]  [place], [place]  [id number]   Location Phone: [phone]          Chief Complaint  · F/U      History Of Present Illness  Janki Krueger is a 40 year old /White female who presents for evaluation and treatment of:      Patient is here today for a follow up and to establish care with Caitlin.    States she have been having urgency, burning and strong smell in her urine since yesterday. Says she never had UTI before. Would like Diflucan with it reports yeast infections with antibiotics. U/A performed in the office-neg for nitrates and leukocytes, positive for trace-intact blood. Ordered urine culture and U/A w/micro. Due to symptoms prescribed Macrobid 100mg bid x 7days.     bifem bypass 2014-she had revision in 2016, 2017-left leg is 50% blocked again. She was seeing vascular here until lately she was unhappy with her care so she went ot see  in [facility]-he did not want to do surgery until she is at 70% blockage. She has a f/u in 3m. She is on Plavix, asa and he discussed adding xeralto. He mentioned pletel but it gave her migraines. She is taking Neurontin 300mg q hs for her neuropathy sympotms and Norco for pain. States at times she breaks the Norco in half because it makes her sleepy. Request rf of [facility].    she is on pravastatin as preventative with her blockage.     anxiety-she takes Ativan as needed states maybe once wk.    hx of kidney stones 2012-states her symptoms do not feel like a kidney stones.    she does not have a menses-had ablation 2018.    Flu shot refused  TDAP refused  UDS 09/08/20  Erik 01/13/21    Mammogram 09/2020 normal  cysts as previous  Pap smear 2018 will schedule herself with Dr. Muse.       Past Medical History  Disease Name Date Onset Notes   High cholesterol --  --    Insomnia --  --    Ischemia of foot 2016 --    Neuropathic pain of foot, right 2016 --    Pap smear for cervical cancer screening  Dr. Muse normal   Peripheral vascular disease 2014 --    Screening for breast cancer 2020 cysts stable normal   Urticaria 2016 --          Past Surgical History  Procedure Name Date Notes   *Other  aortabifemoral bypass   C-sections  --          Medication List  Name Date Started Instructions   Ativan 0.5 mg oral tablet 2020 take 1 tablet (0.5 mg) by oral route 3 times per day as needed for 30 days   Enteric Coated Aspirin 81 mg oral tablet,delayed release (/EC) 2018 take 1 tablet (81 mg) by oral route once daily for 90 days   Neurontin 300 mg oral capsule 2021 take 1 capsule (300 mg) by oral route at hs   [place] 7.5-325 mg oral tablet 2021 one po TID PRN for severe pain (8-10)   Plavix 75 mg oral tablet  take 1 tablet (75 mg) by oral route once daily   pravastatin 80 mg oral tablet 10/15/2020 take 1 tablet (80 mg) by oral route once daily   Tylenol PM Extra Strength  mg oral tablet  one po HS         Allergy List  Allergen Name Date Reaction Notes   NO KNOWN DRUG ALLERGIES --  --  --        Allergies Reconciled  Family Medical History  Disease Name Relative/Age Notes   Lung Neoplasm, Malignant Grandmother (paternal)/   --    Diabetes, unspecified type Father/   --    Throat Cancer Grandmother (paternal)/   --    Heart Attack (MI) Father/   --    Prostate cancer Grandfather (maternal)/   --          Reproductive History  Menstrual   Age Menarche: 10   Pregnancy Summary   Total Pregnancies: 2 Full Term: 1 Premature: 0   Ab Induced: 0 Ab Spontaneous: 1 Ectopics: 0   Multiples: 0 Livin         Social History  Finding Status Start/Stop Quantity Notes   Active but no  "formal exercise --  --/-- --  --    Alcohol Never --/-- --  02/19/2021 - 10/23/2019 -     --  --/-- --  --    No known infection risk --  --/-- --  --    Tobacco Former 17/39 1/2 pk Quit after surgery in Dec.         Immunizations  NameDate Admin Mfg Trade Name Lot Number Route Inj VIS Given VIS Publication   InfluenzaRefused 12/07/2020 NE Not Entered  NE NE     Comments:    TdapDeferred 03/14/2016 NE Not Entered  NE NE     Comments:          Review of Systems  · Constitutional  o Denies  o : fever, fatigue, weight loss, weight gain  · Cardiovascular  o Denies  o : lower extremity edema, claudication, chest pressure, palpitations  · Respiratory  o Denies  o : shortness of breath, wheezing, cough, hemoptysis, dyspnea on exertion  · Gastrointestinal  o Denies  o : nausea, vomiting, diarrhea, constipation, abdominal pain  · Genitourinary  o Admits  o : urgency, frequency, change in urine color  o Denies  o : dysuria, hematuria      Vitals  Date Time BP Position Site L\R Cuff Size HR RR TEMP (F) WT  HT  BMI kg/m2 BSA m2 O2 Sat FR L/min FiO2 HC       02/19/2021 02:04 /76 Sitting    96 - R   217lbs 2oz 5'  3\" 38.46 2.09 100 %            Physical Examination  · Constitutional  o Appearance  o : alert, in no acute distress, well developed, well-nourished  · Neck  o Inspection/Palpation  o : Supple, no masses or tenderness, no deformities, Trachea: Midline, ROM: with in normal limits, no neck stiffness, no lymphadenopathy  o Thyroid  o : no thyomegaly, no palpabale masses   · Respiratory  o Auscultation of Lungs  o : normal breath sounds throughout, no wheeze, rhonchi, or crackles  · Cardiovascular  o Heart  o : Regular rate and rhythm, Normal S1,S2 , No cardiac murmers, No S3 or S4 gallop or rubs  · Skin and Subcutaneous Tissue  o General Inspection  o : no rashes, normal skin color, warm and dry  o Digits and Nails  o : no clubbing, cyanosis, deformities or edema present, normal appearing " nails  · Neurologic  o Mental Status Examination  o : alert and oriented to time, place, and person. Gait and Station: normal gait, able to stand without difficulty  · Psychiatric  o Judgement and Insight  o : judgment and insight intact  o Mood and Affect  o : normal mood and affect     right foot good pedal pulses, skin warm to touch, no cyanosis noted  left foot-pedal pulses decreased, skin cool to touch, no cyanosis noted.           Results  · In-Office Procedures  o Lab procedure  § IOP - Urinalysis without Microscopy (Clinitek) [facility] (47621)   § Color Ur: Yellow   § Clarity Ur: Clear   § Glucose Ur Ql Strip: Negative   § Bilirub Ur Ql Strip: Negative   § Ketones Ur Ql Strip: Negative   § Sp Gr Ur Qn: 1.030   § Hgb Ur Ql Strip: Trace-Intact   § pH Ur-LsCnc: 5.5   § Prot Ur Ql Strip: Negative   § Urobilinogen Ur Strip-mCnc: 0.2 E.U./dL   § Nitrite Ur Ql Strip: Negative   § WBC Est Ur Ql Strip: Negative       Assessment  · Anxiety disorder     300.00/F41.9  · Hyperlipidemia     272.4/E78.5  · Ischemia of foot     459.9/I99.8  · Neuropathic pain of foot, right     355.8/G57.91  · Peripheral vascular disease     443.9/I73.9  · Urticaria     708.9/L50.9  · Insomnia     780.52/G47.00  · UTI (urinary tract infection)     599.0/N39.0  · Hematuria     599.70/R31.9  · Routine lab draw     V72.60/Z01.89  · Screening for thyroid disorder     V77.0/Z13.29      Plan  · Orders  o Physical, Primary Care Panel (CBC, CMP, Lipid, TSH) [facility] (19863, 32221, 61761, 69418) - 272.4/E78.5, V72.60/Z01.89, 599.70/R31.9, V77.0/Z13.29 - 02/19/2021  o Vitamin D (25-Hydroxy) Level (78484) - V72.60/Z01.89 - 02/19/2021  o ACO-14: Influenza immunization was not administered for reasons documented [facility] () - - 02/19/2021  o ACO-39: Current medications updated and reviewed (, 1159F) - - 02/19/2021  o Urine Culture (Clean Catch) [facility] (18529) - 599.70/R31.9, 599.0/N39.0 - 02/19/2021  o Urinalysis with Reflex Microscopy if abnormal ([facility]) (97254)  - 599.70/R31.9 - 02/19/2021  o Vitamin B12 level (16930) - V72.60/Z01.89 - 02/19/2021  · Medications  o Macrobid 100 mg oral capsule   SIG: take 1 capsule (100 mg) by oral route every 12 hours with food for 7 days   DISP: (14) Capsule with 0 refills  Prescribed on 02/19/2021     o Diflucan 150 mg oral tablet   SIG: take 1 tablet (150 mg) by oral route once   DISP: (1) Tablet with 0 refills  Prescribed on 02/19/2021     o Medications have been Reconciled  o Transition of Care or Provider Policy  · Instructions  o Advised that cheeses and other sources of dairy fats, animal fats, fast food, and the extras (candy, pastries, pies, doughnuts and cookies) all contain LDL raising nutrients. Advised to increase fruits, vegetables, whole grains, and to monitor portion sizes.   o Take all medications as prescribed/directed.  o Patient was educated/instructed on their diagnosis, treatment and medications prior to discharge from the clinic today.  o Patient instructed to seek medical attention urgently for new or worsening symptoms.  o Call the office with any concerns or questions.  o 3 month follow up  o Electronically Identified Patient Education Materials Provided Electronically  · Disposition  o Call or Return if symptoms worsen or persist.            Electronically Signed by: REG Isaac -Author on February 19, 2021 03:17:09 PM

## 2021-05-15 VITALS
WEIGHT: 216 LBS | HEIGHT: 62 IN | HEART RATE: 99 BPM | SYSTOLIC BLOOD PRESSURE: 138 MMHG | DIASTOLIC BLOOD PRESSURE: 73 MMHG | OXYGEN SATURATION: 100 % | BODY MASS INDEX: 39.75 KG/M2

## 2021-05-15 VITALS
WEIGHT: 213.37 LBS | OXYGEN SATURATION: 98 % | HEART RATE: 90 BPM | SYSTOLIC BLOOD PRESSURE: 114 MMHG | BODY MASS INDEX: 39.26 KG/M2 | HEIGHT: 62 IN | DIASTOLIC BLOOD PRESSURE: 59 MMHG

## 2021-05-15 VITALS
OXYGEN SATURATION: 100 % | HEIGHT: 63 IN | WEIGHT: 215 LBS | SYSTOLIC BLOOD PRESSURE: 142 MMHG | DIASTOLIC BLOOD PRESSURE: 69 MMHG | BODY MASS INDEX: 38.09 KG/M2 | HEART RATE: 89 BPM

## 2021-05-15 VITALS
BODY MASS INDEX: 36.19 KG/M2 | HEIGHT: 64 IN | DIASTOLIC BLOOD PRESSURE: 90 MMHG | SYSTOLIC BLOOD PRESSURE: 142 MMHG | WEIGHT: 212 LBS | HEART RATE: 88 BPM

## 2021-05-15 LAB
METANEPH FREE SERPL-SCNC: <10 PG/ML (ref 0–88)
NORMETANEPHRINE, PL: 54.7 PG/ML (ref 0–125.8)

## 2021-05-16 VITALS
BODY MASS INDEX: 38.14 KG/M2 | WEIGHT: 207.25 LBS | DIASTOLIC BLOOD PRESSURE: 76 MMHG | HEIGHT: 62 IN | HEART RATE: 96 BPM | SYSTOLIC BLOOD PRESSURE: 136 MMHG

## 2021-05-16 VITALS
OXYGEN SATURATION: 97 % | HEIGHT: 62 IN | BODY MASS INDEX: 39.01 KG/M2 | HEART RATE: 95 BPM | SYSTOLIC BLOOD PRESSURE: 122 MMHG | DIASTOLIC BLOOD PRESSURE: 73 MMHG | WEIGHT: 212 LBS

## 2021-05-16 VITALS
BODY MASS INDEX: 38.46 KG/M2 | HEIGHT: 62 IN | WEIGHT: 209 LBS | SYSTOLIC BLOOD PRESSURE: 135 MMHG | HEART RATE: 93 BPM | DIASTOLIC BLOOD PRESSURE: 77 MMHG

## 2021-05-17 ENCOUNTER — CONVERSION ENCOUNTER (OUTPATIENT)
Dept: SURGERY | Facility: CLINIC | Age: 41
End: 2021-05-17

## 2021-05-17 ENCOUNTER — PROCEDURE VISIT CONVERTED (OUTPATIENT)
Dept: UROLOGY | Facility: CLINIC | Age: 41
End: 2021-05-17
Attending: UROLOGY

## 2021-05-19 ENCOUNTER — CONVERSION ENCOUNTER (OUTPATIENT)
Dept: OTHER | Facility: HOSPITAL | Age: 41
End: 2021-05-19

## 2021-05-19 ENCOUNTER — OFFICE VISIT CONVERTED (OUTPATIENT)
Dept: FAMILY MEDICINE CLINIC | Facility: CLINIC | Age: 41
End: 2021-05-19
Attending: NURSE PRACTITIONER

## 2021-06-05 NOTE — PROCEDURES
Procedure Note      Patient Name: Janki Krueger   Patient ID: [id number]   Sex: Female   YOB: 1980    Primary Care Provider: Caitlin FINNEY   Referring Provider: Caitlin FINNEY    Visit Date: May 17, 2021    Provider: Amanda Frost MD   Location: [facility]   Location Address: [address] [place] [address]  [id number]   Location Phone: [phone]          Cystoscopy Procedure:  PROCEDURE: Flexible cystoscope was passed per urethra into the bladder without difficulty after proper consent. The bladder was inspected in a systematic meridian fashion. There were no tumors, lesions, stones, or other abnormalities noted within the bladder. Of note, there was no increased vascularity as well. Both ureteral orifices were identified and were normal in appearance. The flexible cystoscope was removed. The patient tolerated the procedure well.   FOLLOW UP OFFICE NOTE: The CT scan of the Abdomen/Pelvis was abnormal. and She had an adrenal adenoma and functional workup was negative.           Assessment  · Hematuria     599.70/R31.9  · Adrenal adenoma     227.0/D35.00      Plan  · Orders  o Cystoscopy (46486) - 599.70/R31.9 - 05/17/2021  · Instructions  o We will follow up in one year or sooner if needed.  o TIME OUT PROCEDURE: Correct patient and birth date; Correct procedure; Correct Physician; Consent signed  o Her workup for hematuria is negative.             Electronically Signed by: Amanda Frost MD -Author on May 17, 2021 05:29:25 PM

## 2021-06-05 NOTE — PROGRESS NOTES
Progress Note      Patient Name: Janki Krueger   Patient ID: [id number]   Sex: Female   YOB: 1980    Primary Care Provider: Caitlin FINNEY   Referring Provider: Caitlin FINNEY    Visit Date: May 19, 2021    Provider: REG Isaac   [place] [facility] [place]   Location Address: [address] [place] [address], [address]  [place], [place]  [id number]   Location Phone: [phone]          Chief Complaint  · 3 month follow up       History Of Present Illness  Janki Krueger is a 40 year old /White female who presents for evaluation and treatment of:      The patient is here for 3 month follow up medication.     The patient had her PHQ-9 on 9/8/2020, Medicare Annual Wellness due 9/9/2021.     Erik 4/13/2021, Narcotic consent 2/19/2021, UDS 9/8/2020    she saw urology since her last visit-states she saw -she wanted to do a cysto to assess if the mass on her adrenal gland was functioniong or nonfunctioning. She also had labs. She states everything looked fine and her labs were normal and to follow up in 1 yr. Reviewed labs-Her cortisol level was low all other labs WNL-she is concerned with the cortisol level-ordered repeat cortisol level in 1m.    vit D def-she is taking vit D 50,000 units once wk-request rf-refilled today.           Past Medical History  Disease Name Date Onset Notes   High cholesterol --  --    Insomnia --  --    Ischemia of foot 05/01/2016 --    Neuropathic pain of foot, right 03/14/2016 --    Pap smear for cervical cancer screening 2018 Dr. Muse normal   Peripheral vascular disease 12/13/2014 --    Screening for breast cancer 09/2020 cysts stable normal   Urticaria 07/25/2016 --          Past Surgical History  Procedure Name Date Notes   *Other 2014 aortabifemoral bypass   C-sections 2005 --    Stent placment 2017 one in each leg         Medication List  Name Date Started Instructions   Ativan 0.5 mg oral tablet 11/19/2020 take 1 tablet (0.5 mg)  by oral route 3 times per day as needed for 30 days   Enteric Coated Aspirin 81 mg oral tablet,delayed release (DR/EC) 2018 take 1 tablet (81 mg) by oral route once daily for 90 days   Neurontin 300 mg oral capsule 2021 take 1 capsule (300 mg) by oral route at hs   [place] 7.5-325 mg oral tablet 2021 one po TID PRN for severe pain (8-10)   Plavix 75 mg oral tablet  take 1 tablet (75 mg) by oral route once daily   pravastatin 80 mg oral tablet 05/10/2021 take 1 tablet (80 mg) by oral route once daily   Tylenol PM Extra Strength  mg oral tablet  one po HS   Vitamin D2 1,250 mcg (50,000 unit) oral capsule 2021 take 1 capsule by oral route once a week for 90 days         Allergy List  Allergen Name Date Reaction Notes   NO KNOWN DRUG ALLERGIES --  --  --          Family Medical History  Disease Name Relative/Age Notes   Lung Neoplasm, Malignant Grandmother (paternal)/   --    Diabetes, unspecified type Father/   --    Throat Cancer Grandmother (paternal)/   --    Heart Attack (MI) Father/   --    Prostate cancer Grandfather (maternal)/   --          Reproductive History  Menstrual   Age Menarche: 10   Pregnancy Summary   Total Pregnancies: 2 Full Term: 1 Premature: 0   Ab Induced: 0 Ab Spontaneous: 1 Ectopics: 0   Multiples: 0 Livin         Social History  Finding Status Start/Stop Quantity Notes   Active but no formal exercise --  --/-- --  --    Alcohol Never --/-- --  2021 - 10/23/2019 -     --  --/-- --  --    No known infection risk --  --/-- --  --    Tobacco Former  pk Quit after surgery in Dec.         Immunizations  NameDate Admin Mfg Trade Name Lot Number Route Inj VIS Given VIS Publication   InfluenzaRefused 2020 NE Not Entered  NE NE     Comments:    TdapDeferred 2016 NE Not Entered  NE NE     Comments:          Review of Systems  · Constitutional  o Denies  o : fever, fatigue, weight loss, weight gain  · Cardiovascular  o Denies  o : pedal  "edema, claudication, chest pressure, palpitations  · Respiratory  o Denies  o : shortness of breath, wheezing, cough, hemoptysis, dyspnea on exertion  · Gastrointestinal  o Denies  o : nausea, vomiting, diarrhea, constipation, abdominal pain      Vitals  Date Time BP Position Site L\R Cuff Size HR RR TEMP (F) WT  HT  BMI kg/m2 BSA m2 O2 Sat FR L/min FiO2 HC       05/19/2021 02:41 /72 Sitting    100 - R   216lbs 0oz 5'  2\" 39.51 2.07 100 %            Physical Examination  · Constitutional  o Appearance  o : alert, in no acute distress, well developed, well-nourished  · Neck  o Thyroid  o : no thyomegaly, no palpabale masses   · Respiratory  o Auscultation of Lungs  o : normal breath sounds throughout, no wheeze, rhonchi, or crackles  · Cardiovascular  o Heart  o : Regular rate and rhythm, Normal S1,S2 , No cardiac murmers, No S3 or S4 gallop or rubs  · Skin and Subcutaneous Tissue  o General Inspection  o : no rashes, normal skin color, warm and dry  o Digits and Nails  o : no clubbing, cyanosis, deformities or edema present, normal appearing nails  · Neurologic  o Mental Status Examination  o : alert and oriented to time, place, and person. Gait and Station: normal gait, able to stand without difficulty  · Psychiatric  o Judgement and Insight  o : judgment and insight intact  o Mood and Affect  o : normal mood and affect          Assessment  · Vitamin D deficiency     268.9/E55.9  · Ischemia of foot     459.9/I99.8  · Neuropathic pain of foot, right     355.8/G57.91  · Peripheral vascular disease     443.9/I73.9  · Insomnia     780.52/G47.00  · Abnormal cortisol level     790.6/R79.89      Plan  · Orders  o ACO - Pt declines to or was not able to provide an Advance Care Plan or name a Surrogate Decision Maker (1124F) - - 05/19/2021  o ACO-39: Current medications updated and reviewed (1159F, ) - - 05/19/2021  o Cortisol am (25422) - 790.6/R79.89 - 06/19/2021  · Medications  o Vitamin D2 1,250 mcg (50,000 " unit) oral capsule   SIG: take 1 capsule by oral route once a week for 90 days   DISP: (13) Capsule with 0 refills  Refilled on 05/19/2021     · Instructions  o Take all medications as prescribed/directed.  o Patient was educated/instructed on their diagnosis, treatment and medications prior to discharge from the clinic today.  o Patient instructed to seek medical attention urgently for new or worsening symptoms.  o Call the office with any concerns or questions.  o 3 month follow up  o Electronically Identified Patient Education Materials Provided Electronically  · Disposition  o Call or Return if symptoms worsen or persist.            Electronically Signed by: Caitlin Mohamud APRN -Author on May 20, 2021 10:27:58 PM

## 2021-06-08 ENCOUNTER — TRANSCRIBE ORDERS (OUTPATIENT)
Dept: VASCULAR SURGERY | Facility: HOSPITAL | Age: 41
End: 2021-06-08

## 2021-06-08 DIAGNOSIS — I73.9 CLAUDICATION (HCC): Primary | ICD-10-CM

## 2021-06-23 DIAGNOSIS — G89.4 CHRONIC PAIN SYNDROME: Primary | ICD-10-CM

## 2021-06-23 RX ORDER — HYDROCODONE BITARTRATE AND ACETAMINOPHEN 7.5; 325 MG/1; MG/1
1 TABLET ORAL 3 TIMES DAILY
COMMUNITY
Start: 2021-05-26 | End: 2021-06-23 | Stop reason: SDUPTHER

## 2021-06-23 NOTE — TELEPHONE ENCOUNTER
Caller: Janki Krueger    Relationship: Self    Best call back number: [phone]     Medication needed:   Requested Prescriptions      No prescriptions requested or ordered in this encounter   NORCO 7.5    When do you need the refill by: 3 DAYS    What additional details did the patient provide when requesting the medication:     Does the patient have less than a 3 day supply:  [] Yes  [x] No    What is the patient's preferred pharmacy: [facility] [place]  [place] - [address] [facility] [address] - [phone] [facility] [phone] FX

## 2021-06-24 RX ORDER — HYDROCODONE BITARTRATE AND ACETAMINOPHEN 7.5; 325 MG/1; MG/1
1 TABLET ORAL 3 TIMES DAILY PRN
Qty: 90 TABLET | Refills: 0 | Status: SHIPPED | OUTPATIENT
Start: 2021-06-24 | End: 2021-07-24

## 2021-07-12 ENCOUNTER — HOSPITAL ENCOUNTER (OUTPATIENT)
Dept: CARDIOLOGY | Facility: HOSPITAL | Age: 41
Discharge: HOME | End: 2021-07-12
Admitting: SURGERY

## 2021-07-12 DIAGNOSIS — I73.9 CLAUDICATION (HCC): ICD-10-CM

## 2021-07-12 LAB
BH CV LOWER ARTERIAL LEFT ABI RATIO: 0.9
BH CV LOWER ARTERIAL LEFT DORSALIS PEDIS SYS MAX: 110 MMHG
BH CV LOWER ARTERIAL LEFT GREAT TOE SYS MAX: 95 MMHG
BH CV LOWER ARTERIAL LEFT LOW THIGH SYS MAX: 123 MMHG
BH CV LOWER ARTERIAL LEFT POPLITEAL SYS MAX: 133 MMHG
BH CV LOWER ARTERIAL LEFT POST TIBIAL SYS MAX: 129 MMHG
BH CV LOWER ARTERIAL LEFT TBI RATIO: 0.66
BH CV LOWER ARTERIAL RIGHT ABI RATIO: 0.98
BH CV LOWER ARTERIAL RIGHT DORSALIS PEDIS SYS MAX: 130 MMHG
BH CV LOWER ARTERIAL RIGHT GREAT TOE SYS MAX: 123 MMHG
BH CV LOWER ARTERIAL RIGHT LOW THIGH SYS MAX: 128 MMHG
BH CV LOWER ARTERIAL RIGHT POPLITEAL SYS MAX: 146 MMHG
BH CV LOWER ARTERIAL RIGHT POST TIBIAL SYS MAX: 141 MMHG
BH CV LOWER ARTERIAL RIGHT TBI RATIO: 0.85
MAXIMAL PREDICTED HEART RATE: 178 BPM
STRESS TARGET HR: 151 BPM
UPPER ARTERIAL LEFT ARM BRACHIAL SYS MAX: 144 MMHG
UPPER ARTERIAL RIGHT ARM BRACHIAL SYS MAX: 135 MMHG

## 2021-07-12 PROCEDURE — 93923 UPR/LXTR ART STDY 3+ LVLS: CPT | Performed by: SURGERY

## 2021-07-12 PROCEDURE — 93923 UPR/LXTR ART STDY 3+ LVLS: CPT

## 2021-07-15 VITALS
DIASTOLIC BLOOD PRESSURE: 72 MMHG | WEIGHT: 216 LBS | HEIGHT: 62 IN | BODY MASS INDEX: 39.75 KG/M2 | HEART RATE: 100 BPM | OXYGEN SATURATION: 100 % | SYSTOLIC BLOOD PRESSURE: 126 MMHG

## 2021-07-15 VITALS
HEIGHT: 63 IN | BODY MASS INDEX: 38.27 KG/M2 | WEIGHT: 216 LBS | SYSTOLIC BLOOD PRESSURE: 133 MMHG | DIASTOLIC BLOOD PRESSURE: 70 MMHG

## 2021-07-19 DIAGNOSIS — G89.4 CHRONIC PAIN SYNDROME: Primary | ICD-10-CM

## 2021-07-19 RX ORDER — GABAPENTIN 300 MG/1
300 CAPSULE ORAL
COMMUNITY
Start: 2021-04-13 | End: 2021-07-19 | Stop reason: SDUPTHER

## 2021-07-19 RX ORDER — LORAZEPAM 0.5 MG/1
0.5 TABLET ORAL 3 TIMES DAILY PRN
COMMUNITY
Start: 2021-04-22 | End: 2021-09-21

## 2021-07-19 RX ORDER — PRAVASTATIN SODIUM 80 MG/1
80 TABLET ORAL DAILY
COMMUNITY
Start: 2021-05-11 | End: 2021-11-01 | Stop reason: SDUPTHER

## 2021-07-19 RX ORDER — GABAPENTIN 300 MG/1
300 CAPSULE ORAL
Qty: 90 CAPSULE | Refills: 0 | Status: SHIPPED | OUTPATIENT
Start: 2021-07-19 | End: 2021-10-17 | Stop reason: SDUPTHER

## 2021-07-19 RX ORDER — CLOPIDOGREL BISULFATE 75 MG/1
TABLET ORAL
COMMUNITY
End: 2021-12-08

## 2021-07-19 RX ORDER — ERGOCALCIFEROL 1.25 MG/1
50000 CAPSULE ORAL WEEKLY
COMMUNITY
Start: 2021-05-19 | End: 2021-08-20 | Stop reason: SDUPTHER

## 2021-07-19 RX ORDER — ACETAMINOPHEN/DIPHENHYDRAMINE 500MG-25MG
TABLET ORAL
COMMUNITY
End: 2022-06-30

## 2021-07-28 ENCOUNTER — OFFICE VISIT (OUTPATIENT)
Dept: VASCULAR SURGERY | Facility: HOSPITAL | Age: 41
End: 2021-07-28

## 2021-07-28 ENCOUNTER — TRANSCRIBE ORDERS (OUTPATIENT)
Dept: ADMINISTRATIVE | Facility: HOSPITAL | Age: 41
End: 2021-07-28

## 2021-07-28 ENCOUNTER — LAB (OUTPATIENT)
Dept: LAB | Facility: HOSPITAL | Age: 41
End: 2021-07-28

## 2021-07-28 VITALS
DIASTOLIC BLOOD PRESSURE: 78 MMHG | RESPIRATION RATE: 16 BRPM | OXYGEN SATURATION: 97 % | SYSTOLIC BLOOD PRESSURE: 132 MMHG | TEMPERATURE: 97.6 F | HEART RATE: 92 BPM

## 2021-07-28 DIAGNOSIS — I73.9 PAD (PERIPHERAL ARTERY DISEASE) (HCC): Primary | ICD-10-CM

## 2021-07-28 DIAGNOSIS — G89.4 CHRONIC PAIN SYNDROME: Primary | ICD-10-CM

## 2021-07-28 DIAGNOSIS — R79.89 ABNORMAL CORTISOL LEVEL: ICD-10-CM

## 2021-07-28 DIAGNOSIS — I73.9 CLAUDICATION (HCC): ICD-10-CM

## 2021-07-28 DIAGNOSIS — R79.89 ABNORMAL CORTISOL LEVEL: Primary | ICD-10-CM

## 2021-07-28 LAB — CORTIS AM PEAK SERPL-MCNC: 6.6 MCG/DL (ref 6.02–18.4)

## 2021-07-28 PROCEDURE — 82533 TOTAL CORTISOL: CPT

## 2021-07-28 PROCEDURE — G0463 HOSPITAL OUTPT CLINIC VISIT: HCPCS

## 2021-07-28 PROCEDURE — 99213 OFFICE O/P EST LOW 20 MIN: CPT | Performed by: SURGERY

## 2021-07-28 PROCEDURE — 36415 COLL VENOUS BLD VENIPUNCTURE: CPT

## 2021-07-28 RX ORDER — HYDROCODONE BITARTRATE AND ACETAMINOPHEN 7.5; 325 MG/1; MG/1
1 TABLET ORAL EVERY 6 HOURS PRN
COMMUNITY
End: 2021-07-28 | Stop reason: SDUPTHER

## 2021-07-28 NOTE — PROGRESS NOTES
[facility]   Follow up Office    Patient Name: Janki Krueger  : 1980  MRN: [id number]  Primary Care Physician:  aCitlin Mohamud APRN      Subjective   Subjective     HPI:    Janki Krueger is a 40 y.o. female with known peripheral artery disease who previously underwent an aortobifemoral bypass graft in .  She is followed for claudication and peripheral artery disease knowing that her bypass graft was occluded at some point in the past as demonstrated by a CTA from 2020.  She indicates that she does have some leg pain with ambulation and sometimes even at rest but she feels that she is doing okay and is not lifestyle limiting at this time, is not interested in further intervention at this time.      Objective     Vitals:   Temp:  [97.6 °F (36.4 °C)] 97.6 °F (36.4 °C)  Heart Rate:  [92] 92  Resp:  [16] 16  BP: (132)/(78) 132/78    Physical Exam      General: Alert, no acute distress.  Neck: Supple  Heart: Regular rate  Lungs: Clear  Abdomen: Benign  Extremities: Symmetric     Diagnostic studies: An arterial Doppler from 2021 demonstrates excellent bilateral  ankle-brachial indices at rest as well as waveforms.    Assessment/Plan   Assessment / Plan     Diagnoses and all orders for this visit:    1. PAD (peripheral artery disease) (CMS/HCC) (Primary)  -     Doppler Arterial Multi Level Lower Extremity - Bilateral CAR; Future    2. Claudication (CMS/HCC)  -     Doppler Arterial Multi Level Lower Extremity - Bilateral CAR; Future       Assessment/Plan:   Janki has known peripheral artery disease but is doing well at this time and does not feel lifestyle limited.  I discussed with her that there is disease in her native circulation but her native circulation is patent.  If she has progression of disease we should intervene because it would be easier to address before becomes completely occluded.  At this time it may be an option to do an endovascular intervention versus  surgical approach.  The plan will be for her to follow-up in 6 months with a repeat arterial Doppler.        Electronically signed by Jonah Castañeda MD, 07/28/21, 8:53 AM EDT.

## 2021-07-29 ENCOUNTER — TELEPHONE (OUTPATIENT)
Dept: FAMILY MEDICINE CLINIC | Facility: CLINIC | Age: 41
End: 2021-07-29

## 2021-07-29 RX ORDER — HYDROCODONE BITARTRATE AND ACETAMINOPHEN 7.5; 325 MG/1; MG/1
1 TABLET ORAL EVERY 8 HOURS PRN
Qty: 90 TABLET | Refills: 0 | Status: SHIPPED | OUTPATIENT
Start: 2021-07-29 | End: 2021-08-27 | Stop reason: SDUPTHER

## 2021-08-19 ENCOUNTER — OFFICE VISIT (OUTPATIENT)
Dept: FAMILY MEDICINE CLINIC | Facility: CLINIC | Age: 41
End: 2021-08-19

## 2021-08-19 VITALS
HEIGHT: 63 IN | DIASTOLIC BLOOD PRESSURE: 78 MMHG | BODY MASS INDEX: 38.73 KG/M2 | HEART RATE: 108 BPM | OXYGEN SATURATION: 94 % | WEIGHT: 218.6 LBS | SYSTOLIC BLOOD PRESSURE: 145 MMHG

## 2021-08-19 DIAGNOSIS — R53.83 FATIGUE, UNSPECIFIED TYPE: Primary | ICD-10-CM

## 2021-08-19 DIAGNOSIS — R23.2 HOT FLASHES: ICD-10-CM

## 2021-08-19 DIAGNOSIS — E78.5 HYPERLIPIDEMIA, UNSPECIFIED HYPERLIPIDEMIA TYPE: ICD-10-CM

## 2021-08-19 DIAGNOSIS — D50.9 IRON DEFICIENCY ANEMIA, UNSPECIFIED IRON DEFICIENCY ANEMIA TYPE: ICD-10-CM

## 2021-08-19 DIAGNOSIS — R79.89 ABNORMAL CORTISOL LEVEL: ICD-10-CM

## 2021-08-19 PROBLEM — E78.00 HIGH CHOLESTEROL: Status: ACTIVE | Noted: 2021-08-19

## 2021-08-19 PROBLEM — G47.00 INSOMNIA: Status: ACTIVE | Noted: 2021-08-19

## 2021-08-19 PROCEDURE — 99213 OFFICE O/P EST LOW 20 MIN: CPT | Performed by: NURSE PRACTITIONER

## 2021-08-19 RX ORDER — PAROXETINE 10 MG/1
10 TABLET, FILM COATED ORAL EVERY MORNING
Qty: 30 TABLET | Refills: 1 | Status: SHIPPED | OUTPATIENT
Start: 2021-08-19 | End: 2021-09-21 | Stop reason: SDUPTHER

## 2021-08-19 RX ORDER — ASPIRIN 81 MG/1
81 TABLET, CHEWABLE ORAL DAILY
COMMUNITY

## 2021-08-19 NOTE — PROGRESS NOTES
"Chief Complaint  Vitamin D Deficiency (Patient is currently taking one a week on Thursdays and is doing well with this. Denies missing any doses. ), Fatigue (Patient complains that for over a month she has been tired and having little energy. Patient denies any changes to her Activities of daily living. ), Emotional/ Mood swings (Patient states that for about a week now she has been having mood swings and been very emotional. Patient denies anything that triggers these feelings. Patient states that she is not having periods as she had an ablasion in 2018. She states that she has also been having hot flashes. ), and Memory Loss (Patient states that this has been going on for a couple of months. She states that she can tell you somehting and then forget that she told you shortly after. )    Subjective          Janki Krueger presents to [facility]  History of Present Illness    She  has a past medical history of Anemia, Contact lens/glasses fitting, Depression, Heartburn, PONV (postoperative nausea and vomiting), and PVD (peripheral vascular disease) (CMS/HCC).     She c/o extreme fatigue and mood swings. States in the last wk her  could look at her the wrong way and she cries. States she is having bad hot flashes several times per day-she had an ablation in the past so she no longer has periods. Ordered labs to asses. Prescribed paxil 10mg qd.     Adrenal adenoma-she saw  and she did a cysto and labs-she was told both were normal and to repeat in 1 yr.     Objective   Vital Signs:   /78   Pulse 108   Ht 160 cm (63\")   Wt 99.2 kg (218 lb 9.6 oz)   SpO2 94%   BMI 38.72 kg/m²     Physical Exam  Constitutional:       Appearance: Normal appearance.   Neck:      Thyroid: No thyroid mass, thyromegaly or thyroid tenderness.   Cardiovascular:      Rate and Rhythm: Normal rate and regular rhythm.      Pulses: Normal pulses.      Heart sounds: Normal heart sounds. "   Pulmonary:      Effort: Pulmonary effort is normal.      Breath sounds: Normal breath sounds.   Musculoskeletal:      Right lower leg: No edema.      Left lower leg: No edema.   Neurological:      Mental Status: She is alert.        Result Review :            Assessment and Plan    Diagnoses and all orders for this visit:    1. Fatigue, unspecified type (Primary)  Comments:  ordered labs to assess underlying cause of fatigue  Orders:  -     CBC and Differential; Future  -     Comprehensive metabolic panel; Future  -     Vitamin B12; Future  -     Vitamin D 1,25 dihydroxy; Future  -     TSH; Future  -     Estradiol; Future  -     Progesterone; Future  -     Vitamin B12 & Folate    2. Hyperlipidemia, unspecified hyperlipidemia type  -     Lipid panel; Future    3. Hot flashes  Comments:   c/o extreme fatigue, hot flashes and mood swings. States in the last wk her  could look at her the wrong way and she cries. Prescribed Paxil 10mg qd  Orders:  -     PARoxetine (Paxil) 10 MG tablet; Take 1 tablet by mouth Every Morning.  Dispense: 30 tablet; Refill: 1  -     Estrogens, Total; Future  -     FSH & LH    4. Iron deficiency anemia, unspecified iron deficiency anemia type  -     CBC Auto Differential  -     Iron Profile  -     Ferritin    5. Abnormal cortisol level  -     Cortisol; Future    Other orders  -     Cancel: Iron; Future  -     Cancel: Testosterone, free, total; Future        Follow Up   Return in about 4 weeks (around 9/16/2021).  Patient was given instructions and counseling regarding her condition or for health maintenance advice. Please see specific information pulled into the AVS if appropriate.     Janki Krueger  reports that she quit smoking about 5 years ago. She has never used smokeless tobacco..

## 2021-08-20 RX ORDER — ERGOCALCIFEROL 1.25 MG/1
50000 CAPSULE ORAL WEEKLY
Qty: 12 CAPSULE | Refills: 1 | Status: SHIPPED | OUTPATIENT
Start: 2021-08-20 | End: 2022-02-16 | Stop reason: SDUPTHER

## 2021-08-20 NOTE — TELEPHONE ENCOUNTER
Incoming Refill Request      Medication requested (name and dose):   vitamin D (ERGOCALCIFEROL) 1.25 MG (22968 UT) capsule capsule    Pharmacy where request should be sent:   [facility]     Additional details provided by patient:   Wasn't sure if Caitlin wanted her to keep taking this medication because she forgot to state she needed a refill in last office visit     Best call back number:   [phone]    Does the patient have less than a 3 day supply:  [x] Yes  [] No    Patience Maharaj Rep  08/20/21, 08:53 EDT

## 2021-08-24 ENCOUNTER — LAB (OUTPATIENT)
Dept: LAB | Facility: HOSPITAL | Age: 41
End: 2021-08-24

## 2021-08-24 DIAGNOSIS — R53.83 FATIGUE, UNSPECIFIED TYPE: ICD-10-CM

## 2021-08-24 DIAGNOSIS — R79.89 ABNORMAL CORTISOL LEVEL: ICD-10-CM

## 2021-08-24 DIAGNOSIS — E78.5 HYPERLIPIDEMIA, UNSPECIFIED HYPERLIPIDEMIA TYPE: ICD-10-CM

## 2021-08-24 DIAGNOSIS — R23.2 HOT FLASHES: ICD-10-CM

## 2021-08-24 LAB
ALBUMIN SERPL-MCNC: 4.4 G/DL (ref 3.5–5.2)
ALBUMIN/GLOB SERPL: 1.6 G/DL
ALP SERPL-CCNC: 70 U/L (ref 39–117)
ALT SERPL W P-5'-P-CCNC: 34 U/L (ref 1–33)
ANION GAP SERPL CALCULATED.3IONS-SCNC: 9.7 MMOL/L (ref 5–15)
AST SERPL-CCNC: 25 U/L (ref 1–32)
BASOPHILS # BLD AUTO: 0.04 10*3/MM3 (ref 0–0.2)
BASOPHILS NFR BLD AUTO: 0.8 % (ref 0–1.5)
BILIRUB SERPL-MCNC: 0.2 MG/DL (ref 0–1.2)
BUN SERPL-MCNC: 7 MG/DL (ref 6–20)
BUN/CREAT SERPL: 10.4 (ref 7–25)
CALCIUM SPEC-SCNC: 9 MG/DL (ref 8.6–10.5)
CHLORIDE SERPL-SCNC: 107 MMOL/L (ref 98–107)
CHOLEST SERPL-MCNC: 150 MG/DL (ref 0–200)
CO2 SERPL-SCNC: 23.3 MMOL/L (ref 22–29)
CORTIS SERPL-MCNC: 7.33 MCG/DL
CREAT SERPL-MCNC: 0.67 MG/DL (ref 0.57–1)
DEPRECATED RDW RBC AUTO: 39.7 FL (ref 37–54)
EOSINOPHIL # BLD AUTO: 0.07 10*3/MM3 (ref 0–0.4)
EOSINOPHIL NFR BLD AUTO: 1.3 % (ref 0.3–6.2)
ERYTHROCYTE [DISTWIDTH] IN BLOOD BY AUTOMATED COUNT: 12.3 % (ref 12.3–15.4)
ESTRADIOL SERPL HS-MCNC: 108 PG/ML
FERRITIN SERPL-MCNC: 162 NG/ML (ref 13–150)
FOLATE SERPL-MCNC: 4.32 NG/ML (ref 4.78–24.2)
FSH SERPL-ACNC: 4.42 MIU/ML
GFR SERPL CREATININE-BSD FRML MDRD: 97 ML/MIN/1.73
GLOBULIN UR ELPH-MCNC: 2.7 GM/DL
GLUCOSE SERPL-MCNC: 83 MG/DL (ref 65–99)
HCT VFR BLD AUTO: 42.8 % (ref 34–46.6)
HDLC SERPL-MCNC: 40 MG/DL (ref 40–60)
HGB BLD-MCNC: 14.6 G/DL (ref 12–15.9)
IMM GRANULOCYTES # BLD AUTO: 0.02 10*3/MM3 (ref 0–0.05)
IMM GRANULOCYTES NFR BLD AUTO: 0.4 % (ref 0–0.5)
IRON 24H UR-MRATE: 136 MCG/DL (ref 37–145)
IRON SATN MFR SERPL: 46 % (ref 20–50)
LDLC SERPL CALC-MCNC: 92 MG/DL (ref 0–100)
LDLC/HDLC SERPL: 2.28 {RATIO}
LH SERPL-ACNC: 9.88 MIU/ML
LYMPHOCYTES # BLD AUTO: 1.7 10*3/MM3 (ref 0.7–3.1)
LYMPHOCYTES NFR BLD AUTO: 32.2 % (ref 19.6–45.3)
MCH RBC QN AUTO: 30.4 PG (ref 26.6–33)
MCHC RBC AUTO-ENTMCNC: 34.1 G/DL (ref 31.5–35.7)
MCV RBC AUTO: 89 FL (ref 79–97)
MONOCYTES # BLD AUTO: 0.41 10*3/MM3 (ref 0.1–0.9)
MONOCYTES NFR BLD AUTO: 7.8 % (ref 5–12)
NEUTROPHILS NFR BLD AUTO: 3.04 10*3/MM3 (ref 1.7–7)
NEUTROPHILS NFR BLD AUTO: 57.5 % (ref 42.7–76)
NRBC BLD AUTO-RTO: 0.2 /100 WBC (ref 0–0.2)
PLATELET # BLD AUTO: 236 10*3/MM3 (ref 140–450)
PMV BLD AUTO: 9.9 FL (ref 6–12)
POTASSIUM SERPL-SCNC: 4.2 MMOL/L (ref 3.5–5.2)
PROGEST SERPL-MCNC: 0.3 NG/ML
PROT SERPL-MCNC: 7.1 G/DL (ref 6–8.5)
RBC # BLD AUTO: 4.81 10*6/MM3 (ref 3.77–5.28)
SODIUM SERPL-SCNC: 140 MMOL/L (ref 136–145)
TIBC SERPL-MCNC: 297 MCG/DL (ref 298–536)
TRANSFERRIN SERPL-MCNC: 199 MG/DL (ref 200–360)
TRIGL SERPL-MCNC: 95 MG/DL (ref 0–150)
TSH SERPL DL<=0.05 MIU/L-ACNC: 2.06 UIU/ML (ref 0.27–4.2)
VIT B12 BLD-MCNC: 453 PG/ML (ref 211–946)
VIT B12 BLD-MCNC: 462 PG/ML (ref 211–946)
VLDLC SERPL-MCNC: 18 MG/DL (ref 5–40)
WBC # BLD AUTO: 5.28 10*3/MM3 (ref 3.4–10.8)

## 2021-08-24 PROCEDURE — 82652 VIT D 1 25-DIHYDROXY: CPT

## 2021-08-24 PROCEDURE — 84466 ASSAY OF TRANSFERRIN: CPT | Performed by: NURSE PRACTITIONER

## 2021-08-24 PROCEDURE — 82728 ASSAY OF FERRITIN: CPT | Performed by: NURSE PRACTITIONER

## 2021-08-24 PROCEDURE — 82607 VITAMIN B-12: CPT | Performed by: NURSE PRACTITIONER

## 2021-08-24 PROCEDURE — 82746 ASSAY OF FOLIC ACID SERUM: CPT | Performed by: NURSE PRACTITIONER

## 2021-08-24 PROCEDURE — 84144 ASSAY OF PROGESTERONE: CPT

## 2021-08-24 PROCEDURE — 83002 ASSAY OF GONADOTROPIN (LH): CPT | Performed by: NURSE PRACTITIONER

## 2021-08-24 PROCEDURE — 36415 COLL VENOUS BLD VENIPUNCTURE: CPT

## 2021-08-24 PROCEDURE — 82607 VITAMIN B-12: CPT

## 2021-08-24 PROCEDURE — 83540 ASSAY OF IRON: CPT | Performed by: NURSE PRACTITIONER

## 2021-08-24 PROCEDURE — 83001 ASSAY OF GONADOTROPIN (FSH): CPT | Performed by: NURSE PRACTITIONER

## 2021-08-24 PROCEDURE — 84443 ASSAY THYROID STIM HORMONE: CPT

## 2021-08-24 PROCEDURE — 85025 COMPLETE CBC W/AUTO DIFF WBC: CPT

## 2021-08-24 PROCEDURE — 80053 COMPREHEN METABOLIC PANEL: CPT

## 2021-08-24 PROCEDURE — 82533 TOTAL CORTISOL: CPT

## 2021-08-24 PROCEDURE — 82672 ASSAY OF ESTROGEN: CPT

## 2021-08-24 PROCEDURE — 82670 ASSAY OF TOTAL ESTRADIOL: CPT

## 2021-08-24 PROCEDURE — 80061 LIPID PANEL: CPT

## 2021-08-26 LAB — 1,25(OH)2D SERPL-MCNC: 62.8 PG/ML (ref 19.9–79.3)

## 2021-08-27 DIAGNOSIS — G89.4 CHRONIC PAIN SYNDROME: ICD-10-CM

## 2021-08-27 RX ORDER — HYDROCODONE BITARTRATE AND ACETAMINOPHEN 7.5; 325 MG/1; MG/1
1 TABLET ORAL EVERY 8 HOURS PRN
Qty: 90 TABLET | Refills: 0 | Status: SHIPPED | OUTPATIENT
Start: 2021-08-27 | End: 2021-09-29 | Stop reason: SDUPTHER

## 2021-08-28 LAB — ESTROGEN SERPL-MCNC: 196 PG/ML

## 2021-08-30 ENCOUNTER — TELEPHONE (OUTPATIENT)
Dept: FAMILY MEDICINE CLINIC | Facility: CLINIC | Age: 41
End: 2021-08-30

## 2021-09-13 NOTE — TELEPHONE ENCOUNTER
Medicare Wellness Visit  Plan for Preventive Care    A good way for you to stay healthy is to use preventive care.  Medicare covers many services that can help you stay healthy.* The goal of these services is to find any health problems as quickly as possible. Finding problems early can help make them easier to treat.  Your personal plan below lists the services you may need and when they are due.     Health Maintenance Summary     DTaP/Tdap/Td Vaccine (1 - Tdap)  Overdue since 1/2/1998    Shingles Vaccine (1 of 2)  Overdue - never done    Pneumococcal Vaccine 65+ (1 of 1 - PPSV23)  Order placed this encounter    Influenza Vaccine (1)  Due since 9/1/2021    Medicare Wellness Visit (Yearly)  Next due on 9/13/2022    Depression Screening (Yearly)  Next due on 9/13/2022    Colonoscopy Risk (Every 5 Years)  Next due on 4/6/2026    Hepatitis C Screening   Completed    Hepatitis B Vaccine   Aged Out    Meningococcal Vaccine   Aged Out    COVID-19 Vaccine   Completed    HPV Vaccine   Aged Out           Preventive Care for Women and Men    Heart Screenings (Cardiovascular):  · Blood tests are used to check your cholesterol, lipid and triglyceride levels. High levels can increase your risk for heart disease and stroke. High levels can be treated with medications, diet and exercise. Lowering your levels can help keep your heart and blood vessels healthy.  Your provider will order these tests if they are needed.    · An ultrasound is done to see if you have an abdominal aortic aneurysm (AAA).  This is an enlargement of one of the main blood vessels that delivers blood to the body.   In the [place], 9,000 deaths are caused by AAA.  You may not even know you have this problem and as many as 1 in 3 people will have a serious problem if it is not treated.  Early diagnosis allows for more effective treatment and cure.  If you have a family history of AAA or are a male age 65-75 who has smoked, you are at higher risk of an  Refill on Gabapentin    AAA.  Your provider can order this test, if needed.    Colorectal Screening:  · There are many tests that are used to check for cancer of your colon and rectum. You and your provider should discuss what test is best for you and when to have it done.  Options include:  · Screening Colonoscopy: exam of the entire colon, seen through a flexible lighted tube.  · Flexible Sigmoidoscopy: exam of the last third (sigmoid portion) of the colon and rectum, seen through a flexible lighted tube.  · Cologuard DNA stool test: a sample of your stool is used to screen for cancer and unseen blood in your stool.  · Fecal Occult Blood Test: a sample of your stool is studied to find any unseen blood    Flu Shot:  · An immunization that helps to prevent influenza (the flu). You should get this every year. The best time to get the shot is in the fall.    Pneumococcal Shot:  • Vaccines are available that can help prevent pneumococcal disease, which is any type of infection caused by Streptococcus pneumoniae bacteria.   Their use can prevent some cases of pneumonia, meningitis, and sepsis. There are two types of pneumococcal vaccines:   o Conjugate vaccines (PCV-13 or Prevnar 13®) - helps protect against the 13 types of pneumococcal bacteria that are the most common causes of serious infections in children and adults.    o Polysaccharide vaccine (PPSV23 or Auvwcsory68®) - helps protect against 23 types of pneumococcal bacteria for patients who are recommended to get it.  These vaccines should be given at least 12 months apart.  A booster is usually not needed.     Hepatitis B Shot:  · An immunization that helps to protect people from getting Hepatitis B. Hepatitis B is a virus that spreads through contact with infected blood or body fluids. Many people with the virus do not have symptoms.  The virus can lead to serious problems, such as liver disease. Some people are at higher risk than others. Your doctor will tell you if you need this  shot.     Diabetes Screening:  · A test to measure sugar (glucose) in your blood is called a fasting blood sugar. Fasting means you cannot have food or drink for at least 8 hours before the test. This test can detect diabetes long before you may notice symptoms.    Glaucoma Screening:  · Glaucoma screening is performed by your eye doctor. The test measures the fluid pressure inside your eyes to determine if you have glaucoma.     Hepatitis C Screening:  · A blood test to see if you have the hepatitis C virus.  Hepatitis C attacks the liver and is a major cause of chronic liver disease.  Medicare will cover a single screening for all adults born between 1945 & 1965, or high risk patients (people who have injected illegal drugs or people who have had blood transfusions).  High risk patients who continue to inject illegal drugs can be screened for Hepatitis C every year.    Smoking and Tobacco-Use Cessation Counseling:  · Tobacco is the single greatest cause of disease and early death in our country today. Medication and counseling together can increase a person’s chance of quitting for good.   · Medicare covers two quitting attempts per year, with four counseling sessions per attempt (eight sessions in a 12 month period)    Preventive Screening tests for Women    Screening Mammograms and Breast Exams:  · An x-ray of your breasts to check for breast cancer before you or your doctor may be able to feel it.  If breast cancer is found early it can usually be treated with success.    Pelvic Exams and Pap Tests:  · An exam to check for cervical and vaginal cancer. A Pap test is a lab test in which cells are taken from your cervix and sent to the lab to look for signs of cervical cancer. If cancer of the cervix is found early, chances for a cure are good. Testing can generally end at age 65, or if a woman has a hysterectomy for a benign condition. Your provider may recommend more frequent testing if certain abnormal results  are found.    Bone Mass Measurements:  · A painless x-ray of your bone density to see if you are at risk for a broken bone. Bone density refers to the thickness of bones or how tightly the bone tissue is packed.    Preventive Screening tests for Men    Prostate Screening:  · Should you have a prostate cancer test (PSA)?  It is up to you to decide if you want a prostate cancer test. Talk to your clinician to find out if the test is right for you.  Things for you to consider and talk about should include:  · Benefits and harms of the test  · Your family history  · How your race/ethnicity may influence the test  · If the test may impact other medical conditions you have  · Your values on screenings and treatments    *Medicare pays for many preventive services to keep you healthy. For some of these services, you might have to pay a deductible, coinsurance, and / or copayment.  The amounts vary depending on the type of services you need and the kind of Medicare health plan you have.    For further details on screenings offered by Medicare please visit: https://www.medicare.gov/coverage/preventive-screening-services

## 2021-09-21 ENCOUNTER — OFFICE VISIT (OUTPATIENT)
Dept: FAMILY MEDICINE CLINIC | Facility: CLINIC | Age: 41
End: 2021-09-21

## 2021-09-21 VITALS
OXYGEN SATURATION: 100 % | DIASTOLIC BLOOD PRESSURE: 78 MMHG | BODY MASS INDEX: 48.02 KG/M2 | WEIGHT: 271 LBS | SYSTOLIC BLOOD PRESSURE: 141 MMHG | HEIGHT: 63 IN | HEART RATE: 86 BPM

## 2021-09-21 DIAGNOSIS — Z12.31 ENCOUNTER FOR SCREENING MAMMOGRAM FOR MALIGNANT NEOPLASM OF BREAST: Primary | ICD-10-CM

## 2021-09-21 DIAGNOSIS — Z79.899 ENCOUNTER FOR LONG-TERM CURRENT USE OF MEDICATION: ICD-10-CM

## 2021-09-21 DIAGNOSIS — R23.2 HOT FLASHES: ICD-10-CM

## 2021-09-21 DIAGNOSIS — F41.9 ANXIETY AND DEPRESSION: ICD-10-CM

## 2021-09-21 DIAGNOSIS — F32.A ANXIETY AND DEPRESSION: ICD-10-CM

## 2021-09-21 PROCEDURE — 80305 DRUG TEST PRSMV DIR OPT OBS: CPT | Performed by: NURSE PRACTITIONER

## 2021-09-21 PROCEDURE — 99213 OFFICE O/P EST LOW 20 MIN: CPT | Performed by: NURSE PRACTITIONER

## 2021-09-21 RX ORDER — PAROXETINE 10 MG/1
10 TABLET, FILM COATED ORAL EVERY MORNING
Qty: 90 TABLET | Refills: 1 | Status: SHIPPED | OUTPATIENT
Start: 2021-09-21 | End: 2021-12-21 | Stop reason: DRUGHIGH

## 2021-09-21 NOTE — PROGRESS NOTES
"Chief Complaint  Depression (1 month f/u), Fatigue (still having ), and Anxiety    Subjective          Janki Krueger presents to [facility]  Pt states that Paxil is working feel like and the dose is good.  Reports her mood swings and hot flashes and insomnia improved since starting the Paxil. Denies having crying spells since starting. Admits her  and son have noticed an improvement since starting. She is no longer waking w/hot flashes states she has some during the day but tolerable. Sates she no longer has has chest tightness since being on the medication-states she was worked up by cardiology in the past and it was deemed the chest tightness was r/t anxiety. Denies any side effects of the medication. Admits she is no longer taking the Ativan.             She  has a past medical history of Anemia, Contact lens/glasses fitting, Depression, Heartburn, PONV (postoperative nausea and vomiting), and PVD (peripheral vascular disease) (CMS/[facility]).     Objective   Vital Signs:   /78   Pulse 86   Ht 160 cm (63\")   Wt 123 kg (271 lb)   SpO2 100%   BMI 48.01 kg/m²     Physical Exam  Constitutional:       Appearance: Normal appearance.   Neck:      Thyroid: No thyroid mass, thyromegaly or thyroid tenderness.   Cardiovascular:      Rate and Rhythm: Regular rhythm.      Pulses: Normal pulses.      Heart sounds: Normal heart sounds.   Pulmonary:      Effort: Pulmonary effort is normal.      Breath sounds: Normal breath sounds.   Musculoskeletal:      Right lower leg: No edema.      Left lower leg: No edema.   Neurological:      Mental Status: She is alert and oriented to person, place, and time.   Psychiatric:         Mood and Affect: Mood normal.         Behavior: Behavior normal.        Result Review :            Current Outpatient Medications:   •  aspirin 81 MG chewable tablet, Chew 81 mg Daily., Disp: , Rfl:   •  clopidogrel (Plavix) 75 MG tablet, Plavix 75 mg oral tablet " take 1 tablet (75 mg) by oral route once daily   Active, Disp: , Rfl:   •  diphenhydrAMINE-acetaminophen (Tylenol PM Extra Strength)  MG tablet per tablet, Tylenol PM Extra Strength  mg oral tablet one po bid   Suspended, Disp: , Rfl:   •  gabapentin (NEURONTIN) 300 MG capsule, Take 1 capsule by mouth every night at bedtime., Disp: 90 capsule, Rfl: 0  •  HYDROcodone-acetaminophen (NORCO) 7.5-325 MG per tablet, Take 1 tablet by mouth Every 8 (Eight) Hours As Needed for Moderate Pain ., Disp: 90 tablet, Rfl: 0  •  PARoxetine (Paxil) 10 MG tablet, Take 1 tablet by mouth Every Morning., Disp: 90 tablet, Rfl: 1  •  pravastatin (PRAVACHOL) 80 MG tablet, Take 80 mg by mouth Daily., Disp: , Rfl:   •  vitamin D (ERGOCALCIFEROL) 1.25 MG (14440 UT) capsule capsule, Take 1 capsule by mouth 1 (One) Time Per Week., Disp: 12 capsule, Rfl: 1   Assessment and Plan    Diagnoses and all orders for this visit:    1. Encounter for screening mammogram for malignant neoplasm of breast (Primary)  -     Mammo Screening Bilateral With CAD; Future    2. Hot flashes  Comments:   c/o extreme fatigue, hot flashes and mood swings. States in the last wk her  could look at her the wrong way and she cries. Prescribed Paxil 10mg qd  Orders:  -     PARoxetine (Paxil) 10 MG tablet; Take 1 tablet by mouth Every Morning.  Dispense: 90 tablet; Refill: 1    3. Encounter for long-term current use of medication  -     POC Urine Drug Screen [facility]    4. Anxiety and depression  Comments:   anxiety and depression under good control since starting Paxil. Reports she no longer needs Ativan. States the insomnia and hot flashes have improved as well        Follow Up   Return in about 3 months (around 12/21/2021).  Patient was given instructions and counseling regarding her condition or for health maintenance advice. Please see specific information pulled into the AVS if appropriate.     Janki MEGAN Krueger  reports that she quit smoking  about 5 years ago. She has never used smokeless tobacco..            Caitlin Mohamud, APRN

## 2021-09-24 DIAGNOSIS — G89.4 CHRONIC PAIN SYNDROME: ICD-10-CM

## 2021-09-24 RX ORDER — HYDROCODONE BITARTRATE AND ACETAMINOPHEN 7.5; 325 MG/1; MG/1
1 TABLET ORAL EVERY 8 HOURS PRN
Qty: 90 TABLET | Refills: 0 | OUTPATIENT
Start: 2021-09-24

## 2021-09-24 RX ORDER — GABAPENTIN 300 MG/1
300 CAPSULE ORAL
Qty: 90 CAPSULE | Refills: 0 | OUTPATIENT
Start: 2021-09-24

## 2021-09-29 DIAGNOSIS — G89.4 CHRONIC PAIN SYNDROME: ICD-10-CM

## 2021-09-30 RX ORDER — HYDROCODONE BITARTRATE AND ACETAMINOPHEN 7.5; 325 MG/1; MG/1
1 TABLET ORAL EVERY 8 HOURS PRN
Qty: 90 TABLET | Refills: 0 | Status: SHIPPED | OUTPATIENT
Start: 2021-09-30 | End: 2021-10-28 | Stop reason: SDUPTHER

## 2021-10-17 DIAGNOSIS — G89.4 CHRONIC PAIN SYNDROME: ICD-10-CM

## 2021-10-19 RX ORDER — GABAPENTIN 300 MG/1
300 CAPSULE ORAL
Qty: 90 CAPSULE | Refills: 0 | Status: SHIPPED | OUTPATIENT
Start: 2021-10-19 | End: 2022-01-17 | Stop reason: SDUPTHER

## 2021-10-21 ENCOUNTER — TELEMEDICINE (OUTPATIENT)
Dept: FAMILY MEDICINE CLINIC | Facility: CLINIC | Age: 41
End: 2021-10-21

## 2021-10-21 VITALS — TEMPERATURE: 97.8 F | OXYGEN SATURATION: 98 % | HEART RATE: 90 BPM | RESPIRATION RATE: 18 BRPM

## 2021-10-21 DIAGNOSIS — Z20.822 EXPOSURE TO COVID-19 VIRUS: ICD-10-CM

## 2021-10-21 DIAGNOSIS — M79.10 MUSCLE ACHE: ICD-10-CM

## 2021-10-21 DIAGNOSIS — R43.2 ABNORMAL SENSE OF TASTE: ICD-10-CM

## 2021-10-21 DIAGNOSIS — R50.9 FEVER, UNSPECIFIED FEVER CAUSE: ICD-10-CM

## 2021-10-21 DIAGNOSIS — T36.95XA ANTIBIOTIC-INDUCED YEAST INFECTION: ICD-10-CM

## 2021-10-21 DIAGNOSIS — B37.9 ANTIBIOTIC-INDUCED YEAST INFECTION: ICD-10-CM

## 2021-10-21 DIAGNOSIS — J06.9 UPPER RESPIRATORY TRACT INFECTION, UNSPECIFIED TYPE: ICD-10-CM

## 2021-10-21 DIAGNOSIS — R07.89 CHEST TIGHTNESS: ICD-10-CM

## 2021-10-21 DIAGNOSIS — J02.9 SORE THROAT: Primary | ICD-10-CM

## 2021-10-21 LAB
EXPIRATION DATE: NORMAL
EXPIRATION DATE: NORMAL
FLUAV AG NPH QL: NEGATIVE
FLUBV AG NPH QL: NEGATIVE
INTERNAL CONTROL: NORMAL
INTERNAL CONTROL: NORMAL
Lab: NORMAL
Lab: NORMAL
S PYO AG THROAT QL: NEGATIVE

## 2021-10-21 PROCEDURE — 87880 STREP A ASSAY W/OPTIC: CPT | Performed by: NURSE PRACTITIONER

## 2021-10-21 PROCEDURE — 87804 INFLUENZA ASSAY W/OPTIC: CPT | Performed by: NURSE PRACTITIONER

## 2021-10-21 PROCEDURE — 87635 SARS-COV-2 COVID-19 AMP PRB: CPT | Performed by: NURSE PRACTITIONER

## 2021-10-21 PROCEDURE — 99214 OFFICE O/P EST MOD 30 MIN: CPT | Performed by: NURSE PRACTITIONER

## 2021-10-21 RX ORDER — FLUCONAZOLE 150 MG/1
150 TABLET ORAL ONCE
Qty: 1 TABLET | Refills: 0 | Status: SHIPPED | OUTPATIENT
Start: 2021-10-21 | End: 2021-10-21

## 2021-10-21 RX ORDER — DEXAMETHASONE 6 MG/1
6 TABLET ORAL
Qty: 10 TABLET | Refills: 0 | Status: SHIPPED | OUTPATIENT
Start: 2021-10-21 | End: 2021-12-21

## 2021-10-21 RX ORDER — AZITHROMYCIN 250 MG/1
TABLET, FILM COATED ORAL
Qty: 6 TABLET | Refills: 0 | Status: SHIPPED | OUTPATIENT
Start: 2021-10-21 | End: 2021-12-21

## 2021-10-21 RX ORDER — ALBUTEROL SULFATE 90 UG/1
2 AEROSOL, METERED RESPIRATORY (INHALATION) EVERY 4 HOURS PRN
Qty: 6.7 G | Refills: 0 | Status: SHIPPED | OUTPATIENT
Start: 2021-10-21 | End: 2021-12-21

## 2021-10-21 RX ORDER — BENZONATATE 100 MG/1
100 CAPSULE ORAL 3 TIMES DAILY PRN
Qty: 30 CAPSULE | Refills: 0 | Status: SHIPPED | OUTPATIENT
Start: 2021-10-21 | End: 2021-12-21

## 2021-10-21 NOTE — PROGRESS NOTES
Chief Complaint  Cough    Subjective          Janki Krueger presents to [facility]  Pt states that she hardly ever gets sick and that her symptoms are not getting any better there getting worst. Pt states that her symptoms started on Tuesday 10/19/2021 with low grade fever and than everything else started congestion in chest with tightness, cough, sore throat, sneezing, runny nose, muscle aches, headache (sinus pressure from the front to the back), fatigue and everything smells stinky (little off).    Pt is not vaccinated.    Pt strep and flu test negative.  Instructed to quarantine until she receives results of COVID test.    She  has a past medical history of Anemia, Contact lens/glasses fitting, Depression, Heartburn, PONV (postoperative nausea and vomiting), and PVD (peripheral vascular disease) (CMS/[facility]).     Objective   Vital Signs:   Pulse 90   Temp 97.8 °F (36.6 °C)   Resp 18   SpO2 98%     Physical Exam  Constitutional:       Appearance: She is ill-appearing.   HENT:      Right Ear: Hearing, ear canal and external ear normal. A middle ear effusion is present.      Left Ear: Hearing, ear canal and external ear normal. A middle ear effusion is present.      Mouth/Throat:      Lips: Pink.      Mouth: Mucous membranes are moist.      Pharynx: Oropharynx is clear. Uvula midline. Posterior oropharyngeal erythema present.      Tonsils: No tonsillar exudate or tonsillar abscesses.   Cardiovascular:      Rate and Rhythm: Normal rate and regular rhythm.      Pulses: Normal pulses.      Heart sounds: Normal heart sounds.   Pulmonary:      Effort: Pulmonary effort is normal.      Breath sounds: Normal breath sounds.   Neurological:      Mental Status: She is alert.        Result Review :          Current Outpatient Medications:   •  albuterol sulfate  (90 Base) MCG/ACT inhaler, Inhale 2 puffs Every 4 (Four) Hours As Needed for Wheezing., Disp: 6.7 g, Rfl: 0  •  aspirin  81 MG chewable tablet, Chew 81 mg Daily., Disp: , Rfl:   •  azithromycin (Zithromax Z-Christophe) 250 MG tablet, Take 2 tablets by mouth on day 1, then 1 tablet daily on days 2-5, Disp: 6 tablet, Rfl: 0  •  benzonatate (Tessalon Perles) 100 MG capsule, Take 1 capsule by mouth 3 (Three) Times a Day As Needed for Cough., Disp: 30 capsule, Rfl: 0  •  clopidogrel (Plavix) 75 MG tablet, Plavix 75 mg oral tablet take 1 tablet (75 mg) by oral route once daily   Active, Disp: , Rfl:   •  dexamethasone (DECADRON) 6 MG tablet, Take 1 tablet by mouth Daily With Breakfast., Disp: 10 tablet, Rfl: 0  •  diphenhydrAMINE-acetaminophen (Tylenol PM Extra Strength)  MG tablet per tablet, Tylenol PM Extra Strength  mg oral tablet one po bid   Suspended, Disp: , Rfl:   •  gabapentin (NEURONTIN) 300 MG capsule, Take 1 capsule by mouth every night at bedtime., Disp: 90 capsule, Rfl: 0  •  HYDROcodone-acetaminophen (NORCO) 7.5-325 MG per tablet, Take 1 tablet by mouth Every 8 (Eight) Hours As Needed for Moderate Pain ., Disp: 90 tablet, Rfl: 0  •  PARoxetine (Paxil) 10 MG tablet, Take 1 tablet by mouth Every Morning., Disp: 90 tablet, Rfl: 1  •  pravastatin (PRAVACHOL) 80 MG tablet, Take 80 mg by mouth Daily., Disp: , Rfl:   •  vitamin D (ERGOCALCIFEROL) 1.25 MG (66123 UT) capsule capsule, Take 1 capsule by mouth 1 (One) Time Per Week., Disp: 12 capsule, Rfl: 1   Assessment and Plan    Diagnoses and all orders for this visit:    1. Sore throat (Primary)  -     COVID PRE-OP / PRE-PROCEDURE SCREENING ORDER (NO ISOLATION) - Swab, Nasopharynx; Future  -     POC Rapid Strep A  -     COVID PRE-OP / PRE-PROCEDURE SCREENING ORDER (NO ISOLATION) - Swab, Nasopharynx    2. Fever, unspecified fever cause  -     COVID PRE-OP / PRE-PROCEDURE SCREENING ORDER (NO ISOLATION) - Swab, Nasopharynx; Future  -     POC Influenza A / B  -     COVID PRE-OP / PRE-PROCEDURE SCREENING ORDER (NO ISOLATION) - Swab, Nasopharynx    3. Abnormal sense of taste  -      COVID PRE-OP / PRE-PROCEDURE SCREENING ORDER (NO ISOLATION) - Swab, Nasopharynx; Future  -     COVID PRE-OP / PRE-PROCEDURE SCREENING ORDER (NO ISOLATION) - Swab, Nasopharynx    4. Chest tightness  -     COVID PRE-OP / PRE-PROCEDURE SCREENING ORDER (NO ISOLATION) - Swab, Nasopharynx; Future  -     COVID PRE-OP / PRE-PROCEDURE SCREENING ORDER (NO ISOLATION) - Swab, Nasopharynx    5. Muscle ache  -     COVID PRE-OP / PRE-PROCEDURE SCREENING ORDER (NO ISOLATION) - Swab, Nasopharynx; Future  -     POC Influenza A / B  -     COVID PRE-OP / PRE-PROCEDURE SCREENING ORDER (NO ISOLATION) - Swab, Nasopharynx    6. Upper respiratory tract infection, unspecified type  -     azithromycin (Zithromax Z-Christophe) 250 MG tablet; Take 2 tablets by mouth on day 1, then 1 tablet daily on days 2-5  Dispense: 6 tablet; Refill: 0  -     dexamethasone (DECADRON) 6 MG tablet; Take 1 tablet by mouth Daily With Breakfast.  Dispense: 10 tablet; Refill: 0  -     benzonatate (Tessalon Perles) 100 MG capsule; Take 1 capsule by mouth 3 (Three) Times a Day As Needed for Cough.  Dispense: 30 capsule; Refill: 0  -     albuterol sulfate  (90 Base) MCG/ACT inhaler; Inhale 2 puffs Every 4 (Four) Hours As Needed for Wheezing.  Dispense: 6.7 g; Refill: 0    7. Exposure to COVID-19 virus  -     azithromycin (Zithromax Z-Christophe) 250 MG tablet; Take 2 tablets by mouth on day 1, then 1 tablet daily on days 2-5  Dispense: 6 tablet; Refill: 0  -     dexamethasone (DECADRON) 6 MG tablet; Take 1 tablet by mouth Daily With Breakfast.  Dispense: 10 tablet; Refill: 0  -     benzonatate (Tessalon Perles) 100 MG capsule; Take 1 capsule by mouth 3 (Three) Times a Day As Needed for Cough.  Dispense: 30 capsule; Refill: 0  -     albuterol sulfate  (90 Base) MCG/ACT inhaler; Inhale 2 puffs Every 4 (Four) Hours As Needed for Wheezing.  Dispense: 6.7 g; Refill: 0        Follow Up   No follow-ups on file.  Patient was given instructions and counseling regarding her  condition or for health maintenance advice. Please see specific information pulled into the AVS if appropriate.     Janki MEGAN Krueger  reports that she quit smoking about 5 years ago. She has never used smokeless tobacco..             REG Guerra

## 2021-10-22 LAB — SARS-COV-2 N GENE RESP QL NAA+PROBE: NOT DETECTED

## 2021-10-28 DIAGNOSIS — G89.4 CHRONIC PAIN SYNDROME: ICD-10-CM

## 2021-10-28 RX ORDER — DEXAMETHASONE 4 MG/1
TABLET ORAL
COMMUNITY
Start: 2021-10-21 | End: 2021-12-21

## 2021-10-28 NOTE — TELEPHONE ENCOUNTER
----- Message from Janki Krueger sent at 10/28/2021  7:43 AM EDT -----  Regarding: Janki Krueger refill request  Refill request for Norco please

## 2021-10-29 RX ORDER — HYDROCODONE BITARTRATE AND ACETAMINOPHEN 7.5; 325 MG/1; MG/1
1 TABLET ORAL EVERY 8 HOURS PRN
Qty: 90 TABLET | Refills: 0 | Status: SHIPPED | OUTPATIENT
Start: 2021-10-29 | End: 2021-11-30 | Stop reason: SDUPTHER

## 2021-11-01 DIAGNOSIS — E78.00 HIGH CHOLESTEROL: Primary | ICD-10-CM

## 2021-11-01 RX ORDER — PRAVASTATIN SODIUM 80 MG/1
80 TABLET ORAL DAILY
Qty: 90 TABLET | Refills: 0 | Status: SHIPPED | OUTPATIENT
Start: 2021-11-01 | End: 2022-01-31 | Stop reason: SDUPTHER

## 2021-11-05 ENCOUNTER — TELEPHONE (OUTPATIENT)
Dept: FAMILY MEDICINE CLINIC | Facility: CLINIC | Age: 41
End: 2021-11-05

## 2021-11-06 RX ORDER — DEXTROMETHORPHAN POLISTIREX 30 MG/5ML
60 SUSPENSION ORAL EVERY 12 HOURS SCHEDULED
Qty: 280 ML | Refills: 0 | Status: SHIPPED | OUTPATIENT
Start: 2021-11-06 | End: 2021-12-21

## 2021-11-12 DIAGNOSIS — R05.9 COUGH: Primary | ICD-10-CM

## 2021-11-12 RX ORDER — PROMETHAZINE HYDROCHLORIDE AND CODEINE PHOSPHATE 6.25; 1 MG/5ML; MG/5ML
5 SOLUTION ORAL EVERY 4 HOURS PRN
Qty: 118 ML | Refills: 0 | Status: SHIPPED | OUTPATIENT
Start: 2021-11-12 | End: 2021-12-21

## 2021-11-30 DIAGNOSIS — G89.4 CHRONIC PAIN SYNDROME: ICD-10-CM

## 2021-11-30 RX ORDER — HYDROCODONE BITARTRATE AND ACETAMINOPHEN 7.5; 325 MG/1; MG/1
1 TABLET ORAL EVERY 8 HOURS PRN
Qty: 90 TABLET | Refills: 0 | Status: SHIPPED | OUTPATIENT
Start: 2021-11-30 | End: 2021-12-29 | Stop reason: SDUPTHER

## 2021-12-08 RX ORDER — CLOPIDOGREL BISULFATE 75 MG/1
TABLET ORAL
Qty: 90 TABLET | Refills: 3 | Status: SHIPPED | OUTPATIENT
Start: 2021-12-08 | End: 2022-12-02

## 2021-12-14 ENCOUNTER — HOSPITAL ENCOUNTER (OUTPATIENT)
Dept: MAMMOGRAPHY | Facility: HOSPITAL | Age: 41
Discharge: HOME | End: 2021-12-14
Admitting: NURSE PRACTITIONER

## 2021-12-14 DIAGNOSIS — Z12.31 ENCOUNTER FOR SCREENING MAMMOGRAM FOR MALIGNANT NEOPLASM OF BREAST: ICD-10-CM

## 2021-12-14 PROCEDURE — 77063 BREAST TOMOSYNTHESIS BI: CPT

## 2021-12-14 PROCEDURE — 77067 SCR MAMMO BI INCL CAD: CPT

## 2021-12-16 DIAGNOSIS — Z12.31 ENCOUNTER FOR SCREENING MAMMOGRAM FOR MALIGNANT NEOPLASM OF BREAST: Primary | ICD-10-CM

## 2021-12-21 ENCOUNTER — OFFICE VISIT (OUTPATIENT)
Dept: FAMILY MEDICINE CLINIC | Facility: CLINIC | Age: 41
End: 2021-12-21

## 2021-12-21 VITALS
OXYGEN SATURATION: 95 % | HEIGHT: 63 IN | WEIGHT: 220.2 LBS | BODY MASS INDEX: 39.02 KG/M2 | DIASTOLIC BLOOD PRESSURE: 63 MMHG | HEART RATE: 103 BPM | SYSTOLIC BLOOD PRESSURE: 119 MMHG

## 2021-12-21 DIAGNOSIS — F32.A ANXIETY AND DEPRESSION: Primary | ICD-10-CM

## 2021-12-21 DIAGNOSIS — Z00.00 MEDICARE ANNUAL WELLNESS VISIT, SUBSEQUENT: ICD-10-CM

## 2021-12-21 DIAGNOSIS — F41.9 ANXIETY AND DEPRESSION: Primary | ICD-10-CM

## 2021-12-21 PROCEDURE — 1170F FXNL STATUS ASSESSED: CPT | Performed by: NURSE PRACTITIONER

## 2021-12-21 PROCEDURE — 1159F MED LIST DOCD IN RCRD: CPT | Performed by: NURSE PRACTITIONER

## 2021-12-21 PROCEDURE — G0439 PPPS, SUBSEQ VISIT: HCPCS | Performed by: NURSE PRACTITIONER

## 2021-12-21 PROCEDURE — 99213 OFFICE O/P EST LOW 20 MIN: CPT | Performed by: NURSE PRACTITIONER

## 2021-12-21 RX ORDER — PAROXETINE HYDROCHLORIDE 20 MG/1
20 TABLET, FILM COATED ORAL EVERY MORNING
Qty: 90 TABLET | Refills: 0 | Status: SHIPPED | OUTPATIENT
Start: 2021-12-21 | End: 2022-03-24 | Stop reason: SDUPTHER
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
## 2021-12-29 DIAGNOSIS — G89.4 CHRONIC PAIN SYNDROME: ICD-10-CM

## 2021-12-30 RX ORDER — HYDROCODONE BITARTRATE AND ACETAMINOPHEN 7.5; 325 MG/1; MG/1
1 TABLET ORAL EVERY 8 HOURS PRN
Qty: 90 TABLET | Refills: 0 | Status: SHIPPED | OUTPATIENT
Start: 2021-12-30 | End: 2022-01-27 | Stop reason: SDUPTHER

## 2022-01-17 DIAGNOSIS — G89.4 CHRONIC PAIN SYNDROME: ICD-10-CM

## 2022-01-17 RX ORDER — GABAPENTIN 300 MG/1
300 CAPSULE ORAL
Qty: 90 CAPSULE | Refills: 0 | Status: SHIPPED | OUTPATIENT
Start: 2022-01-17 | End: 2022-04-18 | Stop reason: SDUPTHER

## 2022-01-19 ENCOUNTER — TELEMEDICINE (OUTPATIENT)
Dept: FAMILY MEDICINE CLINIC | Facility: CLINIC | Age: 42
End: 2022-01-19

## 2022-01-19 ENCOUNTER — HOSPITAL ENCOUNTER (OUTPATIENT)
Dept: GENERAL RADIOLOGY | Facility: HOSPITAL | Age: 42
Discharge: HOME | End: 2022-01-19
Admitting: NURSE PRACTITIONER

## 2022-01-19 ENCOUNTER — PATIENT MESSAGE (OUTPATIENT)
Dept: FAMILY MEDICINE CLINIC | Facility: CLINIC | Age: 42
End: 2022-01-19

## 2022-01-19 DIAGNOSIS — R05.9 COUGH: Primary | ICD-10-CM

## 2022-01-19 DIAGNOSIS — R05.9 COUGH: ICD-10-CM

## 2022-01-19 DIAGNOSIS — J06.9 UPPER RESPIRATORY TRACT INFECTION, UNSPECIFIED TYPE: ICD-10-CM

## 2022-01-19 PROCEDURE — 99213 OFFICE O/P EST LOW 20 MIN: CPT | Performed by: NURSE PRACTITIONER

## 2022-01-19 PROCEDURE — 71046 X-RAY EXAM CHEST 2 VIEWS: CPT

## 2022-01-19 RX ORDER — DEXTROMETHORPHAN HYDROBROMIDE AND PROMETHAZINE HYDROCHLORIDE 15; 6.25 MG/5ML; MG/5ML
5 SOLUTION ORAL 4 TIMES DAILY PRN
Qty: 118 ML | Refills: 0 | Status: SHIPPED | OUTPATIENT
Start: 2022-01-19 | End: 2022-03-17

## 2022-01-19 RX ORDER — AZITHROMYCIN 250 MG/1
TABLET, FILM COATED ORAL
Qty: 6 TABLET | Refills: 0 | Status: SHIPPED | OUTPATIENT
Start: 2022-01-19 | End: 2022-03-17

## 2022-01-19 NOTE — PROGRESS NOTES
You have chosen to receive care through a telephone visit. Do you consent to use a telephone visit for your medical care today? Yes  Chief Complaint  Cough (3-4 days) and Nasal Congestion    Subjective          Janki Krueger presents to [facility]  History of Present Illness     Report the increased dose of paxil is working well for her. She is still having a great deal of sweating but states she just had COVID and it may be related to that.     COVID-reports she is over covid but now having a lot of yellowish/greenish sinus drainage and a cough which is keeping her up at night. Denies chills, fever, h/a, sinus pressure/sinus pain. States her chest is tight at times. States she feels it when she coughs. Prescribed zithromax and phenergan DM. Ordered chest xray as well.     She is not vaccinated against COVID 19.    She  has a past medical history of Anemia, Contact lens/glasses fitting, Depression, Heartburn, PONV (postoperative nausea and vomiting), and PVD (peripheral vascular disease) ([facility]).     Objective   Vital Signs:   There were no vitals taken for this visit.    Physical Exam   Result Review :            Past Surgical History:   Procedure Laterality Date   • AORTA FEMORAL BYPASS  2014    REVISION 2016, FEM POP 2017   •  SECTION        History reviewed. No pertinent family history.     Current Outpatient Medications:   •  aspirin 81 MG chewable tablet, Chew 81 mg Daily., Disp: , Rfl:   •  clopidogrel (PLAVIX) 75 MG tablet, TAKE ONE TABLET BY MOUTH EVERY DAY, Disp: 90 tablet, Rfl: 3  •  diphenhydrAMINE-acetaminophen (Tylenol PM Extra Strength)  MG tablet per tablet, Tylenol PM Extra Strength  mg oral tablet one po bid   Suspended, Disp: , Rfl:   •  gabapentin (NEURONTIN) 300 MG capsule, Take 1 capsule by mouth every night at bedtime., Disp: 90 capsule, Rfl: 0  •  HYDROcodone-acetaminophen (NORCO) 7.5-325 MG per tablet, Take 1 tablet by mouth  Every 8 (Eight) Hours As Needed for Moderate Pain ., Disp: 90 tablet, Rfl: 0  •  PARoxetine (Paxil) 20 MG tablet, Take 1 tablet by mouth Every Morning., Disp: 90 tablet, Rfl: 0  •  pravastatin (PRAVACHOL) 80 MG tablet, Take 1 tablet by mouth Daily., Disp: 90 tablet, Rfl: 0  •  vitamin D (ERGOCALCIFEROL) 1.25 MG (27041 UT) capsule capsule, Take 1 capsule by mouth 1 (One) Time Per Week., Disp: 12 capsule, Rfl: 1  •  azithromycin (Zithromax Z-Christophe) 250 MG tablet, Take 2 tablets by mouth on day 1, then 1 tablet daily on days 2-5, Disp: 6 tablet, Rfl: 0  •  promethazine-dextromethorphan (PROMETHAZINE-DM) 6.25-15 MG/5ML solution, Take 5 mL by mouth 4 (Four) Times a Day As Needed for Cough., Disp: 118 mL, Rfl: 0   Assessment and Plan    Diagnoses and all orders for this visit:    1. Cough (Primary)  -     promethazine-dextromethorphan (PROMETHAZINE-DM) 6.25-15 MG/5ML solution; Take 5 mL by mouth 4 (Four) Times a Day As Needed for Cough.  Dispense: 118 mL; Refill: 0  -     XR Chest PA & Lateral; Future    2. Upper respiratory tract infection, unspecified type  Comments:   Prescribed zithromax and phenergan DM. Ordered chest xray as well.   Orders:  -     azithromycin (Zithromax Z-Christophe) 250 MG tablet; Take 2 tablets by mouth on day 1, then 1 tablet daily on days 2-5  Dispense: 6 tablet; Refill: 0        Follow Up   Return if symptoms worsen or fail to improve.  Patient was given instructions and counseling regarding her condition or for health maintenance advice. Please see specific information pulled into the AVS if appropriate.     Janki Krueger  reports that she quit smoking about 6 years ago. She has never used smokeless tobacco..            Caitlin Mohamud, APRN

## 2022-01-27 DIAGNOSIS — G89.4 CHRONIC PAIN SYNDROME: ICD-10-CM

## 2022-01-28 RX ORDER — HYDROCODONE BITARTRATE AND ACETAMINOPHEN 7.5; 325 MG/1; MG/1
1 TABLET ORAL EVERY 8 HOURS PRN
Qty: 90 TABLET | Refills: 0 | Status: SHIPPED | OUTPATIENT
Start: 2022-01-28 | End: 2022-02-28 | Stop reason: SDUPTHER

## 2022-01-31 ENCOUNTER — HOSPITAL ENCOUNTER (OUTPATIENT)
Dept: CARDIOLOGY | Facility: HOSPITAL | Age: 42
Discharge: HOME | End: 2022-01-31

## 2022-01-31 ENCOUNTER — OFFICE VISIT (OUTPATIENT)
Dept: VASCULAR SURGERY | Facility: HOSPITAL | Age: 42
End: 2022-01-31

## 2022-01-31 VITALS
HEART RATE: 93 BPM | OXYGEN SATURATION: 97 % | RESPIRATION RATE: 18 BRPM | TEMPERATURE: 98.3 F | SYSTOLIC BLOOD PRESSURE: 137 MMHG | DIASTOLIC BLOOD PRESSURE: 87 MMHG

## 2022-01-31 DIAGNOSIS — I73.9 PAD (PERIPHERAL ARTERY DISEASE): ICD-10-CM

## 2022-01-31 DIAGNOSIS — I73.9 CLAUDICATION: ICD-10-CM

## 2022-01-31 DIAGNOSIS — I70.213 ATHEROSCLEROSIS OF NATIVE ARTERIES OF EXTREMITIES WITH INTERMITTENT CLAUDICATION, BILATERAL LEGS: ICD-10-CM

## 2022-01-31 DIAGNOSIS — E78.00 HIGH CHOLESTEROL: ICD-10-CM

## 2022-01-31 DIAGNOSIS — I70.219 ATHEROSCLEROSIS OF LOWER EXTREMITY WITH CLAUDICATION: Primary | ICD-10-CM

## 2022-01-31 LAB
BH CV LOWER ARTERIAL LEFT ABI RATIO: 0.86
BH CV LOWER ARTERIAL LEFT DORSALIS PEDIS SYS MAX: 118 MMHG
BH CV LOWER ARTERIAL LEFT GREAT TOE SYS MAX: 88 MMHG
BH CV LOWER ARTERIAL LEFT LOW THIGH SYS MAX: 122 MMHG
BH CV LOWER ARTERIAL LEFT POPLITEAL SYS MAX: 125 MMHG
BH CV LOWER ARTERIAL LEFT POST TIBIAL SYS MAX: 113 MMHG
BH CV LOWER ARTERIAL LEFT TBI RATIO: 0.64
BH CV LOWER ARTERIAL RIGHT ABI RATIO: 1.03
BH CV LOWER ARTERIAL RIGHT DORSALIS PEDIS SYS MAX: 141 MMHG
BH CV LOWER ARTERIAL RIGHT GREAT TOE SYS MAX: 64 MMHG
BH CV LOWER ARTERIAL RIGHT LOW THIGH SYS MAX: 124 MMHG
BH CV LOWER ARTERIAL RIGHT POPLITEAL SYS MAX: 148 MMHG
BH CV LOWER ARTERIAL RIGHT POST TIBIAL SYS MAX: 140 MMHG
BH CV LOWER ARTERIAL RIGHT TBI RATIO: 0.47
MAXIMAL PREDICTED HEART RATE: 177 BPM
STRESS TARGET HR: 150 BPM
UPPER ARTERIAL LEFT ARM BRACHIAL SYS MAX: 137 MMHG
UPPER ARTERIAL RIGHT ARM BRACHIAL SYS MAX: 136 MMHG

## 2022-01-31 PROCEDURE — G0463 HOSPITAL OUTPT CLINIC VISIT: HCPCS | Performed by: SURGERY

## 2022-01-31 PROCEDURE — 93923 UPR/LXTR ART STDY 3+ LVLS: CPT

## 2022-01-31 PROCEDURE — 99212 OFFICE O/P EST SF 10 MIN: CPT | Performed by: SURGERY

## 2022-01-31 PROCEDURE — 93923 UPR/LXTR ART STDY 3+ LVLS: CPT | Performed by: SURGERY

## 2022-01-31 RX ORDER — PRAVASTATIN SODIUM 80 MG/1
80 TABLET ORAL DAILY
Qty: 90 TABLET | Refills: 1 | Status: SHIPPED | OUTPATIENT
Start: 2022-01-31 | End: 2022-06-30 | Stop reason: SDUPTHER

## 2022-01-31 NOTE — PROGRESS NOTES
[facility]   Follow up Office    Patient Name: Janki Krueger  : 1980  MRN: [id number]  Primary Care Physician:  Caitlin Mohamud APRN      Subjective   Subjective     HPI:    Janki Krueger is a 41 y.o. female with a previous history of an aortobifemoral bypass graft now occluded.  She has been followed with nonlifestyle limiting claudication.  She indicates that she is doing the same.  No new complaints.      Objective     Vitals:   Temp:  [98.3 °F (36.8 °C)] 98.3 °F (36.8 °C)  Heart Rate:  [93] 93  Resp:  [18] 18  BP: (136-137)/(85-87) 137/87    Physical Exam      General: Alert, no acute distress.  Extremities: Symmetric.    Diagnostic studies: An arterial Doppler in the office today demonstrates ABIs of 1 on the right, 0.86 on the left.  This compares to the previous study dated 2021 when her ABIs were 0.98 on the right, 0.9 on the left.    Assessment/Plan   Assessment / Plan     Diagnoses and all orders for this visit:    1. Atherosclerosis of lower extremity with claudication (HCC) (Primary)  -     Doppler Arterial Multi Level Lower Extremity - Bilateral CAR; Future    2. Atherosclerosis of native arteries of extremities with intermittent claudication, bilateral legs (HCC)   -     Doppler Arterial Multi Level Lower Extremity - Bilateral CAR; Future       Assessment/Plan:   Navya remains very stable both by symptoms and noninvasive studies.  She continues on aspirin and Plavix.  At this time the plan is for follow-up with repeat noninvasive studies in 6 months, sooner if needed.        Electronically signed by Jonah Castañeda MD, 22, 11:01 AM EST.

## 2022-02-16 RX ORDER — ERGOCALCIFEROL 1.25 MG/1
50000 CAPSULE ORAL WEEKLY
Qty: 12 CAPSULE | Refills: 1 | Status: SHIPPED | OUTPATIENT
Start: 2022-02-16 | End: 2022-08-10

## 2022-02-28 DIAGNOSIS — G89.4 CHRONIC PAIN SYNDROME: ICD-10-CM

## 2022-03-01 RX ORDER — HYDROCODONE BITARTRATE AND ACETAMINOPHEN 7.5; 325 MG/1; MG/1
1 TABLET ORAL EVERY 8 HOURS PRN
Qty: 90 TABLET | Refills: 0 | Status: SHIPPED | OUTPATIENT
Start: 2022-03-01 | End: 2022-04-27 | Stop reason: SDUPTHER

## 2022-03-17 ENCOUNTER — OFFICE VISIT (OUTPATIENT)
Dept: OBSTETRICS AND GYNECOLOGY | Facility: CLINIC | Age: 42
End: 2022-03-17

## 2022-03-17 VITALS
SYSTOLIC BLOOD PRESSURE: 135 MMHG | HEART RATE: 89 BPM | BODY MASS INDEX: 38.62 KG/M2 | WEIGHT: 218 LBS | DIASTOLIC BLOOD PRESSURE: 87 MMHG | HEIGHT: 63 IN

## 2022-03-17 DIAGNOSIS — R82.90 BAD ODOR OF URINE: Primary | ICD-10-CM

## 2022-03-17 DIAGNOSIS — Z01.419 WOMEN'S ANNUAL ROUTINE GYNECOLOGICAL EXAMINATION: ICD-10-CM

## 2022-03-17 LAB
BILIRUB BLD-MCNC: NEGATIVE MG/DL
CLARITY, POC: CLEAR
COLOR UR: YELLOW
GLUCOSE UR STRIP-MCNC: NEGATIVE MG/DL
KETONES UR QL: NEGATIVE
LEUKOCYTE EST, POC: NEGATIVE
NITRITE UR-MCNC: NEGATIVE MG/ML
PH UR: 6 [PH] (ref 5–8)
PROT UR STRIP-MCNC: NEGATIVE MG/DL
RBC # UR STRIP: NEGATIVE /UL
UROBILINOGEN UR QL: NORMAL

## 2022-03-17 PROCEDURE — 87624 HPV HI-RISK TYP POOLED RSLT: CPT | Performed by: NURSE PRACTITIONER

## 2022-03-17 PROCEDURE — 81002 URINALYSIS NONAUTO W/O SCOPE: CPT | Performed by: NURSE PRACTITIONER

## 2022-03-17 PROCEDURE — G0101 CA SCREEN;PELVIC/BREAST EXAM: HCPCS | Performed by: NURSE PRACTITIONER

## 2022-03-17 PROCEDURE — G0123 SCREEN CERV/VAG THIN LAYER: HCPCS | Performed by: NURSE PRACTITIONER

## 2022-03-17 NOTE — PROGRESS NOTES
GYN Annual Exam     CC - Here for annual exam.        HPI  Janki Krueger is a 41 y.o. female, No obstetric history on file., who presents for annual well woman exam. No LMP recorded. Patient has had an ablation..  Periods are absent .  Dysmenorrhea:none.  Patient reports problems with: none. There were no changes to her medical or surgical history since her last visit.. Partner Status: Marital Status: .  New Partners since last visit: no.  Desires STD Screening: no.    Additional OB/GYN History   Current contraception: contraceptive methods: Vasectomy   Desires to: continue contraception  Last Pap :   Last Completed Pap Smear     This patient has no relevant Health Maintenance data.        History of abnormal Pap smear: no  Family history of uterine, colon, breast, or ovarian cancer: no  Last mammogram:   Last Completed Mammogram     This patient has no relevant Health Maintenance data.         Exercises Regularly: yes  Feelings of Anxiety or Depression: yes - controlled with Paxil  Tobacco Usage?: No   OB History    No obstetric history on file.         Health Maintenance   Topic Date Due   • Annual Gynecologic Pelvic and Breast Exam  Never done   • COVID-19 Vaccine (1) Never done   • TDAP/TD VACCINES (1 - Tdap) Never done   • HEPATITIS C SCREENING  Never done   • PAP SMEAR  10/01/2021   • INFLUENZA VACCINE  12/21/2022 (Originally 8/1/2021)   • LIPID PANEL  08/24/2022   • ANNUAL WELLNESS VISIT  12/21/2022   • Pneumococcal Vaccine 0-64  Aged Out       The additional following portions of the patient's history were reviewed and updated as appropriate: allergies, current medications, past family history, past medical history, past social history, past surgical history and problem list.    Review of Systems   Constitutional: Negative.    HENT: Negative.    Eyes: Negative.    Respiratory: Negative for cough and shortness of breath.    Cardiovascular: Negative for chest pain and leg swelling.  "  Gastrointestinal: Negative for abdominal pain.   Endocrine: Negative.    Genitourinary: Negative for breast discharge, breast lump, breast pain, difficulty urinating, dyspareunia, dysuria, menstrual problem, pelvic pain and vaginal discharge.   Musculoskeletal: Negative.    Skin: Negative.    Allergic/Immunologic:        No new allergies.   Neurological: Negative.    Hematological: Negative.    Psychiatric/Behavioral: Negative for depressed mood. The patient is not nervous/anxious.          I have reviewed and agree with the HPI, ROS, and historical information as entered above. Yassine Forbes, APRN    Objective   /87 (BP Location: Right arm, Patient Position: Sitting, Cuff Size: Adult)   Pulse 89   Ht 160 cm (62.99\")   Wt 98.9 kg (218 lb)   Breastfeeding No   BMI 38.63 kg/m²     Physical Exam  Vitals and nursing note reviewed. Exam conducted with a chaperone present.   Constitutional:       Appearance: Normal appearance. She is well-developed and well-groomed.   HENT:      Head: Normocephalic.   Eyes:      Pupils: Pupils are equal, round, and reactive to light.   Neck:      Thyroid: No thyroid mass or thyromegaly.   Cardiovascular:      Rate and Rhythm: Normal rate and regular rhythm.      Heart sounds: Normal heart sounds.   Pulmonary:      Effort: Pulmonary effort is normal.      Breath sounds: Normal breath sounds.   Chest:   Breasts:      Right: Normal. No inverted nipple, mass, nipple discharge or skin change.      Left: Normal. No inverted nipple, mass, nipple discharge or skin change.       Abdominal:      General: Abdomen is flat. Bowel sounds are normal.      Palpations: Abdomen is soft.      Tenderness: There is no abdominal tenderness.   Genitourinary:     General: Normal vulva.      Exam position: Lithotomy position.      Pubic Area: No rash or pubic lice.       Vagina: Normal.      Cervix: No cervical motion tenderness.      Uterus: Normal.       Adnexa: Right adnexa normal and left " adnexa normal.        Right: No mass, tenderness or fullness.          Left: No mass, tenderness or fullness.        Rectum: Normal.   Musculoskeletal:      Cervical back: Normal range of motion and neck supple.   Skin:     General: Skin is warm and dry.   Neurological:      General: No focal deficit present.      Mental Status: She is alert.   Psychiatric:         Attention and Perception: Attention and perception normal.         Mood and Affect: Mood and affect normal.         Speech: Speech normal.         Behavior: Behavior normal. Behavior is cooperative.         Cognition and Memory: Cognition normal.         Judgment: Judgment normal.            Assessment and Plan    Problem List Items Addressed This Visit    None     Visit Diagnoses     Bad odor of urine    -  Primary    Relevant Orders    POC Urinalysis Dipstick (Completed)    Women's annual routine gynecological examination        Relevant Orders    IgP, Aptima HPV          1. GYN annual well woman exam.   2. Already had mammogram  3. Reviewed monthly self breast exams.  Instructed to call with lumps, pain, or breast discharge.    4. Reviewed exercise as a preventative health measures.   5. Reccommended Flu Vaccine in Fall of each year.  6. RTC in 1 year or PRN with problems.    Yassine Forbes, APRN  03/17/2022

## 2022-03-24 DIAGNOSIS — F41.9 ANXIETY AND DEPRESSION: ICD-10-CM

## 2022-03-24 DIAGNOSIS — F32.A ANXIETY AND DEPRESSION: ICD-10-CM

## 2022-03-24 LAB
CYTOLOGIST CVX/VAG CYTO: NORMAL
CYTOLOGY CVX/VAG DOC CYTO: NORMAL
CYTOLOGY CVX/VAG DOC THIN PREP: NORMAL
DX ICD CODE: NORMAL
HIV 1 & 2 AB SER-IMP: NORMAL
HPV I/H RISK 4 DNA CVX QL PROBE+SIG AMP: NEGATIVE
Lab: NORMAL
OTHER STN SPEC: NORMAL
RECOM F/U CVX/VAG CYTO: NORMAL
STAT OF ADQ CVX/VAG CYTO-IMP: NORMAL

## 2022-03-24 RX ORDER — PAROXETINE HYDROCHLORIDE 20 MG/1
20 TABLET, FILM COATED ORAL EVERY MORNING
Qty: 90 TABLET | Refills: 0 | Status: SHIPPED | OUTPATIENT
Start: 2022-03-24 | End: 2022-06-20 | Stop reason: SDUPTHER

## 2022-03-28 ENCOUNTER — TELEPHONE (OUTPATIENT)
Dept: OBSTETRICS AND GYNECOLOGY | Facility: CLINIC | Age: 42
End: 2022-03-28

## 2022-03-28 NOTE — TELEPHONE ENCOUNTER
----- Message from REG Vergara sent at 3/25/2022  8:41 AM EDT -----  Please call patient and have her come in for pap recollect.  Her specimen was unsatisfactory for evaluation due to not enough cells.

## 2022-03-29 ENCOUNTER — OFFICE VISIT (OUTPATIENT)
Dept: FAMILY MEDICINE CLINIC | Facility: CLINIC | Age: 42
End: 2022-03-29

## 2022-03-29 VITALS
HEIGHT: 63 IN | HEART RATE: 88 BPM | WEIGHT: 218.4 LBS | SYSTOLIC BLOOD PRESSURE: 135 MMHG | OXYGEN SATURATION: 98 % | BODY MASS INDEX: 38.7 KG/M2 | DIASTOLIC BLOOD PRESSURE: 78 MMHG

## 2022-03-29 DIAGNOSIS — M79.2 NEUROPATHIC PAIN OF FOOT, RIGHT: ICD-10-CM

## 2022-03-29 DIAGNOSIS — K59.03 DRUG-INDUCED CONSTIPATION: ICD-10-CM

## 2022-03-29 DIAGNOSIS — Z79.899 LONG TERM USE OF DRUG: Primary | ICD-10-CM

## 2022-03-29 PROCEDURE — 99213 OFFICE O/P EST LOW 20 MIN: CPT | Performed by: NURSE PRACTITIONER

## 2022-03-29 PROCEDURE — 80305 DRUG TEST PRSMV DIR OPT OBS: CPT | Performed by: NURSE PRACTITIONER

## 2022-03-29 NOTE — PROGRESS NOTES
Answers for HPI/ROS submitted by the patient on 3/22/2022  Please describe your symptoms.: Follow up appt  Have you had these symptoms before?: No  How long have you been having these symptoms?: Greater than 2 weeks  Please describe any probable cause for these symptoms. : Just 3 month checkup  What is the primary reason for your visit?: Other    Chief Complaint  Constipation and Peripheral Neuropathy    Subjective          Janki Krueger presents to [facility]  History of Present Illness  Pt states that she has been having a lot constipation and has been going at least once week.  Pt last bowel movement was last night. Pt thought it was from taking Norco which she hasn't taking in the last 2 weeks. States she has tried colace, mirilax, laxative w/o results. States she is going about once wk but normally she would go every day.     Neuropathy -she is taking the neurontin for her neuropathy symptoms that started after plaque broke loose in her groin-she was told initially she was going to have the great toe amputated-she is seeing -vascular. States  did a couple surgeries then  did a couple. States she was told she still has some blockage but not to the degree she needs surgery. She sees  every 6m. She was prescribed the Norco for the b/l leg pain mostly right leg-states it is from the hips to the tips of her toes. States she has not had to take a pain med for 2 wks. States she is hoping to stay off the pain medication but would like to have it if needed. States she still has about 30 tabs left.     She had a pap 2 wks ago-but has to have a repeat due to insufficient cells.       She  has a past medical history of Anemia, Anxiety, Contact lens/glasses fitting, Depression, Heartburn, Obesity, PONV (postoperative nausea and vomiting), and PVD (peripheral vascular disease) ([facility]).     Objective   Vital Signs:   /78 (BP Location: Left arm, Patient  "Position: Sitting, Cuff Size: Large Adult)   Pulse 88   Ht 160 cm (62.99\")   Wt 99.1 kg (218 lb 6.4 oz)   SpO2 98%   BMI 38.70 kg/m²     Physical Exam  Constitutional:       Appearance: Normal appearance.   Neck:      Thyroid: No thyroid mass, thyromegaly or thyroid tenderness.      Vascular: No carotid bruit.   Cardiovascular:      Rate and Rhythm: Normal rate and regular rhythm.      Pulses: Normal pulses.      Heart sounds: Normal heart sounds.   Pulmonary:      Effort: Pulmonary effort is normal.      Breath sounds: Normal breath sounds.   Musculoskeletal:      Right lower leg: No edema.      Left lower leg: No edema.   Skin:     General: Skin is warm and dry.   Neurological:      General: No focal deficit present.      Mental Status: She is alert and oriented to person, place, and time.   Psychiatric:         Mood and Affect: Mood normal.         Behavior: Behavior normal.        Result Review :   The following data was reviewed by: REG Guerra on 03/29/2022:  CMP    CMP 8/24/21   Glucose 83   BUN 7   Creatinine 0.67   eGFR Non African Am 97   Sodium 140   Potassium 4.2   Chloride 107   Calcium 9.0   Albumin 4.40   Total Bilirubin 0.2   Alkaline Phosphatase 70   AST (SGOT) 25   ALT (SGPT) 34 (A)   (A) Abnormal value            CBC    CBC 8/24/21   WBC 5.28   RBC 4.81   Hemoglobin 14.6   Hematocrit 42.8   MCV 89.0   MCH 30.4   MCHC 34.1   RDW 12.3   Platelets 236           CBC w/diff    CBC w/Diff 8/24/21   WBC 5.28   RBC 4.81   Hemoglobin 14.6   Hematocrit 42.8   MCV 89.0   MCH 30.4   MCHC 34.1   RDW 12.3   Platelets 236   Neutrophil Rel % 57.5   Immature Granulocyte Rel % 0.4   Lymphocyte Rel % 32.2   Monocyte Rel % 7.8   Eosinophil Rel % 1.3   Basophil Rel % 0.8           Lipid Panel    Lipid Panel 8/24/21   Total Cholesterol 150   Triglycerides 95   HDL Cholesterol 40   VLDL Cholesterol 18   LDL Cholesterol  92   LDL/HDL Ratio 2.28           TSH    TSH 8/24/21   TSH 2.060               "      Past Surgical History:   Procedure Laterality Date   • AORTA FEMORAL BYPASS  2014    REVISION NOVE 2016, FEM POP 2017   •  SECTION     • ENDOMETRIAL ABLATION  16      Family History   Problem Relation Age of Onset   • COPD Father    • Diabetes Father    • Vision loss Maternal Grandmother    • Cancer Paternal Grandmother         Current Outpatient Medications:   •  aspirin 81 MG chewable tablet, Chew 81 mg Daily., Disp: , Rfl:   •  clopidogrel (PLAVIX) 75 MG tablet, TAKE ONE TABLET BY MOUTH EVERY DAY, Disp: 90 tablet, Rfl: 3  •  diphenhydrAMINE-acetaminophen (Tylenol PM Extra Strength)  MG tablet per tablet, Tylenol PM Extra Strength  mg oral tablet one po bid   Suspended, Disp: , Rfl:   •  gabapentin (NEURONTIN) 300 MG capsule, Take 1 capsule by mouth every night at bedtime., Disp: 90 capsule, Rfl: 0  •  HYDROcodone-acetaminophen (NORCO) 7.5-325 MG per tablet, Take 1 tablet by mouth Every 8 (Eight) Hours As Needed for Moderate Pain ., Disp: 90 tablet, Rfl: 0  •  PARoxetine (Paxil) 20 MG tablet, Take 1 tablet by mouth Every Morning., Disp: 90 tablet, Rfl: 0  •  pravastatin (PRAVACHOL) 80 MG tablet, Take 1 tablet by mouth Daily., Disp: 90 tablet, Rfl: 1  •  vitamin D (ERGOCALCIFEROL) 1.25 MG (72757 UT) capsule capsule, Take 1 capsule by mouth 1 (One) Time Per Week., Disp: 12 capsule, Rfl: 1   Assessment and Plan    Diagnoses and all orders for this visit:    1. Long term use of drug (Primary)  -     POC Urine Drug Screen [facility]    2. Neuropathic pain of foot, right  Comments:  she is taking neurontin for the neuropathy-she has not had to use the Norco in 2 wks. Erik, rashmis and controlled consent updated today    3. Drug-induced constipation  Comments:  c/o constipation from Norco-she has been off the medication x 2 wks-she is hoping it resolves after it is completely out of her system.        Follow Up   Return in about 3 months (around 2022).  Patient was given  instructions and counseling regarding her condition or for health maintenance advice. Please see specific information pulled into the AVS if appropriate.     Janki GUZMAN Pati  reports that she quit smoking about 6 years ago. She has never used smokeless tobacco.            REG Guerra    The patient is advised to continue current medications.

## 2022-04-18 DIAGNOSIS — G89.4 CHRONIC PAIN SYNDROME: ICD-10-CM

## 2022-04-18 RX ORDER — GABAPENTIN 300 MG/1
300 CAPSULE ORAL
Qty: 90 CAPSULE | Refills: 0 | Status: SHIPPED | OUTPATIENT
Start: 2022-04-18 | End: 2022-07-16

## 2022-04-27 DIAGNOSIS — G89.4 CHRONIC PAIN SYNDROME: ICD-10-CM

## 2022-04-28 RX ORDER — HYDROCODONE BITARTRATE AND ACETAMINOPHEN 7.5; 325 MG/1; MG/1
1 TABLET ORAL EVERY 8 HOURS PRN
Qty: 60 TABLET | Refills: 0 | Status: SHIPPED | OUTPATIENT
Start: 2022-04-28 | End: 2022-06-30

## 2022-05-12 ENCOUNTER — TELEPHONE (OUTPATIENT)
Dept: FAMILY MEDICINE CLINIC | Facility: CLINIC | Age: 42
End: 2022-05-12

## 2022-05-12 DIAGNOSIS — Z79.899 MEDICATION MANAGEMENT: Primary | ICD-10-CM

## 2022-05-12 NOTE — TELEPHONE ENCOUNTER
Phoned patient and advised that Caitlin ( Dr. Masterson)  will not be filling her pain medication we will be placing a referral for her to see pain management. The patient states she is fine with this. Order placed.

## 2022-06-20 DIAGNOSIS — F32.A ANXIETY AND DEPRESSION: ICD-10-CM

## 2022-06-20 DIAGNOSIS — F41.9 ANXIETY AND DEPRESSION: ICD-10-CM

## 2022-06-20 RX ORDER — PAROXETINE HYDROCHLORIDE 20 MG/1
20 TABLET, FILM COATED ORAL EVERY MORNING
Qty: 90 TABLET | Refills: 0 | Status: SHIPPED | OUTPATIENT
Start: 2022-06-20 | End: 2022-09-19

## 2022-06-30 ENCOUNTER — OFFICE VISIT (OUTPATIENT)
Dept: FAMILY MEDICINE CLINIC | Facility: CLINIC | Age: 42
End: 2022-06-30

## 2022-06-30 VITALS
SYSTOLIC BLOOD PRESSURE: 122 MMHG | HEIGHT: 63 IN | WEIGHT: 221 LBS | HEART RATE: 91 BPM | BODY MASS INDEX: 39.16 KG/M2 | DIASTOLIC BLOOD PRESSURE: 69 MMHG | OXYGEN SATURATION: 97 %

## 2022-06-30 DIAGNOSIS — E78.00 HIGH CHOLESTEROL: ICD-10-CM

## 2022-06-30 DIAGNOSIS — M25.559 HIP PAIN: Primary | ICD-10-CM

## 2022-06-30 DIAGNOSIS — G89.4 CHRONIC PAIN SYNDROME: ICD-10-CM

## 2022-06-30 DIAGNOSIS — B37.9 CANDIDA INFECTION: ICD-10-CM

## 2022-06-30 PROCEDURE — 99213 OFFICE O/P EST LOW 20 MIN: CPT | Performed by: NURSE PRACTITIONER

## 2022-06-30 PROCEDURE — 96372 THER/PROPH/DIAG INJ SC/IM: CPT | Performed by: NURSE PRACTITIONER

## 2022-06-30 RX ORDER — PRAVASTATIN SODIUM 80 MG/1
80 TABLET ORAL DAILY
Qty: 90 TABLET | Refills: 1 | Status: SHIPPED | OUTPATIENT
Start: 2022-06-30 | End: 2023-01-16

## 2022-06-30 RX ORDER — HYDROCODONE BITARTRATE AND ACETAMINOPHEN 7.5; 325 MG/1; MG/1
1 TABLET ORAL EVERY 8 HOURS PRN
Qty: 90 TABLET | Refills: 0 | Status: SHIPPED | OUTPATIENT
Start: 2022-06-30

## 2022-06-30 RX ORDER — KETOROLAC TROMETHAMINE 30 MG/ML
60 INJECTION, SOLUTION INTRAMUSCULAR; INTRAVENOUS ONCE
Status: COMPLETED | OUTPATIENT
Start: 2022-06-30 | End: 2022-06-30

## 2022-06-30 RX ORDER — TRIAMCINOLONE ACETONIDE 40 MG/ML
60 INJECTION, SUSPENSION INTRA-ARTICULAR; INTRAMUSCULAR ONCE
Status: COMPLETED | OUTPATIENT
Start: 2022-06-30 | End: 2022-06-30

## 2022-06-30 RX ORDER — FLUCONAZOLE 150 MG/1
150 TABLET ORAL ONCE
Qty: 1 TABLET | Refills: 0 | Status: SHIPPED | OUTPATIENT
Start: 2022-06-30 | End: 2022-06-30

## 2022-06-30 RX ADMIN — TRIAMCINOLONE ACETONIDE 60 MG: 40 INJECTION, SUSPENSION INTRA-ARTICULAR; INTRAMUSCULAR at 12:06

## 2022-06-30 RX ADMIN — KETOROLAC TROMETHAMINE 60 MG: 30 INJECTION, SOLUTION INTRAMUSCULAR; INTRAVENOUS at 12:05

## 2022-07-11 DIAGNOSIS — G89.4 CHRONIC PAIN SYNDROME: ICD-10-CM

## 2022-07-11 RX ORDER — GABAPENTIN 300 MG/1
300 CAPSULE ORAL
Qty: 90 CAPSULE | Refills: 0 | OUTPATIENT
Start: 2022-07-11

## 2022-07-15 DIAGNOSIS — G89.4 CHRONIC PAIN SYNDROME: ICD-10-CM

## 2022-07-16 RX ORDER — GABAPENTIN 300 MG/1
CAPSULE ORAL
Qty: 90 CAPSULE | Refills: 0 | Status: SHIPPED | OUTPATIENT
Start: 2022-07-16 | End: 2022-10-17

## 2022-08-05 VITALS
RESPIRATION RATE: 18 BRPM | WEIGHT: 222.66 LBS | HEART RATE: 94 BPM | BODY MASS INDEX: 39.45 KG/M2 | HEIGHT: 63 IN | TEMPERATURE: 98.1 F | SYSTOLIC BLOOD PRESSURE: 154 MMHG | OXYGEN SATURATION: 100 % | DIASTOLIC BLOOD PRESSURE: 77 MMHG

## 2022-08-05 PROCEDURE — 99283 EMERGENCY DEPT VISIT LOW MDM: CPT

## 2022-08-06 ENCOUNTER — APPOINTMENT (OUTPATIENT)
Dept: CT IMAGING | Facility: HOSPITAL | Age: 42
End: 2022-08-06

## 2022-08-06 ENCOUNTER — HOSPITAL ENCOUNTER (EMERGENCY)
Facility: HOSPITAL | Age: 42
Discharge: HOME | End: 2022-08-06
Attending: EMERGENCY MEDICINE | Admitting: EMERGENCY MEDICINE

## 2022-08-06 DIAGNOSIS — G43.901 MIGRAINE WITH STATUS MIGRAINOSUS, NOT INTRACTABLE, UNSPECIFIED MIGRAINE TYPE: Primary | ICD-10-CM

## 2022-08-06 PROCEDURE — 96375 TX/PRO/DX INJ NEW DRUG ADDON: CPT

## 2022-08-06 PROCEDURE — 70450 CT HEAD/BRAIN W/O DYE: CPT

## 2022-08-06 PROCEDURE — 25010000002 DIPHENHYDRAMINE PER 50 MG: Performed by: NURSE PRACTITIONER

## 2022-08-06 PROCEDURE — 25010000002 METOCLOPRAMIDE PER 10 MG: Performed by: NURSE PRACTITIONER

## 2022-08-06 PROCEDURE — 25010000002 KETOROLAC TROMETHAMINE PER 15 MG: Performed by: NURSE PRACTITIONER

## 2022-08-06 PROCEDURE — 96374 THER/PROPH/DIAG INJ IV PUSH: CPT

## 2022-08-06 RX ORDER — METOCLOPRAMIDE HYDROCHLORIDE 5 MG/ML
10 INJECTION INTRAMUSCULAR; INTRAVENOUS ONCE
Status: COMPLETED | OUTPATIENT
Start: 2022-08-06 | End: 2022-08-06

## 2022-08-06 RX ORDER — KETOROLAC TROMETHAMINE 30 MG/ML
30 INJECTION, SOLUTION INTRAMUSCULAR; INTRAVENOUS ONCE
Status: COMPLETED | OUTPATIENT
Start: 2022-08-06 | End: 2022-08-06

## 2022-08-06 RX ORDER — DIPHENHYDRAMINE HYDROCHLORIDE 50 MG/ML
12.5 INJECTION INTRAMUSCULAR; INTRAVENOUS ONCE
Status: COMPLETED | OUTPATIENT
Start: 2022-08-06 | End: 2022-08-06

## 2022-08-06 RX ORDER — ACETAMINOPHEN 500 MG
1000 TABLET ORAL ONCE
Status: COMPLETED | OUTPATIENT
Start: 2022-08-06 | End: 2022-08-06

## 2022-08-06 RX ADMIN — SODIUM CHLORIDE 1000 ML: 9 INJECTION, SOLUTION INTRAVENOUS at 01:06

## 2022-08-06 RX ADMIN — ACETAMINOPHEN 1000 MG: 500 TABLET ORAL at 01:06

## 2022-08-06 RX ADMIN — KETOROLAC TROMETHAMINE 30 MG: 30 INJECTION, SOLUTION INTRAMUSCULAR; INTRAVENOUS at 01:06

## 2022-08-06 RX ADMIN — METOCLOPRAMIDE HYDROCHLORIDE 10 MG: 5 INJECTION INTRAMUSCULAR; INTRAVENOUS at 01:06

## 2022-08-06 RX ADMIN — DIPHENHYDRAMINE HYDROCHLORIDE 12.5 MG: 50 INJECTION, SOLUTION INTRAMUSCULAR; INTRAVENOUS at 01:07

## 2022-08-06 NOTE — ED PROVIDER NOTES
Time: 11:37 PM EDT  Arrived by: private car  Chief Complaint: headache  History provided by: patient  History is limited by: N/A     History of Present Illness:  Patient is a 41 y.o. year old female who presents to the emergency department with headache and nausea x4 days, does not have a history of headaches.  Denies recent fall or injury.        History provided by:  Patient  Headache  Pain location:  Occipital  Quality:  Dull  Radiates to:  Does not radiate  Onset quality:  Sudden  Duration:  4 days  Timing:  Constant  Progression:  Unchanged  Chronicity:  New  Similar to prior headaches: no    Relieved by:  Nothing  Worsened by:  Nothing  Ineffective treatments:  NSAIDs, resting in a darkened room and acetaminophen  Associated symptoms: nausea    Associated symptoms: no abdominal pain, no back pain, no blurred vision, no congestion, no cough, no diarrhea, no dizziness, no drainage, no ear pain, no eye pain, no facial pain, no fatigue, no fever, no focal weakness, no hearing loss, no loss of balance, no myalgias, no near-syncope, no neck pain, no neck stiffness, no numbness, no paresthesias, no photophobia, no seizures, no sinus pressure, no sore throat, no swollen glands, no syncope, no tingling, no URI, no visual change, no vomiting and no weakness        Similar Symptoms Previously: no  Recently seen: no      Patient Care Team  Primary Care Provider: Caitlin Mohamud APRN    Past Medical History:     Allergies   Allergen Reactions   • Morphine Mental Status Change and Unknown - High Severity     Past Medical History:   Diagnosis Date   • Anemia     AFTER FEMORAL-BYPASS SURGERY   • Anxiety    • Contact lens/glasses fitting    • Depression     TAKES ZOLOFT   • Heartburn     FREQUENT   • Obesity    • PONV (postoperative nausea and vomiting)     SCO, 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 Authorizing Provider   albuterol sulfate  (90 Base) MCG/ACT inhaler Inhale 2 puffs Every 4 (Four) Hours As Needed for Shortness of Air. 22   Blanca Fleming APRN   aspirin 81 MG chewable tablet Chew 81 mg Daily.    Provider, MD Arturo   clopidogrel (PLAVIX) 75 MG tablet TAKE ONE TABLET BY MOUTH EVERY DAY 21   Jonah Castañeda MD   clopidogrel (PLAVIX) 75 MG tablet     Emergency, Nurse Epic, RN   gabapentin (NEURONTIN) 300 MG capsule TAKE ONE capsule BY MOUTH EVERY NIGHT AT BEDTIME 22   Caitlin Mohamud APRN   Gabapentin (NEURONTIN) 50 mg/mL solution solution     Emergency, Nurse Epic, RN   guaifenesin-dextromethorphan (MUCINEX DM)  MG tablet sustained-release 12 hour tablet Take 2 tablets by mouth 2 (Two) Times a Day As Needed (cough). 22   Blanca Fleming APRN   HYDROcodone-acetaminophen (NORCO) 7.5-325 MG per tablet Take 1 tablet by mouth Every 8 (Eight) Hours As Needed for Moderate Pain . 22   Kendra Beltre DO   loratadine-pseudoephedrine (Claritin-D 12 Hour) 5-120 MG per 12 hr tablet Take 1 tablet by mouth 2 (Two) Times a Day. 22   Blanca Fleming APRN   PARoxetine (Paxil) 20 MG tablet Take 1 tablet by mouth Every Morning. 22   Caitlin Mohamud APRN   PARoxetine (PAXIL) 20 MG tablet     Emergency, Nurse ELOISE Yu   pravastatin (PRAVACHOL) 80 MG tablet Take 1 tablet by mouth Daily. 22   Caitlin Mohamud APRN   pravastatin (PRAVACHOL) 80 MG tablet TAKE ONE TABLET BY MOUTH EVERY DAY Diagnosis Unavailable    Emergency, Nurse ELOISE Yu   tapentadol (NUCYNTA) 50 MG  tablet Every 8 (Eight) Hours. 6/15/22   Emergency, Nurse ELOISE Yu   vitamin D (ERGOCALCIFEROL) 1.25 MG (27278 UT) capsule capsule Take 1 capsule by mouth 1 (One) Time Per Week. 2/16/22   Caitlin Mohamud APRN   vitamin D (ERGOCALCIFEROL) 1.25 MG (87592 UT) capsule capsule Take 1 capsule by mouth 1 (One) Time Per Week.    Emergency, Nurse Epic, RN   Flowflex COVID-19 Ag Home Test kit FOLLOW DIRECTIONS INSIDE PACKAGE TO TEST FOR COVID-19 7/25/22 8/5/22  Emergency, Nurse Epic, RN   fluconazole (DIFLUCAN) 150 MG tablet Take 1 tablet by mouth as one dose 6/30/22 8/5/22  Emergency, Nurse Gigi RN   methylPREDNISolone (MEDROL) 4 MG dose pack Take as directed on package instructions. 7/28/22 8/5/22  Blanca Fleming APRN        Social History:   Social History     Tobacco Use   • Smoking status: Current Some Day Smoker     Types: Cigarettes   • Smokeless tobacco: Never Used   • Tobacco comment: INTERMITTANT SMOKER    Vaping Use   • Vaping Use: Never used   Substance Use Topics   • Alcohol use: Not Currently   • Drug use: Not Currently     Recent travel: no     Review of Systems:  Review of Systems   Constitutional: Negative for chills, fatigue and fever.   HENT: Negative for congestion, ear pain, hearing loss, postnasal drip, sinus pressure and sore throat.    Eyes: Negative for blurred vision, photophobia and pain.   Respiratory: Negative for cough, chest tightness and shortness of breath.    Cardiovascular: Negative for chest pain, syncope and near-syncope.   Gastrointestinal: Positive for nausea. Negative for abdominal pain, diarrhea and vomiting.   Genitourinary: Negative for flank pain and hematuria.   Musculoskeletal: Negative for back pain, joint swelling, myalgias, neck pain and neck stiffness.   Skin: Negative for pallor.   Neurological: Positive for headaches. Negative for dizziness, focal weakness, seizures, weakness, numbness, paresthesias and loss of balance.   All other systems reviewed and are  "negative.       Physical Exam:  /77 (BP Location: Left arm, Patient Position: Sitting)   Pulse 94   Temp 98.1 °F (36.7 °C) (Oral)   Resp 18   Ht 160 cm (63\")   Wt 101 kg (222 lb 10.6 oz)   SpO2 100%   BMI 39.44 kg/m²     Physical Exam  Vitals and nursing note reviewed.   Constitutional:       General: She is not in acute distress.     Appearance: Normal appearance. She is not toxic-appearing.   HENT:      Head: Normocephalic and atraumatic.      Mouth/Throat:      Mouth: Mucous membranes are moist.   Eyes:      General: No scleral icterus.  Cardiovascular:      Rate and Rhythm: Normal rate and regular rhythm.      Pulses: Normal pulses.      Heart sounds: Normal heart sounds.   Pulmonary:      Effort: Pulmonary effort is normal. No respiratory distress.      Breath sounds: Normal breath sounds.   Abdominal:      General: Abdomen is flat.      Palpations: Abdomen is soft.      Tenderness: There is no abdominal tenderness.   Musculoskeletal:         General: Normal range of motion.      Cervical back: Normal range of motion and neck supple.   Skin:     General: Skin is warm and dry.   Neurological:      General: No focal deficit present.      Mental Status: She is alert and oriented to person, place, and time. Mental status is at baseline.                Medications in the Emergency Department:  Medications   acetaminophen (TYLENOL) tablet 1,000 mg (1,000 mg Oral Given 8/6/22 0106)   sodium chloride 0.9 % bolus 1,000 mL (0 mL Intravenous Stopped 8/6/22 0240)   metoclopramide (REGLAN) injection 10 mg (10 mg Intravenous Given 8/6/22 0106)   diphenhydrAMINE (BENADRYL) injection 12.5 mg (12.5 mg Intravenous Given 8/6/22 0107)   ketorolac (TORADOL) injection 30 mg (30 mg Intravenous Given 8/6/22 0106)        Labs  Lab Results (last 24 hours)     ** No results found for the last 24 hours. **           Imaging:  CT Head Without Contrast    Result Date: 8/6/2022  PROCEDURE: CT HEAD WO CONTRAST  COMPARISON: None.  " INDICATIONS: HEADACHE FOR 2 DAYS.  PROTOCOL:   Standard imaging protocol performed    RADIATION:   DLP: 954.7 mGy*cm   MA and/or KV was adjusted to minimize radiation dose.    TECHNIQUE: After obtaining the patient's consent, 122 CT images were obtained without non-ionic intravenous contrast material.  DISCUSSION:   A routine nonenhanced head CT was performed.  No acute brain abnormality is identified.  No acute intracranial hemorrhage.  No acute infarct is seen.  No acute skull fracture.  No midline shift or acute intracranial mass effect is seen.  The ventricular size and configuration are within normal limits.  The extra-axial spaces are mildly prominent.  Central atrophy is possible.       No acute brain abnormality is seen. Specifically, no acute intracranial hemorrhage, no acute infarct, no intracranial mass lesion, and no acute intracranial mass effect are appreciated.   COMMENT:  Part of this note is an electronic transcription of spoken language to printed text. The electronic translation/transcription may permit erroneous, or at times, nonsensical (or even sensical) words or phrases to be inadvertently transcribed or omitted; this  has reviewed the note for such errors (as well as additional errors); however, some may still exist.  JONO KRUEGER JR, MD       Electronically Signed and Approved By: JONO KRUEGER JR, MD on 8/06/2022 at 2:03                Procedures:  Procedures    Progress  ED Course as of 08/06/22 0615   Fri Aug 05, 2022   1177 --- PROVIDER IN TRIAGE NOTE ---    The patient was evaluated my Nava mcdowell in triage. Orders were placed and the patient is currently awaiting disposition. [AJ]      ED Course User Index  [AJ] Nava Haywood PA-C                            The patient was initially evaluated in the triage area where orders were placed. The patient was later dispositioned by REG Gibson.      Medical Decision Making:  MDM  Number of Diagnoses or  Management Options  Migraine with status migrainosus, not intractable, unspecified migraine type: new and requires workup  Diagnosis management comments: The patient presented to the emergency department with a headache. The patient is now resting comfortably in feels better, is alert, talkative, interactive and in no distress after ED treatment. The patient appears well and is able to tolerate PO fluids. Repeat examination is unremarkable and benign. The patient is neurologically intact, has a normal mental status, and this ambulatory in the ED. The history, exam, diagnostic testing (if any) and the patient's current condition do not suggest meningitis, stroke, sepsis, subarachnoid hemorrhage, intracranial bleeding, encephalitis, temporal arteritis or other significant pathology to warrant further testing, continued ED treatment, admission, neurological consultation, for other specialist evaluation at this point. The vital signs have been stable. The patient's condition is stable and appropriate for discharge. The patient will pursue further outpatient evaluation with the primary care physician or other designated or consulting physician as indicated in the discharge instructions.       Amount and/or Complexity of Data Reviewed  Tests in the radiology section of CPT®: reviewed    Patient Progress  Patient progress: improved       Final diagnoses:   Migraine with status migrainosus, not intractable, unspecified migraine type        Disposition:  ED Disposition     ED Disposition   Discharge    Condition   Stable    Comment   --             This medical record created using voice recognition software.           Ney Reed, APRN  08/06/22 0615

## 2022-08-10 ENCOUNTER — OFFICE VISIT (OUTPATIENT)
Dept: FAMILY MEDICINE CLINIC | Facility: CLINIC | Age: 42
End: 2022-08-10

## 2022-08-10 VITALS
OXYGEN SATURATION: 97 % | HEIGHT: 63 IN | SYSTOLIC BLOOD PRESSURE: 127 MMHG | DIASTOLIC BLOOD PRESSURE: 72 MMHG | BODY MASS INDEX: 38.98 KG/M2 | HEART RATE: 89 BPM | WEIGHT: 220 LBS

## 2022-08-10 DIAGNOSIS — G43.809 OTHER MIGRAINE WITHOUT STATUS MIGRAINOSUS, NOT INTRACTABLE: Primary | ICD-10-CM

## 2022-08-10 DIAGNOSIS — F41.9 ANXIETY: ICD-10-CM

## 2022-08-10 DIAGNOSIS — R11.0 NAUSEA: ICD-10-CM

## 2022-08-10 DIAGNOSIS — E55.9 VITAMIN D DEFICIENCY: ICD-10-CM

## 2022-08-10 PROCEDURE — 99214 OFFICE O/P EST MOD 30 MIN: CPT | Performed by: NURSE PRACTITIONER

## 2022-08-10 RX ORDER — AMITRIPTYLINE HYDROCHLORIDE 10 MG/1
10 TABLET, FILM COATED ORAL NIGHTLY
Qty: 30 TABLET | Refills: 0 | Status: SHIPPED | OUTPATIENT
Start: 2022-08-10 | End: 2022-08-24 | Stop reason: SDUPTHER

## 2022-08-10 RX ORDER — ERGOCALCIFEROL 1.25 MG/1
50000 CAPSULE ORAL WEEKLY
Qty: 12 CAPSULE | Refills: 0 | Status: SHIPPED | OUTPATIENT
Start: 2022-08-10 | End: 2022-10-25 | Stop reason: SDUPTHER

## 2022-08-10 RX ORDER — RIMEGEPANT SULFATE 75 MG/75MG
75 TABLET, ORALLY DISINTEGRATING ORAL DAILY PRN
Qty: 4 TABLET | Refills: 0 | COMMUNITY
Start: 2022-08-10 | End: 2023-01-27

## 2022-08-10 RX ORDER — ONDANSETRON 4 MG/1
4 TABLET, FILM COATED ORAL EVERY 8 HOURS PRN
Qty: 30 TABLET | Refills: 0 | Status: SHIPPED | OUTPATIENT
Start: 2022-08-10 | End: 2022-12-20

## 2022-08-10 NOTE — PROGRESS NOTES
"Chief Complaint  Headache (Pt states that she has been having headache daily since having COVID-19. Pt has tried Tylenol PM, IBU, Advil Migraine, Excedrin Migraine, Norco, and Tramadol nothing has been able to touch her headaches. Pt has sensitively to light, sound, and nausea. ) and Anxiety (Pt states that the headaches are causing her anxiety to go through the roof.)  UDS/NC 03/29/2022  SUBJECTIVE  Janki Krueger presents to [facility]     History of Present Illness   The patient presents today due to post COVID-19 headache.     COVID-19 infection- The patient tested positive for COVID-19 on 07/28/2022. She reports body aches, chills, and fever while she had the COVID-19 infection. She reports receiving a steroid and using Claritin. She used Mucinex DM for a productive cough. She denies any congestion.     Post COVID-19 headache- She reports a headache for 4 days, she went to the emergency room on 08/05/2022 and was given a migraine cocktail. She notes the headache resolved on 08/06/2022, but returned on 08/07/2022. She has tried Excedrin migraine, ibuprofen, Norco, Advil migraine, and Tylenol. The patient has sensitivity to light and sound. The headache starts in the back of her neck and causes nausea. She has tried Rachel head and body. She complains of an increase in her anxiety from the headache. She had a CT scan of her head while in the ER that showed no acute brain abnormality. No acute intracranial hemorrhage. No acute intracranial mass or lesion. She denies a history of headaches. The patient reports she has never had thoughts of self harming, however, she states she felt like \"slamming her head into a wall\". She notes the headache starts in the back of her neck, radiates up into her head, and then her whole head will hurt. She denies vomiting.     Anxiety- The patient reports tightness in her chest. She is using Paxil 20 mg.     The patient needs a refill of vitamin D. 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   • PVD (peripheral vascular disease) ([facility])     SEES       Family History   Problem Relation Age of Onset   • COPD Father    • Diabetes Father    • Vision loss Maternal Grandmother    • Cancer Paternal Grandmother       Past Surgical History:   Procedure Laterality Date   • AORTA FEMORAL BYPASS  2014    REVISION 2016, FEM POP 2017   •  SECTION     • ENDOMETRIAL ABLATION  16        Current Outpatient Medications:   •  vitamin D (ERGOCALCIFEROL) 1.25 MG (94669 UT) capsule capsule, Take 1 capsule by mouth 1 (One) Time Per Week., Disp: 12 capsule, Rfl: 0  •  amitriptyline (ELAVIL) 10 MG tablet, Take 1 tablet by mouth Every Night., Disp: 30 tablet, Rfl: 0  •  aspirin 81 MG chewable tablet, Chew 81 mg Daily., Disp: , Rfl:   •  clopidogrel (PLAVIX) 75 MG tablet, TAKE ONE TABLET BY MOUTH EVERY DAY, Disp: 90 tablet, Rfl: 3  •  gabapentin (NEURONTIN) 300 MG capsule, TAKE ONE capsule BY MOUTH EVERY NIGHT AT BEDTIME, Disp: 90 capsule, Rfl: 0  •  guaifenesin-dextromethorphan (MUCINEX DM)  MG tablet sustained-release 12 hour tablet, Take 2 tablets by mouth 2 (Two) Times a Day As Needed (cough)., Disp: 40 tablet, Rfl: 0  •  HYDROcodone-acetaminophen (NORCO) 7.5-325 MG per tablet, Take 1 tablet by mouth Every 8 (Eight) Hours As Needed for Moderate Pain ., Disp: 90 tablet, Rfl: 0  •  ondansetron (Zofran) 4 MG tablet, Take 1 tablet by mouth Every 8 (Eight) Hours As Needed for Nausea or Vomiting., Disp: 30 tablet, Rfl: 0  •  PARoxetine (Paxil) 20 MG tablet, Take 1 tablet by mouth Every Morning., Disp: 90 tablet, Rfl: 0  •  pravastatin (PRAVACHOL) 80 MG tablet, Take 1 tablet by mouth Daily., Disp: 90 tablet, Rfl: 1  •  Rimegepant  "Sulfate (Nurtec) 75 MG tablet dispersible tablet, Take 1 tablet by mouth Daily As Needed (migraine). Indications: lot [id number] 2 boxes exp 6/24, Disp: 4 tablet, Rfl: 0  •  ubrogepant (ubrogepant) 100 MG tablet, Take 1 tablet by mouth 1 (One) Time As Needed (migraine) for up to 1 dose. Indications: lot [id number] exp 12/22 2 boxes, Disp: 2 tablet, Rfl: 0    OBJECTIVE  Vital Signs:   /72 (BP Location: Right arm, Patient Position: Sitting, Cuff Size: Adult)   Pulse 89   Ht 160 cm (63\")   Wt 99.8 kg (220 lb)   SpO2 97%   Breastfeeding No   BMI 38.97 kg/m²    Estimated body mass index is 38.97 kg/m² as calculated from the following:    Height as of this encounter: 160 cm (63\").    Weight as of this encounter: 99.8 kg (220 lb).     Wt Readings from Last 3 Encounters:   08/10/22 99.8 kg (220 lb)   08/05/22 101 kg (222 lb 10.6 oz)   08/05/22 101 kg (222 lb)     BP Readings from Last 3 Encounters:   08/10/22 127/72   08/05/22 154/77   08/05/22 144/74       Physical Exam  Vitals reviewed.   Constitutional:       Appearance: Normal appearance. She is well-developed.   Neck:      Thyroid: No thyromegaly.      Vascular: No carotid bruit.   Cardiovascular:      Rate and Rhythm: Normal rate and regular rhythm.      Heart sounds: No murmur heard.    No friction rub. No gallop.   Pulmonary:      Effort: Pulmonary effort is normal.      Breath sounds: Normal breath sounds. No wheezing or rhonchi.   Musculoskeletal:      Right lower leg: No edema.      Left lower leg: No edema.   Neurological:      Mental Status: She is alert and oriented to person, place, and time.      Cranial Nerves: No cranial nerve deficit.   Psychiatric:         Mood and Affect: Mood and affect normal.         Behavior: Behavior normal.         Thought Content: Thought content normal.         Judgment: Judgment normal.          Result Review        XR Chest 2 View    Result Date: 7/28/2022   No acute cardiopulmonary process.     GHADA CHENG MD      "  Electronically Signed and Approved By: GHADA CHENG MD on 7/28/2022 at 10:44             CT Head Without Contrast    Result Date: 8/6/2022   No acute brain abnormality is seen. Specifically, no acute intracranial hemorrhage, no acute infarct, no intracranial mass lesion, and no acute intracranial mass effect are appreciated.   COMMENT:  Part of this note is an electronic transcription of spoken language to printed text. The electronic translation/transcription may permit erroneous, or at times, nonsensical (or even sensical) words or phrases to be inadvertently transcribed or omitted; this  has reviewed the note for such errors (as well as additional errors); however, some may still exist.  JONO KRUEGER JR, MD       Electronically Signed and Approved By: JONO KRUEGER JR, MD on 8/06/2022 at 2:03                 The above data has been reviewed by REG Guerra 08/10/2022 07:30 EDT.          Patient Care Team:  Caitlin Mohamud APRN as PCP - General (Nurse Practitioner)         ASSESSMENT & PLAN    Diagnoses and all orders for this visit:    1. Other migraine without status migrainosus, not intractable (Primary)  Comments:  prescribed daily elavil and prn ubrelvy and nurtec  Orders:  -     amitriptyline (ELAVIL) 10 MG tablet; Take 1 tablet by mouth Every Night.  Dispense: 30 tablet; Refill: 0  -     Rimegepant Sulfate (Nurtec) 75 MG tablet dispersible tablet; Take 1 tablet by mouth Daily As Needed (migraine). Indications: lot [id number] 2 boxes exp 6/24  Dispense: 4 tablet; Refill: 0  -     ubrogepant (ubrogepant) 100 MG tablet; Take 1 tablet by mouth 1 (One) Time As Needed (migraine) for up to 1 dose. Indications: lot [id number] exp 12/22 2 boxes  Dispense: 2 tablet; Refill: 0    2. Vitamin D deficiency  Comments:  - I will refill her vitamin D supplement  Orders:  -     vitamin D (ERGOCALCIFEROL) 1.25 MG (37193 UT) capsule capsule; Take 1 capsule by mouth 1 (One) Time Per Week.   Dispense: 12 capsule; Refill: 0    3. Nausea  Comments:  - I will prescribe Zofran for nausea.  Orders:  -     ondansetron (Zofran) 4 MG tablet; Take 1 tablet by mouth Every 8 (Eight) Hours As Needed for Nausea or Vomiting.  Dispense: 30 tablet; Refill: 0    4. Anxiety  Comments:  - She may continue Paxil 20 mg in the morning.    1. Post COVID-19 headache  - I will prescribe Ubrelvy to be taken at the onset of a migraine. She may repeat the dose in 2 hours if the headache does not resolve.  - Prescribe Elavil 10 mg  - Prescribe Nurtec 75 mg                 Tobacco Use: High Risk   • Smoking Tobacco Use: Current Some Day Smoker   • Smokeless Tobacco Use: Never Used       Follow Up     Return in about 2 weeks (around 8/24/2022).      Patient was given instructions and counseling regarding her condition or for health maintenance advice. Please see specific information pulled into the AVS if appropriate.   I have reviewed information obtained and documented by others and I have confirmed the accuracy of this documented note.    REG Guerra      Transcribed from ambient dictation for REG Guerra by Angela Agustin.  08/10/22   09:50 EDT    Patient verbalized consent to the visit recording.

## 2022-08-18 DIAGNOSIS — N76.0 VAGINAL INFECTION: ICD-10-CM

## 2022-08-18 DIAGNOSIS — L02.224 BOIL OF GROIN: Primary | ICD-10-CM

## 2022-08-18 RX ORDER — SULFAMETHOXAZOLE AND TRIMETHOPRIM 800; 160 MG/1; MG/1
1 TABLET ORAL 2 TIMES DAILY
Qty: 20 TABLET | Refills: 0 | Status: SHIPPED | OUTPATIENT
Start: 2022-08-18 | End: 2022-08-28

## 2022-08-18 RX ORDER — FLUCONAZOLE 150 MG/1
150 TABLET ORAL ONCE
Qty: 1 TABLET | Refills: 0 | Status: SHIPPED | OUTPATIENT
Start: 2022-08-18 | End: 2022-08-18 | Stop reason: SDUPTHER

## 2022-08-18 RX ORDER — FLUCONAZOLE 150 MG/1
150 TABLET ORAL ONCE
Qty: 1 TABLET | Refills: 0 | Status: SHIPPED | OUTPATIENT
Start: 2022-08-18 | End: 2022-08-18

## 2022-08-24 ENCOUNTER — LAB (OUTPATIENT)
Dept: LAB | Facility: HOSPITAL | Age: 42
End: 2022-08-24

## 2022-08-24 ENCOUNTER — OFFICE VISIT (OUTPATIENT)
Dept: FAMILY MEDICINE CLINIC | Facility: CLINIC | Age: 42
End: 2022-08-24

## 2022-08-24 VITALS
SYSTOLIC BLOOD PRESSURE: 111 MMHG | OXYGEN SATURATION: 97 % | BODY MASS INDEX: 39.41 KG/M2 | WEIGHT: 222.4 LBS | DIASTOLIC BLOOD PRESSURE: 72 MMHG | HEIGHT: 63 IN | HEART RATE: 93 BPM

## 2022-08-24 DIAGNOSIS — E66.01 MORBID (SEVERE) OBESITY DUE TO EXCESS CALORIES: Primary | ICD-10-CM

## 2022-08-24 DIAGNOSIS — G43.809 OTHER MIGRAINE WITHOUT STATUS MIGRAINOSUS, NOT INTRACTABLE: ICD-10-CM

## 2022-08-24 DIAGNOSIS — Z00.00 ROUTINE GENERAL MEDICAL EXAMINATION AT A HEALTH CARE FACILITY: ICD-10-CM

## 2022-08-24 DIAGNOSIS — E55.9 VITAMIN D DEFICIENCY: ICD-10-CM

## 2022-08-24 LAB
25(OH)D3 SERPL-MCNC: 49.3 NG/ML (ref 30–100)
ALBUMIN SERPL-MCNC: 4.3 G/DL (ref 3.5–5.2)
ALBUMIN/GLOB SERPL: 1.6 G/DL
ALP SERPL-CCNC: 77 U/L (ref 39–117)
ALT SERPL W P-5'-P-CCNC: 18 U/L (ref 1–33)
ANION GAP SERPL CALCULATED.3IONS-SCNC: 10 MMOL/L (ref 5–15)
AST SERPL-CCNC: 16 U/L (ref 1–32)
BASOPHILS # BLD AUTO: 0.02 10*3/MM3 (ref 0–0.2)
BASOPHILS NFR BLD AUTO: 0.4 % (ref 0–1.5)
BILIRUB SERPL-MCNC: 0.2 MG/DL (ref 0–1.2)
BILIRUB UR QL STRIP: NEGATIVE
BUN SERPL-MCNC: 10 MG/DL (ref 6–20)
BUN/CREAT SERPL: 15.6 (ref 7–25)
CALCIUM SPEC-SCNC: 8.9 MG/DL (ref 8.6–10.5)
CHLORIDE SERPL-SCNC: 103 MMOL/L (ref 98–107)
CHOLEST SERPL-MCNC: 146 MG/DL (ref 0–200)
CLARITY UR: CLEAR
CO2 SERPL-SCNC: 27 MMOL/L (ref 22–29)
COLOR UR: YELLOW
CREAT SERPL-MCNC: 0.64 MG/DL (ref 0.57–1)
DEPRECATED RDW RBC AUTO: 42.3 FL (ref 37–54)
EGFRCR SERPLBLD CKD-EPI 2021: 112.6 ML/MIN/1.73
EOSINOPHIL # BLD AUTO: 0.11 10*3/MM3 (ref 0–0.4)
EOSINOPHIL NFR BLD AUTO: 2.1 % (ref 0.3–6.2)
ERYTHROCYTE [DISTWIDTH] IN BLOOD BY AUTOMATED COUNT: 12.6 % (ref 12.3–15.4)
GLOBULIN UR ELPH-MCNC: 2.7 GM/DL
GLUCOSE SERPL-MCNC: 91 MG/DL (ref 65–99)
GLUCOSE UR STRIP-MCNC: NEGATIVE MG/DL
HCT VFR BLD AUTO: 42.8 % (ref 34–46.6)
HDLC SERPL-MCNC: 38 MG/DL (ref 40–60)
HGB BLD-MCNC: 13.9 G/DL (ref 12–15.9)
HGB UR QL STRIP.AUTO: NEGATIVE
IMM GRANULOCYTES # BLD AUTO: 0.01 10*3/MM3 (ref 0–0.05)
IMM GRANULOCYTES NFR BLD AUTO: 0.2 % (ref 0–0.5)
KETONES UR QL STRIP: NEGATIVE
LDLC SERPL CALC-MCNC: 94 MG/DL (ref 0–100)
LDLC/HDLC SERPL: 2.48 {RATIO}
LEUKOCYTE ESTERASE UR QL STRIP.AUTO: NEGATIVE
LYMPHOCYTES # BLD AUTO: 1.83 10*3/MM3 (ref 0.7–3.1)
LYMPHOCYTES NFR BLD AUTO: 34.5 % (ref 19.6–45.3)
MCH RBC QN AUTO: 30 PG (ref 26.6–33)
MCHC RBC AUTO-ENTMCNC: 32.5 G/DL (ref 31.5–35.7)
MCV RBC AUTO: 92.2 FL (ref 79–97)
MONOCYTES # BLD AUTO: 0.41 10*3/MM3 (ref 0.1–0.9)
MONOCYTES NFR BLD AUTO: 7.7 % (ref 5–12)
NEUTROPHILS NFR BLD AUTO: 2.93 10*3/MM3 (ref 1.7–7)
NEUTROPHILS NFR BLD AUTO: 55.1 % (ref 42.7–76)
NITRITE UR QL STRIP: NEGATIVE
NRBC BLD AUTO-RTO: 0 /100 WBC (ref 0–0.2)
PH UR STRIP.AUTO: 6.5 [PH] (ref 5–8)
PLATELET # BLD AUTO: 245 10*3/MM3 (ref 140–450)
PMV BLD AUTO: 10.2 FL (ref 6–12)
POTASSIUM SERPL-SCNC: 4.4 MMOL/L (ref 3.5–5.2)
PROT SERPL-MCNC: 7 G/DL (ref 6–8.5)
PROT UR QL STRIP: ABNORMAL
RBC # BLD AUTO: 4.64 10*6/MM3 (ref 3.77–5.28)
SODIUM SERPL-SCNC: 140 MMOL/L (ref 136–145)
SP GR UR STRIP: 1.03 (ref 1–1.03)
TRIGL SERPL-MCNC: 69 MG/DL (ref 0–150)
TSH SERPL DL<=0.05 MIU/L-ACNC: 3.9 UIU/ML (ref 0.27–4.2)
UROBILINOGEN UR QL STRIP: ABNORMAL
VIT B12 BLD-MCNC: 371 PG/ML (ref 211–946)
VLDLC SERPL-MCNC: 14 MG/DL (ref 5–40)
WBC NRBC COR # BLD: 5.31 10*3/MM3 (ref 3.4–10.8)

## 2022-08-24 PROCEDURE — 80053 COMPREHEN METABOLIC PANEL: CPT

## 2022-08-24 PROCEDURE — 99214 OFFICE O/P EST MOD 30 MIN: CPT | Performed by: NURSE PRACTITIONER

## 2022-08-24 PROCEDURE — 36415 COLL VENOUS BLD VENIPUNCTURE: CPT

## 2022-08-24 PROCEDURE — 85025 COMPLETE CBC W/AUTO DIFF WBC: CPT

## 2022-08-24 PROCEDURE — 84443 ASSAY THYROID STIM HORMONE: CPT

## 2022-08-24 PROCEDURE — 80061 LIPID PANEL: CPT

## 2022-08-24 PROCEDURE — 82607 VITAMIN B-12: CPT

## 2022-08-24 PROCEDURE — 81003 URINALYSIS AUTO W/O SCOPE: CPT

## 2022-08-24 PROCEDURE — 82306 VITAMIN D 25 HYDROXY: CPT

## 2022-08-24 RX ORDER — AMITRIPTYLINE HYDROCHLORIDE 10 MG/1
10 TABLET, FILM COATED ORAL NIGHTLY
Qty: 90 TABLET | Refills: 1 | Status: SHIPPED | OUTPATIENT
Start: 2022-08-24 | End: 2023-03-15 | Stop reason: SDUPTHER

## 2022-08-24 RX ORDER — AMOXICILLIN 500 MG/1
CAPSULE ORAL
COMMUNITY
Start: 2022-08-15 | End: 2022-10-07

## 2022-08-24 NOTE — PROGRESS NOTES
"Chief Complaint  Other migraine without status migrainosus, not intractable and Weight Loss    SUBJECTIVE  Janki Krueger presents to [facility] for a 2-week follow-up on her headaches.    History of Present Illness     Migraine - The patient states the amitriptyline is helpful. She reports she only had 1 headaches since starting treatment. She denies any adverse reactions from the medication. The patient would like to continue treatment, and she requests a 90-day refill.     Obesity - The patient has been attempting weight loss by walking on the treadmill and monitoring her diet. She notes she is unable to exercise often, and she reports she is eating 2 meals per day. The patient would like to consult a nutritionist. She states she is not opposed to daily injection therapy. She denies any history of pancreatic cancer or thyroid cancer in her family. The patient states she has taken phentermine in the past, and it caused \"jitters\" and irritability.    Furuncle - The patient reports she inserted HotHands Warmers inside of her underwear, and it caused her furuncle to rupture. She states her symptoms have resolved.     Vitamin D deficiency - The patient is currently taking a vitamin D supplement.    Health maintenance - The patient has been taking Plavix for 1.5 to 2 years per Dr. Castañeda for peripheral vascular disease. She is scheduled for dental work on 08/31/2022 and states she was advised to discontinue Plavix and aspirin therapy for 2 days prior to undergoing dental work. She notes she will take 4 amoxicillin capsules 1 hour prior to her procedure, and she states she will complete Bactrim DS therapy in 1 to 2 days. The patient's blood pressure is 111/72 mmHg today. Her last laboratory testing was collected on 08/24/2021, and she is due for annual testing.  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   • PVD (peripheral vascular disease) ([facility])     SEES       Family History   Problem Relation Age of Onset   • COPD Father    • Diabetes Father    • Vision loss Maternal Grandmother    • Cancer Paternal Grandmother       Past Surgical History:   Procedure Laterality Date   • AORTA FEMORAL BYPASS  2014    REVISION NOVE , FEM POP 2017   •  SECTION     • ENDOMETRIAL ABLATION  16        Current Outpatient Medications:   •  amitriptyline (ELAVIL) 10 MG tablet, Take 1 tablet by mouth Every Night., Disp: 90 tablet, Rfl: 1  •  amoxicillin (AMOXIL) 500 MG capsule, TAKE 4 capsules by mouth 1 hour prior to appointment, Disp: , Rfl:   •  aspirin 81 MG chewable tablet, Chew 81 mg Daily., Disp: , Rfl:   •  clopidogrel (PLAVIX) 75 MG tablet, TAKE ONE TABLET BY MOUTH EVERY DAY, Disp: 90 tablet, Rfl: 3  •  gabapentin (NEURONTIN) 300 MG capsule, TAKE ONE capsule BY MOUTH EVERY NIGHT AT BEDTIME, Disp: 90 capsule, Rfl: 0  •  guaifenesin-dextromethorphan (MUCINEX DM)  MG tablet sustained-release 12 hour tablet, Take 2 tablets by mouth 2 (Two) Times a Day As Needed (cough)., Disp: 40 tablet, Rfl: 0  •  HYDROcodone-acetaminophen (NORCO) 7.5-325 MG per tablet, Take 1 tablet by mouth Every 8 (Eight) Hours As Needed for Moderate Pain ., Disp: 90 tablet, Rfl: 0  •  ondansetron (Zofran) 4 MG tablet, Take 1 tablet by mouth Every 8 (Eight) Hours As Needed for Nausea or Vomiting., Disp: 30 tablet, Rfl: 0  •  PARoxetine (Paxil) 20 MG tablet, Take 1 tablet by mouth Every Morning., Disp: 90 tablet, Rfl: 0  •  pravastatin (PRAVACHOL) 80 MG tablet, Take 1 tablet by mouth Daily., Disp: 90 tablet, Rfl: 1  •  Rimegepant Sulfate (Nurtec) 75 MG tablet dispersible tablet, Take 1 tablet by mouth Daily As Needed (migraine). Indications: lot [id number] 2 boxes exp , Disp: 4  "tablet, Rfl: 0  •  sulfamethoxazole-trimethoprim (BACTRIM DS,SEPTRA DS) 800-160 MG per tablet, Take 1 tablet by mouth 2 (Two) Times a Day for 10 days., Disp: 20 tablet, Rfl: 0  •  ubrogepant (ubrogepant) 100 MG tablet, Take 1 tablet by mouth 1 (One) Time As Needed (migraine) for up to 1 dose. Indications: lot [id number] exp 12/22 2 boxes, Disp: 2 tablet, Rfl: 0  •  vitamin D (ERGOCALCIFEROL) 1.25 MG (62992 UT) capsule capsule, Take 1 capsule by mouth 1 (One) Time Per Week., Disp: 12 capsule, Rfl: 0    OBJECTIVE  Vital Signs:   /72 (BP Location: Right arm, Patient Position: Sitting, Cuff Size: Adult)   Pulse 93   Ht 160 cm (63\")   Wt 101 kg (222 lb 6.4 oz)   SpO2 97%   BMI 39.40 kg/m²    Estimated body mass index is 39.4 kg/m² as calculated from the following:    Height as of this encounter: 160 cm (63\").    Weight as of this encounter: 101 kg (222 lb 6.4 oz).     Wt Readings from Last 3 Encounters:   08/24/22 101 kg (222 lb 6.4 oz)   08/10/22 99.8 kg (220 lb)   08/05/22 101 kg (222 lb 10.6 oz)     BP Readings from Last 3 Encounters:   08/24/22 111/72   08/10/22 127/72   08/05/22 154/77       Physical Exam  Vitals reviewed.   Constitutional:       Appearance: Normal appearance. She is well-developed.   Neck:      Thyroid: No thyromegaly.      Vascular: No carotid bruit.   Cardiovascular:      Rate and Rhythm: Normal rate and regular rhythm.      Heart sounds: No murmur heard.    No friction rub. No gallop.   Pulmonary:      Effort: Pulmonary effort is normal.      Breath sounds: Normal breath sounds. No wheezing or rhonchi.   Musculoskeletal:      Right lower leg: No edema.      Left lower leg: No edema.   Neurological:      Mental Status: She is alert and oriented to person, place, and time.      Cranial Nerves: No cranial nerve deficit.   Psychiatric:         Mood and Affect: Mood and affect normal.         Behavior: Behavior normal.         Thought Content: Thought content normal.         Judgment: " Judgment normal.          Result Review                        XR Chest 2 View    Result Date: 7/28/2022   No acute cardiopulmonary process.     GHADA CHENG MD       Electronically Signed and Approved By: GHADA CHENG MD on 7/28/2022 at 10:44             CT Head Without Contrast    Result Date: 8/6/2022   No acute brain abnormality is seen. Specifically, no acute intracranial hemorrhage, no acute infarct, no intracranial mass lesion, and no acute intracranial mass effect are appreciated.   COMMENT:  Part of this note is an electronic transcription of spoken language to printed text. The electronic translation/transcription may permit erroneous, or at times, nonsensical (or even sensical) words or phrases to be inadvertently transcribed or omitted; this  has reviewed the note for such errors (as well as additional errors); however, some may still exist.  JONO KRUEGER JR, MD       Electronically Signed and Approved By: JONO KRUEGER JR, MD on 8/06/2022 at 2:03                  The above data has been reviewed by REG Guerra 08/24/2022 08:11 EDT.          Patient Care Team:  Caitlin Mohamud APRN as PCP - General (Nurse Practitioner)    Class 2 Severe Obesity (BMI >=35 and <=39.9). Obesity-related health conditions include the following: GERD. Obesity is unchanged. BMI is is above average; BMI management plan is completed. We discussed low calorie, low carb based diet program, portion control, increasing exercise, joining a fitness center or start home based exercise program and pharmacologic options including saxenda, wegovy, plenity.       ASSESSMENT & PLAN    Diagnoses and all orders for this visit:    1. Morbid (severe) obesity due to excess calories (HCC) (Primary)  Comments:  She will undergo laboratory testing to rule out possible abnormalities. She is being referred to nutrition services.  Orders:  -     Ambulatory Referral to Nutrition Services    2. Other migraine  without status migrainosus, not intractable  Comments:  prescribed daily elavil and prn ubrelvy and nurtec. She will continue Elavil 10 mg 1 tablet every night. Refills were ordered today.  Orders:  -     amitriptyline (ELAVIL) 10 MG tablet; Take 1 tablet by mouth Every Night.  Dispense: 90 tablet; Refill: 1    3. Routine general medical examination at a health care facility  Comments:  She will undergo fasting annual laboratory testing today.     Orders:  -     CBC & Differential; Future  -     Vitamin B12; Future  -     Lipid Panel; Future  -     TSH; Future  -     Comprehensive Metabolic Panel; Future  -     Urinalysis With Culture If Indicated -; Future    4. Vitamin D deficiency  Comments:  She will undergo vitamin D testing today.    Orders:  -     Vitamin D 25 Hydroxy; Future         Tobacco Use: High Risk   • Smoking Tobacco Use: Current Some Day Smoker   • Smokeless Tobacco Use: Never Used       Follow Up     Return for Next scheduled follow up.      Patient was given instructions and counseling regarding her condition or for health maintenance advice. Please see specific information pulled into the AVS if appropriate.   I have reviewed information obtained and documented by others and I have confirmed the accuracy of this documented note.    REG Guerra      Transcribed from ambient dictation for REG Guerra by Jeanne Claros.  08/24/22   11:16 EDT    Patient verbalized consent to the visit recording.

## 2022-08-26 DIAGNOSIS — E66.01 MORBID (SEVERE) OBESITY DUE TO EXCESS CALORIES: Primary | ICD-10-CM

## 2022-08-26 RX ORDER — PEN NEEDLE, DIABETIC 30 GX3/16"
1 NEEDLE, DISPOSABLE MISCELLANEOUS DAILY
Qty: 100 EACH | Refills: 0 | Status: SHIPPED | OUTPATIENT
Start: 2022-08-26 | End: 2022-12-20

## 2022-08-29 ENCOUNTER — HOSPITAL ENCOUNTER (OUTPATIENT)
Dept: CARDIOLOGY | Facility: HOSPITAL | Age: 42
Discharge: HOME | End: 2022-08-29

## 2022-08-29 ENCOUNTER — OFFICE VISIT (OUTPATIENT)
Dept: VASCULAR SURGERY | Facility: HOSPITAL | Age: 42
End: 2022-08-29

## 2022-08-29 VITALS
HEIGHT: 63 IN | SYSTOLIC BLOOD PRESSURE: 130 MMHG | BODY MASS INDEX: 39.34 KG/M2 | RESPIRATION RATE: 16 BRPM | TEMPERATURE: 97.3 F | WEIGHT: 222 LBS | OXYGEN SATURATION: 98 % | HEART RATE: 89 BPM | DIASTOLIC BLOOD PRESSURE: 80 MMHG

## 2022-08-29 DIAGNOSIS — I70.213 ATHEROSCLEROSIS OF NATIVE ARTERIES OF EXTREMITIES WITH INTERMITTENT CLAUDICATION, BILATERAL LEGS: ICD-10-CM

## 2022-08-29 DIAGNOSIS — I70.219 ATHEROSCLEROSIS OF LOWER EXTREMITY WITH CLAUDICATION: ICD-10-CM

## 2022-08-29 DIAGNOSIS — I73.9 CLAUDICATION: Primary | ICD-10-CM

## 2022-08-29 LAB
BH CV LOWER ARTERIAL LEFT ABI RATIO: 0.91
BH CV LOWER ARTERIAL LEFT DORSALIS PEDIS SYS MAX: 112
BH CV LOWER ARTERIAL LEFT GREAT TOE SYS MAX: 106
BH CV LOWER ARTERIAL LEFT LOW THIGH SYS MAX: 108
BH CV LOWER ARTERIAL LEFT POPLITEAL SYS MAX: 116
BH CV LOWER ARTERIAL LEFT POST TIBIAL SYS MAX: 115
BH CV LOWER ARTERIAL LEFT TBI RATIO: 0.84
BH CV LOWER ARTERIAL RIGHT ABI RATIO: 1.06
BH CV LOWER ARTERIAL RIGHT DORSALIS PEDIS SYS MAX: 132
BH CV LOWER ARTERIAL RIGHT GREAT TOE SYS MAX: 91
BH CV LOWER ARTERIAL RIGHT LOW THIGH SYS MAX: 119
BH CV LOWER ARTERIAL RIGHT POPLITEAL SYS MAX: 130
BH CV LOWER ARTERIAL RIGHT POST TIBIAL SYS MAX: 133
BH CV LOWER ARTERIAL RIGHT TBI RATIO: 0.72
MAXIMAL PREDICTED HEART RATE: 177 BPM
STRESS TARGET HR: 150 BPM
UPPER ARTERIAL LEFT ARM BRACHIAL SYS MAX: 125 MMHG
UPPER ARTERIAL RIGHT ARM BRACHIAL SYS MAX: 126 MMHG

## 2022-08-29 PROCEDURE — 99212 OFFICE O/P EST SF 10 MIN: CPT | Performed by: SURGERY

## 2022-08-29 PROCEDURE — 93923 UPR/LXTR ART STDY 3+ LVLS: CPT

## 2022-08-29 PROCEDURE — 93923 UPR/LXTR ART STDY 3+ LVLS: CPT | Performed by: SURGERY

## 2022-08-29 NOTE — PROGRESS NOTES
[facility]   Follow up Office    Patient Name: Janki Krueger  : 1980  MRN: [id number]  Primary Care Physician:  Caitlin Mohamud APRN      Subjective   Subjective     HPI:    Janki Krueger is a 41 y.o. female here for routine follow-up for her claudication.  She indicates that she cannot walk a very short distance before she starts having some pain and cramps in her hips that extends to the lower legs.  At this time however she feels that she can manage.  She would rather continue as current rather than have an intervention.      Objective     Vitals:   Temp:  [97.3 °F (36.3 °C)] 97.3 °F (36.3 °C)  Heart Rate:  [89] 89  Resp:  [16] 16  BP: (130)/(80) 130/80    Physical Exam      General: Alert, no acute distress.  Extremities: Symmetric.    Diagnostic studies: An arterial Doppler in the office today demonstrates ABIs of 1.06 on the right, 0.91 on the left.    Assessment & Plan   Assessment / Plan     Diagnoses and all orders for this visit:    1. Claudication (HCC) (Primary)       Assessment/Plan:   Janki has demonstrated stable findings regarding her aortoiliac occlusive disease.  At this time she is not interested in proceeding into an intervention.  The plan is to continue conservative therapy with a follow-up with a repeat arterial Doppler in 1 year.  She is to follow-up sooner should there be any changes in her symptomatology or should she desire to proceed with further intervention.  She appears to understand and agrees with the plan.        Electronically signed by Jonah Castañeda MD, 22, 10:28 AM EDT.

## 2022-09-01 DIAGNOSIS — E66.01 MORBID (SEVERE) OBESITY DUE TO EXCESS CALORIES: Primary | ICD-10-CM

## 2022-09-01 RX ORDER — SEMAGLUTIDE 0.25 MG/.5ML
0.25 INJECTION, SOLUTION SUBCUTANEOUS
Qty: 0.5 ML | Refills: 0 | Status: SHIPPED | OUTPATIENT
Start: 2022-09-01 | End: 2022-10-25

## 2022-09-18 DIAGNOSIS — F32.A ANXIETY AND DEPRESSION: ICD-10-CM

## 2022-09-18 DIAGNOSIS — F41.9 ANXIETY AND DEPRESSION: ICD-10-CM

## 2022-09-19 RX ORDER — PAROXETINE HYDROCHLORIDE 20 MG/1
20 TABLET, FILM COATED ORAL EVERY MORNING
Qty: 90 TABLET | Refills: 1 | Status: SHIPPED | OUTPATIENT
Start: 2022-09-19 | End: 2023-03-03

## 2022-10-07 DIAGNOSIS — L02.224 BOIL OF GROIN: Primary | ICD-10-CM

## 2022-10-07 RX ORDER — CEPHALEXIN 500 MG/1
500 CAPSULE ORAL 2 TIMES DAILY
Qty: 20 CAPSULE | Refills: 0 | Status: SHIPPED | OUTPATIENT
Start: 2022-10-07 | End: 2022-10-17

## 2022-10-10 DIAGNOSIS — B37.9 CANDIDA INFECTION: Primary | ICD-10-CM

## 2022-10-10 RX ORDER — FLUCONAZOLE 150 MG/1
150 TABLET ORAL ONCE
Qty: 1 TABLET | Refills: 0 | Status: SHIPPED | OUTPATIENT
Start: 2022-10-10 | End: 2022-10-10

## 2022-10-16 DIAGNOSIS — G89.4 CHRONIC PAIN SYNDROME: ICD-10-CM

## 2022-10-17 RX ORDER — GABAPENTIN 300 MG/1
CAPSULE ORAL
Qty: 90 CAPSULE | Refills: 0 | Status: SHIPPED | OUTPATIENT
Start: 2022-10-17 | End: 2023-01-13 | Stop reason: SDUPTHER

## 2022-10-25 ENCOUNTER — OFFICE VISIT (OUTPATIENT)
Dept: FAMILY MEDICINE CLINIC | Facility: CLINIC | Age: 42
End: 2022-10-25

## 2022-10-25 VITALS
HEIGHT: 63 IN | OXYGEN SATURATION: 100 % | BODY MASS INDEX: 39.34 KG/M2 | WEIGHT: 222 LBS | DIASTOLIC BLOOD PRESSURE: 61 MMHG | SYSTOLIC BLOOD PRESSURE: 124 MMHG | HEART RATE: 91 BPM

## 2022-10-25 DIAGNOSIS — E66.01 MORBID (SEVERE) OBESITY DUE TO EXCESS CALORIES: Primary | ICD-10-CM

## 2022-10-25 DIAGNOSIS — E55.9 VITAMIN D DEFICIENCY: ICD-10-CM

## 2022-10-25 DIAGNOSIS — B37.9 CANDIDA INFECTION: ICD-10-CM

## 2022-10-25 DIAGNOSIS — Z79.899 LONG TERM USE OF DRUG: ICD-10-CM

## 2022-10-25 LAB
AMPHET+METHAMPHET UR QL: NEGATIVE
AMPHETAMINE INTERNAL CONTROL: ABNORMAL
AMPHETAMINES UR QL: NEGATIVE
BARBITURATE INTERNAL CONTROL: ABNORMAL
BARBITURATES UR QL SCN: NEGATIVE
BENZODIAZ UR QL SCN: NEGATIVE
BENZODIAZEPINE INTERNAL CONTROL: ABNORMAL
BUPRENORPHINE INTERNAL CONTROL: ABNORMAL
BUPRENORPHINE SERPL-MCNC: NEGATIVE NG/ML
CANNABINOIDS SERPL QL: POSITIVE
COCAINE INTERNAL CONTROL: ABNORMAL
COCAINE UR QL: NEGATIVE
EXPIRATION DATE: ABNORMAL
Lab: ABNORMAL
MDMA (ECSTASY) INTERNAL CONTROL: ABNORMAL
MDMA UR QL SCN: NEGATIVE
METHADONE INTERNAL CONTROL: ABNORMAL
METHADONE UR QL SCN: NEGATIVE
METHAMPHETAMINE INTERNAL CONTROL: ABNORMAL
OPIATES INTERNAL CONTROL: ABNORMAL
OPIATES UR QL: NEGATIVE
OXYCODONE INTERNAL CONTROL: ABNORMAL
OXYCODONE UR QL SCN: NEGATIVE
PCP UR QL SCN: NEGATIVE
PHENCYCLIDINE INTERNAL CONTROL: ABNORMAL
THC INTERNAL CONTROL: ABNORMAL

## 2022-10-25 PROCEDURE — 80305 DRUG TEST PRSMV DIR OPT OBS: CPT | Performed by: NURSE PRACTITIONER

## 2022-10-25 PROCEDURE — 99214 OFFICE O/P EST MOD 30 MIN: CPT | Performed by: NURSE PRACTITIONER

## 2022-10-25 RX ORDER — ERGOCALCIFEROL 1.25 MG/1
50000 CAPSULE ORAL WEEKLY
Qty: 12 CAPSULE | Refills: 0 | Status: SHIPPED | OUTPATIENT
Start: 2022-10-25 | End: 2023-01-30 | Stop reason: SDUPTHER

## 2022-10-25 RX ORDER — FLUCONAZOLE 150 MG/1
150 TABLET ORAL ONCE
Qty: 1 TABLET | Refills: 0 | Status: SHIPPED | OUTPATIENT
Start: 2022-10-25 | End: 2022-10-25

## 2022-10-25 RX ORDER — IBUPROFEN/PSEUDOEPHEDRINE HCL 200MG-30MG
3 TABLET ORAL
COMMUNITY

## 2022-10-25 RX ORDER — PHENTERMINE HYDROCHLORIDE 15 MG/1
15 CAPSULE ORAL EVERY MORNING
Qty: 30 CAPSULE | Refills: 0 | Status: SHIPPED | OUTPATIENT
Start: 2022-10-25 | End: 2022-11-30 | Stop reason: DRUGHIGH

## 2022-10-25 NOTE — PROGRESS NOTES
Chief Complaint  Morbid (severe) obesity due to excess calories (HCC)    SUBJECTIVE  Janki Krueger presents to [facility]    History of Present Illness     The patient presents today for a 3-month follow-up. Her blood pressure is good today at 124/61 mmHg. She is trying weight loss due to not being able to get insurance. She has been on phentermine in the past, but it made her irritable and a little bit shaky. She has never had blood pressure issues. She was on a higher dose of phentermine in the past.    Anxiety - She has been dealing with her anxiety lately, and is taking CBD gummies.    Yeast infection - Every time she takes antibiotics, she gets a yeast infection. She takes Diflucan after she finishes her antibiotics. She does not have a yeast infection currently.    Migraines - She has not had to use the Nurtec or amitriptyline.    Vitamin D deficiency - She is taking vitamin D once a week. Her last labs were in 2022 and her vitamin D level was 49.3.    She refuses the influenza vaccine at this time. The patient repots her goal weight is 150 pounds.    Past Medical History:   Diagnosis Date   • Anemia     AFTER FEMORAL-BYPASS SURGERY   • Anxiety    • Contact lens/glasses fitting    • Depression     TAKES ZOLOFT   • Heartburn     FREQUENT   • Obesity    • PONV (postoperative nausea and vomiting)     [employer], PATCH WORKS   • PVD (peripheral vascular disease) ([facility])     SEES       Family History   Problem Relation Age of Onset   • COPD Father    • Diabetes Father    • Vision loss Maternal Grandmother    • Cancer Paternal Grandmother       Past Surgical History:   Procedure Laterality Date   • AORTA FEMORAL BYPASS  2014    REVISION 2016, FEM POP 2017   •  SECTION     • ENDOMETRIAL ABLATION  16        Current Outpatient Medications:   •  vitamin D (ERGOCALCIFEROL) 1.25 MG (82848 UT) capsule capsule, Take 1 capsule by mouth 1 (One) Time Per Week.,  "Disp: 12 capsule, Rfl: 0  •  amitriptyline (ELAVIL) 10 MG tablet, Take 1 tablet by mouth Every Night., Disp: 90 tablet, Rfl: 1  •  aspirin 81 MG chewable tablet, Chew 81 mg Daily., Disp: , Rfl:   •  clopidogrel (PLAVIX) 75 MG tablet, TAKE ONE TABLET BY MOUTH EVERY DAY, Disp: 90 tablet, Rfl: 3  •  gabapentin (NEURONTIN) 300 MG capsule, TAKE 1 CAPSULE BY MOUTH EVERY NIGHT AT BEDTIME, Disp: 90 capsule, Rfl: 0  •  HYDROcodone-acetaminophen (NORCO) 7.5-325 MG per tablet, Take 1 tablet by mouth Every 8 (Eight) Hours As Needed for Moderate Pain ., Disp: 90 tablet, Rfl: 0  •  Insulin Pen Needle (Pen Needles) 32G X 4 MM misc, 1 each Daily., Disp: 100 each, Rfl: 0  •  Melatonin 3 MG tablet dispersible, Take 1 tablet by mouth every night at bedtime., Disp: , Rfl:   •  ondansetron (Zofran) 4 MG tablet, Take 1 tablet by mouth Every 8 (Eight) Hours As Needed for Nausea or Vomiting., Disp: 30 tablet, Rfl: 0  •  PARoxetine (PAXIL) 20 MG tablet, Take 1 tablet by mouth Every Morning., Disp: 90 tablet, Rfl: 1  •  phentermine 15 MG capsule, Take 1 capsule by mouth Every Morning., Disp: 30 capsule, Rfl: 0  •  pravastatin (PRAVACHOL) 80 MG tablet, Take 1 tablet by mouth Daily., Disp: 90 tablet, Rfl: 1  •  Rimegepant Sulfate (Nurtec) 75 MG tablet dispersible tablet, Take 1 tablet by mouth Daily As Needed (migraine). Indications: lot [id number] 2 boxes exp 6/24, Disp: 4 tablet, Rfl: 0  •  ubrogepant (ubrogepant) 100 MG tablet, Take 1 tablet by mouth 1 (One) Time As Needed (migraine) for up to 1 dose. Indications: lot [id number] exp 12/22 2 boxes, Disp: 2 tablet, Rfl: 0    OBJECTIVE  Vital Signs:   /61 (BP Location: Left arm, Patient Position: Sitting, Cuff Size: Large Adult)   Pulse 91   Ht 160 cm (63\")   Wt 101 kg (222 lb)   SpO2 100%   BMI 39.33 kg/m²    Estimated body mass index is 39.33 kg/m² as calculated from the following:    Height as of this encounter: 160 cm (63\").    Weight as of this encounter: 101 kg (222 lb).     Wt " Readings from Last 3 Encounters:   10/25/22 101 kg (222 lb)   08/29/22 101 kg (222 lb)   08/24/22 101 kg (222 lb 6.4 oz)     BP Readings from Last 3 Encounters:   10/25/22 124/61   08/29/22 130/80   08/24/22 111/72           Physical Exam  Vitals reviewed.   Constitutional:       General: She is not in acute distress.     Appearance: Normal appearance. She is well-developed.   HENT:      Head: Normocephalic and atraumatic.   Eyes:      Conjunctiva/sclera: Conjunctivae normal.      Pupils: Pupils are equal, round, and reactive to light.   Cardiovascular:      Rate and Rhythm: Normal rate and regular rhythm.      Heart sounds: Normal heart sounds. No murmur heard.  Pulmonary:      Effort: Pulmonary effort is normal. No respiratory distress.      Breath sounds: Normal breath sounds.   Skin:     General: Skin is warm and dry.   Neurological:      Mental Status: She is alert and oriented to person, place, and time.   Psychiatric:         Mood and Affect: Mood and affect normal.         Behavior: Behavior normal.         Thought Content: Thought content normal.         Judgment: Judgment normal.          Result Review    CMP    CMP 8/24/22   Glucose 91   BUN 10   Creatinine 0.64   Sodium 140   Potassium 4.4   Chloride 103   Calcium 8.9   Albumin 4.30   Total Bilirubin 0.2   Alkaline Phosphatase 77   AST (SGOT) 16   ALT (SGPT) 18           CBC    CBC 8/24/22   WBC 5.31   RBC 4.64   Hemoglobin 13.9   Hematocrit 42.8   MCV 92.2   MCH 30.0   MCHC 32.5   RDW 12.6   Platelets 245           CBC w/diff    CBC w/Diff 8/24/22   WBC 5.31   RBC 4.64   Hemoglobin 13.9   Hematocrit 42.8   MCV 92.2   MCH 30.0   MCHC 32.5   RDW 12.6   Platelets 245   Neutrophil Rel % 55.1   Immature Granulocyte Rel % 0.2   Lymphocyte Rel % 34.5   Monocyte Rel % 7.7   Eosinophil Rel % 2.1   Basophil Rel % 0.4           Lipid Panel    Lipid Panel 8/24/22   Total Cholesterol 146   Triglycerides 69   HDL Cholesterol 38 (A)   VLDL Cholesterol 14   LDL  Cholesterol  94   LDL/HDL Ratio 2.48   (A) Abnormal value            TSH    TSH 8/24/22   TSH 3.900             XR Chest 2 View    Result Date: 7/28/2022   No acute cardiopulmonary process.     GHADA CHENG MD       Electronically Signed and Approved By: GHADA CHENG MD on 7/28/2022 at 10:44             CT Head Without Contrast    Result Date: 8/6/2022   No acute brain abnormality is seen. Specifically, no acute intracranial hemorrhage, no acute infarct, no intracranial mass lesion, and no acute intracranial mass effect are appreciated.   COMMENT:  Part of this note is an electronic transcription of spoken language to printed text. The electronic translation/transcription may permit erroneous, or at times, nonsensical (or even sensical) words or phrases to be inadvertently transcribed or omitted; this  has reviewed the note for such errors (as well as additional errors); however, some may still exist.  JONO KRUEGER JR, MD       Electronically Signed and Approved By: JONO KRUEGER JR, MD on 8/06/2022 at 2:03                 The above data has been reviewed by REG Guerra  10/25/2022 11:31 EDT.          Patient Care Team:  Caitlin Mohamud APRN as PCP - General (Nurse Practitioner)    Class 2 Severe Obesity (BMI >=35 and <=39.9). Obesity-related health conditions include the following: dyslipidemias and GERD. Obesity is unchanged. BMI is is above average; BMI management plan is completed. We discussed portion control, increasing exercise and pharmacologic options including phentermine.       ASSESSMENT & PLAN    Diagnoses and all orders for this visit:    1. Morbid (severe) obesity due to excess calories (HCC) (Primary)  Comments:  She will start phentermine 15 mg.  She will follow-up in 1 month.  Orders:  -     phentermine 15 MG capsule; Take 1 capsule by mouth Every Morning.  Dispense: 30 capsule; Refill: 0    2. Vitamin D deficiency  Comments:  - I will refill her  vitamin D supplement  Orders:  -     vitamin D (ERGOCALCIFEROL) 1.25 MG (65825 UT) capsule capsule; Take 1 capsule by mouth 1 (One) Time Per Week.  Dispense: 12 capsule; Refill: 0    3. Candida infection  Comments:  She can continue to take Diflucan as needed.  Orders:  -     fluconazole (Diflucan) 150 MG tablet; Take 1 tablet by mouth 1 (One) Time for 1 dose.  Dispense: 1 tablet; Refill: 0    4. Long term use of drug  Comments:  dwight and isabell updated today  Orders:  -     POC Urine Drug Screen [facility]         Tobacco Use: Medium Risk   • Smoking Tobacco Use: Former   • Smokeless Tobacco Use: Never   • Passive Exposure: Not on file         Follow Up     Return in about 4 weeks (around 11/22/2022).      Patient was given instructions and counseling regarding her condition or for health maintenance advice. Please see specific information pulled into the AVS if appropriate.   I have reviewed information obtained and documented by others and I have confirmed the accuracy of this documented note.    REG Guerra       Transcribed from ambient dictation for REG Guerra by Liz Jacob.  10/25/22   12:31 EDT    Patient or patient representative verbalized consent to the visit recording.  I have personally performed the services described in this document as transcribed by the above individual, and it is both accurate and complete.

## 2022-10-31 ENCOUNTER — TELEPHONE (OUTPATIENT)
Dept: FAMILY MEDICINE CLINIC | Facility: CLINIC | Age: 42
End: 2022-10-31

## 2022-10-31 DIAGNOSIS — Z12.31 SCREENING MAMMOGRAM FOR BREAST CANCER: Primary | ICD-10-CM

## 2022-10-31 NOTE — TELEPHONE ENCOUNTER
----- Message from REG Guerra sent at 10/31/2022  1:45 PM EDT -----  Regarding: FW: Mammogram  Contact: [phone]  Please place order for mammogram  ----- Message -----  From: Luz Elena Harmon MA  Sent: 10/31/2022   1:04 PM EDT  To: REG Guerra  Subject: FW: Mammogram                                    Pt was seen on 10/25/22 and has one month follow up set for 11/30/22. Ok to order or wait till next appt?   ----- Message -----  From: Janki Krueger  Sent: 10/31/2022  11:38 AM EDT  To: [facility]  Subject: Mammogram                                        I forgot when I saw you last that I am due for a mammogram after 12/14/22.

## 2022-11-30 ENCOUNTER — OFFICE VISIT (OUTPATIENT)
Dept: FAMILY MEDICINE CLINIC | Facility: CLINIC | Age: 42
End: 2022-11-30

## 2022-11-30 VITALS
BODY MASS INDEX: 38.55 KG/M2 | WEIGHT: 217.6 LBS | SYSTOLIC BLOOD PRESSURE: 114 MMHG | OXYGEN SATURATION: 96 % | HEART RATE: 93 BPM | DIASTOLIC BLOOD PRESSURE: 67 MMHG | HEIGHT: 63 IN

## 2022-11-30 PROCEDURE — 99213 OFFICE O/P EST LOW 20 MIN: CPT | Performed by: NURSE PRACTITIONER

## 2022-11-30 RX ORDER — PHENTERMINE HYDROCHLORIDE 15 MG/1
15 CAPSULE ORAL EVERY MORNING
Qty: 30 CAPSULE | Refills: 0 | Status: CANCELLED | OUTPATIENT
Start: 2022-11-30

## 2022-11-30 RX ORDER — PHENTERMINE HYDROCHLORIDE 37.5 MG/1
37.5 TABLET ORAL
Qty: 30 TABLET | Refills: 0 | Status: SHIPPED | OUTPATIENT
Start: 2022-11-30 | End: 2022-12-28 | Stop reason: SDUPTHER

## 2022-12-02 RX ORDER — CLOPIDOGREL BISULFATE 75 MG/1
TABLET ORAL
Qty: 90 TABLET | Refills: 3 | Status: SHIPPED | OUTPATIENT
Start: 2022-12-02

## 2022-12-20 ENCOUNTER — TELEMEDICINE (OUTPATIENT)
Dept: FAMILY MEDICINE CLINIC | Facility: CLINIC | Age: 42
End: 2022-12-20

## 2022-12-20 VITALS — WEIGHT: 215 LBS | HEIGHT: 63 IN | BODY MASS INDEX: 38.09 KG/M2

## 2022-12-20 DIAGNOSIS — N39.0 URINARY TRACT INFECTION WITH HEMATURIA, SITE UNSPECIFIED: Primary | ICD-10-CM

## 2022-12-20 DIAGNOSIS — R11.0 NAUSEA: ICD-10-CM

## 2022-12-20 DIAGNOSIS — R31.9 HEMATURIA, UNSPECIFIED TYPE: ICD-10-CM

## 2022-12-20 DIAGNOSIS — R35.0 FREQUENT URINATION: ICD-10-CM

## 2022-12-20 DIAGNOSIS — R31.9 URINARY TRACT INFECTION WITH HEMATURIA, SITE UNSPECIFIED: Primary | ICD-10-CM

## 2022-12-20 LAB
BACTERIA UR QL AUTO: ABNORMAL /HPF
BILIRUB BLD-MCNC: NEGATIVE MG/DL
BILIRUB UR QL STRIP: NEGATIVE
CLARITY UR: ABNORMAL
CLARITY, POC: ABNORMAL
COLOR UR: ABNORMAL
COLOR UR: YELLOW
EXPIRATION DATE: ABNORMAL
GLUCOSE UR STRIP-MCNC: NEGATIVE MG/DL
GLUCOSE UR STRIP-MCNC: NEGATIVE MG/DL
HGB UR QL STRIP.AUTO: ABNORMAL
HYALINE CASTS UR QL AUTO: ABNORMAL /LPF
KETONES UR QL STRIP: ABNORMAL
KETONES UR QL: ABNORMAL
LEUKOCYTE EST, POC: NEGATIVE
LEUKOCYTE ESTERASE UR QL STRIP.AUTO: NEGATIVE
Lab: ABNORMAL
NITRITE UR QL STRIP: NEGATIVE
NITRITE UR-MCNC: NEGATIVE MG/ML
PH UR STRIP.AUTO: 5.5 [PH] (ref 5–8)
PH UR: 5.5 [PH] (ref 5–8)
PROT UR QL STRIP: NEGATIVE
PROT UR STRIP-MCNC: NEGATIVE MG/DL
RBC # UR STRIP: ABNORMAL /HPF
RBC # UR STRIP: ABNORMAL /UL
REF LAB TEST METHOD: ABNORMAL
SP GR UR STRIP: >=1.03 (ref 1–1.03)
SP GR UR: 1.03 (ref 1–1.03)
SQUAMOUS #/AREA URNS HPF: ABNORMAL /HPF
UROBILINOGEN UR QL STRIP: ABNORMAL
UROBILINOGEN UR QL: NORMAL
WBC # UR STRIP: ABNORMAL /HPF

## 2022-12-20 PROCEDURE — 81003 URINALYSIS AUTO W/O SCOPE: CPT | Performed by: NURSE PRACTITIONER

## 2022-12-20 PROCEDURE — 99213 OFFICE O/P EST LOW 20 MIN: CPT | Performed by: NURSE PRACTITIONER

## 2022-12-20 PROCEDURE — 87086 URINE CULTURE/COLONY COUNT: CPT | Performed by: NURSE PRACTITIONER

## 2022-12-20 PROCEDURE — 81001 URINALYSIS AUTO W/SCOPE: CPT | Performed by: NURSE PRACTITIONER

## 2022-12-20 RX ORDER — FLUCONAZOLE 150 MG/1
150 TABLET ORAL ONCE
Qty: 1 TABLET | Refills: 0 | Status: SHIPPED | OUTPATIENT
Start: 2022-12-20 | End: 2022-12-20

## 2022-12-20 RX ORDER — NITROFURANTOIN 25; 75 MG/1; MG/1
100 CAPSULE ORAL 2 TIMES DAILY
Qty: 14 CAPSULE | Refills: 0 | Status: SHIPPED | OUTPATIENT
Start: 2022-12-20 | End: 2022-12-27

## 2022-12-20 RX ORDER — ONDANSETRON 4 MG/1
4 TABLET, FILM COATED ORAL EVERY 8 HOURS PRN
Qty: 30 TABLET | Refills: 0 | Status: SHIPPED | OUTPATIENT
Start: 2022-12-20 | End: 2023-02-22 | Stop reason: SDUPTHER

## 2022-12-20 NOTE — PROGRESS NOTES
"Chief Complaint  BMI 38.0-38.9,adult; Urinary Frequency (Started yesterday with urge to urinate and frequency); and Med Refill    Subjective         Janki Krueger presents to [facility]  History of Present Illness     C/o urinary freq, malodorous, feeling of incomplete bladder empty, body aches, nausea. Denies chills, fever, flank pain.     Obesity-states she has lost a total of 5 lbs from phentermine. She states the only side effects is dry mouth. Denies any other side effects such as HTN or palpitations.       Objective   Vital Signs:   Ht 160 cm (63\")   Wt 97.5 kg (215 lb)   BMI 38.09 kg/m²     Physical Exam   Constitutional: She appears well-developed and well-nourished.   Pulmonary/Chest: Effort normal.   Psychiatric: She has a normal mood and affect.     Result Review :        Assessment and Plan    Diagnoses and all orders for this visit:    1. Urinary tract infection with hematuria, site unspecified (Primary)  -     fluconazole (Diflucan) 150 MG tablet; Take 1 tablet by mouth 1 (One) Time for 1 dose.  Dispense: 1 tablet; Refill: 0  -     nitrofurantoin, macrocrystal-monohydrate, (Macrobid) 100 MG capsule; Take 1 capsule by mouth 2 (Two) Times a Day for 7 days.  Dispense: 14 capsule; Refill: 0  -     POC Urinalysis Dipstick, Automated    2. Nausea  -     ondansetron (Zofran) 4 MG tablet; Take 1 tablet by mouth Every 8 (Eight) Hours As Needed for Nausea or Vomiting.  Dispense: 30 tablet; Refill: 0  -     POC Urinalysis Dipstick, Automated    3. Frequent urination  -     POC Urinalysis Dipstick, Automated    4. Hematuria, unspecified type  -     Urinalysis With Microscopic - Urine, Clean Catch; Future  -     Urine Culture - Urine, Urine, Clean Catch; Future  -     Urinalysis With Microscopic - Urine, Clean Catch  -     Urine Culture - Urine, Urine, Clean Catch        Follow Up   No follow-ups on file.  Patient was given instructions and counseling regarding her condition " or for health maintenance advice. Please see specific information pulled into the AVS if appropriate.     Mode of Visit: Video  Location of patient: home  Location of provider: [facility] clinic  You have chosen to receive care through a telehealth visit.  The patient has signed the video visit consent form.  The visit included audio and video interaction. No technical issues occurred during this visit.

## 2022-12-21 ENCOUNTER — TELEPHONE (OUTPATIENT)
Dept: FAMILY MEDICINE CLINIC | Facility: CLINIC | Age: 42
End: 2022-12-21

## 2022-12-21 LAB — BACTERIA SPEC AEROBE CULT: NO GROWTH

## 2022-12-22 ENCOUNTER — TELEPHONE (OUTPATIENT)
Dept: FAMILY MEDICINE CLINIC | Facility: CLINIC | Age: 42
End: 2022-12-22

## 2022-12-22 DIAGNOSIS — R35.0 URINARY FREQUENCY: Primary | ICD-10-CM

## 2022-12-22 NOTE — TELEPHONE ENCOUNTER
Phoned patient gave her information about KUB being ordered and if she is having any trouble urinating then she will need to go to the ER. She stated that she is not having any trouble urinating it is just urinary frequency.

## 2022-12-28 ENCOUNTER — HOSPITAL ENCOUNTER (OUTPATIENT)
Dept: GENERAL RADIOLOGY | Facility: HOSPITAL | Age: 42
Discharge: HOME | End: 2022-12-28
Admitting: NURSE PRACTITIONER

## 2022-12-28 ENCOUNTER — LAB (OUTPATIENT)
Dept: FAMILY MEDICINE CLINIC | Facility: CLINIC | Age: 42
End: 2022-12-28
Payer: MEDICARE

## 2022-12-28 DIAGNOSIS — R31.9 HEMATURIA, UNSPECIFIED TYPE: ICD-10-CM

## 2022-12-28 DIAGNOSIS — N39.0 URINARY TRACT INFECTION WITH HEMATURIA, SITE UNSPECIFIED: ICD-10-CM

## 2022-12-28 DIAGNOSIS — R31.9 URINARY TRACT INFECTION WITH HEMATURIA, SITE UNSPECIFIED: ICD-10-CM

## 2022-12-28 DIAGNOSIS — R31.9 HEMATURIA, UNSPECIFIED TYPE: Primary | ICD-10-CM

## 2022-12-28 DIAGNOSIS — R35.0 FREQUENT URINATION: ICD-10-CM

## 2022-12-28 DIAGNOSIS — R11.0 NAUSEA: ICD-10-CM

## 2022-12-28 LAB
BILIRUB BLD-MCNC: ABNORMAL MG/DL
CLARITY, POC: ABNORMAL
COLOR UR: ABNORMAL
EXPIRATION DATE: ABNORMAL
GLUCOSE UR STRIP-MCNC: NEGATIVE MG/DL
KETONES UR QL: NEGATIVE
LEUKOCYTE EST, POC: NEGATIVE
Lab: ABNORMAL
NITRITE UR-MCNC: NEGATIVE MG/ML
PH UR: 5.5 [PH] (ref 5–8)
PROT UR STRIP-MCNC: ABNORMAL MG/DL
RBC # UR STRIP: ABNORMAL /UL
SP GR UR: 1.03 (ref 1–1.03)
UROBILINOGEN UR QL: ABNORMAL

## 2022-12-28 PROCEDURE — 81003 URINALYSIS AUTO W/O SCOPE: CPT | Performed by: NURSE PRACTITIONER

## 2022-12-28 PROCEDURE — 74018 RADEX ABDOMEN 1 VIEW: CPT

## 2022-12-28 RX ORDER — PHENTERMINE HYDROCHLORIDE 37.5 MG/1
37.5 TABLET ORAL
Qty: 30 TABLET | Refills: 0 | Status: SHIPPED | OUTPATIENT
Start: 2022-12-28 | End: 2023-01-27 | Stop reason: SDUPTHER

## 2022-12-29 ENCOUNTER — HOSPITAL ENCOUNTER (OUTPATIENT)
Dept: CT IMAGING | Facility: HOSPITAL | Age: 42
Discharge: HOME | End: 2022-12-29
Admitting: NURSE PRACTITIONER

## 2022-12-29 DIAGNOSIS — R10.9 FLANK PAIN: ICD-10-CM

## 2022-12-29 DIAGNOSIS — Z87.442 HISTORY OF KIDNEY STONES: ICD-10-CM

## 2022-12-29 DIAGNOSIS — R10.9 FLANK PAIN: Primary | ICD-10-CM

## 2022-12-29 PROCEDURE — 74176 CT ABD & PELVIS W/O CONTRAST: CPT

## 2023-01-13 ENCOUNTER — TELEPHONE (OUTPATIENT)
Dept: FAMILY MEDICINE CLINIC | Facility: CLINIC | Age: 43
End: 2023-01-13
Payer: MEDICARE

## 2023-01-13 DIAGNOSIS — G89.4 CHRONIC PAIN SYNDROME: ICD-10-CM

## 2023-01-13 RX ORDER — GABAPENTIN 300 MG/1
300 CAPSULE ORAL
Qty: 90 CAPSULE | Refills: 0 | OUTPATIENT
Start: 2023-01-13 | End: 2023-03-30

## 2023-01-13 NOTE — TELEPHONE ENCOUNTER
Gabapentin refill    LV 12/20/22  F/U needs   uds 10/25/22  Cc 10/25/22  Fill 10/17/22      Pt she can f/u w you before you leave. She said that f/u appt wasn't made after her last telehealth appt

## 2023-01-13 NOTE — TELEPHONE ENCOUNTER
----- Message from Janki Krueger sent at 1/13/2023  9:56 AM EST -----  Regarding: Refill and follow up appt for Phentermine  Contact: [phone]  Can I plz have refill on gabapentin. Also, we did telehealth appt last month and my follow up appt for this month was never made. Can I get a follow up appt with u for the end of this month? Everytime I call the office it sends me to someone in [place] and they can only schedule me for months out.

## 2023-01-16 DIAGNOSIS — E78.00 HIGH CHOLESTEROL: ICD-10-CM

## 2023-01-16 RX ORDER — PRAVASTATIN SODIUM 80 MG/1
80 TABLET ORAL DAILY
Qty: 90 TABLET | Refills: 1 | Status: SHIPPED | OUTPATIENT
Start: 2023-01-16

## 2023-01-27 ENCOUNTER — OFFICE VISIT (OUTPATIENT)
Dept: FAMILY MEDICINE CLINIC | Facility: CLINIC | Age: 43
End: 2023-01-27
Payer: MEDICARE

## 2023-01-27 ENCOUNTER — LAB (OUTPATIENT)
Dept: LAB | Facility: HOSPITAL | Age: 43
End: 2023-01-27
Payer: MEDICARE

## 2023-01-27 VITALS
DIASTOLIC BLOOD PRESSURE: 77 MMHG | BODY MASS INDEX: 37.74 KG/M2 | SYSTOLIC BLOOD PRESSURE: 125 MMHG | WEIGHT: 213 LBS | HEART RATE: 84 BPM | OXYGEN SATURATION: 99 % | HEIGHT: 63 IN

## 2023-01-27 DIAGNOSIS — Z13.29 SCREENING FOR THYROID DISORDER: ICD-10-CM

## 2023-01-27 DIAGNOSIS — R11.2 NAUSEA AND VOMITING, UNSPECIFIED VOMITING TYPE: Primary | ICD-10-CM

## 2023-01-27 DIAGNOSIS — E55.9 VITAMIN D DEFICIENCY: ICD-10-CM

## 2023-01-27 DIAGNOSIS — R11.2 NAUSEA AND VOMITING, UNSPECIFIED VOMITING TYPE: ICD-10-CM

## 2023-01-27 DIAGNOSIS — H65.191 ACUTE MIDDLE EAR EFFUSION, RIGHT: ICD-10-CM

## 2023-01-27 DIAGNOSIS — J30.2 SEASONAL ALLERGIES: ICD-10-CM

## 2023-01-27 LAB
ALBUMIN SERPL-MCNC: 4.2 G/DL (ref 3.5–5.2)
ALBUMIN/GLOB SERPL: 1.6 G/DL
ALP SERPL-CCNC: 80 U/L (ref 39–117)
ALT SERPL W P-5'-P-CCNC: 33 U/L (ref 1–33)
AMYLASE SERPL-CCNC: 50 U/L (ref 28–100)
ANION GAP SERPL CALCULATED.3IONS-SCNC: 5 MMOL/L (ref 5–15)
AST SERPL-CCNC: 23 U/L (ref 1–32)
BILIRUB SERPL-MCNC: 0.2 MG/DL (ref 0–1.2)
BUN SERPL-MCNC: 8 MG/DL (ref 6–20)
BUN/CREAT SERPL: 10.5 (ref 7–25)
CALCIUM SPEC-SCNC: 9.1 MG/DL (ref 8.6–10.5)
CHLORIDE SERPL-SCNC: 103 MMOL/L (ref 98–107)
CO2 SERPL-SCNC: 30 MMOL/L (ref 22–29)
CREAT SERPL-MCNC: 0.76 MG/DL (ref 0.57–1)
EGFRCR SERPLBLD CKD-EPI 2021: 99.2 ML/MIN/1.73
GLOBULIN UR ELPH-MCNC: 2.6 GM/DL
GLUCOSE SERPL-MCNC: 97 MG/DL (ref 65–99)
LIPASE SERPL-CCNC: 35 U/L (ref 13–60)
POTASSIUM SERPL-SCNC: 4.3 MMOL/L (ref 3.5–5.2)
PROT SERPL-MCNC: 6.8 G/DL (ref 6–8.5)
SODIUM SERPL-SCNC: 138 MMOL/L (ref 136–145)
TSH SERPL DL<=0.05 MIU/L-ACNC: 1.99 UIU/ML (ref 0.27–4.2)

## 2023-01-27 PROCEDURE — 83690 ASSAY OF LIPASE: CPT

## 2023-01-27 PROCEDURE — 86677 HELICOBACTER PYLORI ANTIBODY: CPT

## 2023-01-27 PROCEDURE — 84443 ASSAY THYROID STIM HORMONE: CPT

## 2023-01-27 PROCEDURE — 82150 ASSAY OF AMYLASE: CPT

## 2023-01-27 PROCEDURE — 99214 OFFICE O/P EST MOD 30 MIN: CPT | Performed by: NURSE PRACTITIONER

## 2023-01-27 PROCEDURE — 80053 COMPREHEN METABOLIC PANEL: CPT

## 2023-01-27 PROCEDURE — 36415 COLL VENOUS BLD VENIPUNCTURE: CPT

## 2023-01-27 RX ORDER — CETIRIZINE HYDROCHLORIDE 10 MG/1
10 TABLET ORAL DAILY
Qty: 90 TABLET | Refills: 1 | OUTPATIENT
Start: 2023-01-27 | End: 2023-03-30

## 2023-01-27 RX ORDER — FLUTICASONE PROPIONATE 50 MCG
2 SPRAY, SUSPENSION (ML) NASAL DAILY
Qty: 9.9 ML | Refills: 5 | Status: SHIPPED | OUTPATIENT
Start: 2023-01-27

## 2023-01-27 NOTE — PROGRESS NOTES
"Chief Complaint  Obesity, Med Refill, and Vomiting    Subjective      Janki Krueger presents to [facility]   History of Present Illness     The patient presents today for a follow-up.    Vomiting - The patient reports that she has been vomiting in the morning. She denies any chance of pregnancy, her  had a vasectomy, and they are not sexually active. She does not drink alcohol. The patient is not vomiting a lot. She states that it is only coffee that comes up in the morning, nothing else. She thought perhaps it was the sugar in her creamer causing the emesis, so she stopped using it, but she still has episodes of emesis. The patient is on Plavix and aspirin. She states that she is not sure if it is an ulcer, and adds that it does not burn. She can not drink coffee or eat jalapenos, because they make her sick. She has not noticed a difference with tomato sauces.    Sinus drainage - She states that she has sinus drainage she can feel. She does not take anything for allergies. She denies any reflux. She states that she used to drink coffee all day long, but she can not any more. The patient states that Zofran has been her best friend.    Obesity - The patient has lost weight. She states that she tried to not take the phentermine for a couple of days to see it it was causing the nausea, but she still felt sick. The patient states that she is tired.    Vitamin D deficiency - The patient states she needs a refill of her vitamin D.      She  has a past medical history of Anemia, Anxiety, Contact lens/glasses fitting, Depression, Heartburn, Obesity, PONV (postoperative nausea and vomiting), and PVD (peripheral vascular disease) ([facility]).     Objective   Vital Signs:   /77 (BP Location: Left arm, Patient Position: Sitting, Cuff Size: Large Adult)   Pulse 84   Ht 160 cm (63\")   Wt 96.6 kg (213 lb)   SpO2 99%   BMI 37.73 kg/m²     Physical Exam  Vitals reviewed. "   Constitutional:       Appearance: Normal appearance. She is well-developed.   HENT:      Right Ear: A middle ear effusion is present.      Ears:      Comments: PE: Fluid behind right TM  Neck:      Thyroid: No thyromegaly.      Vascular: No carotid bruit.   Cardiovascular:      Rate and Rhythm: Normal rate and regular rhythm.      Pulses: Normal pulses.      Heart sounds: Normal heart sounds. No murmur heard.    No friction rub. No gallop.   Pulmonary:      Effort: Pulmonary effort is normal.      Breath sounds: Normal breath sounds. No wheezing or rhonchi.   Musculoskeletal:      Right lower leg: No edema.      Left lower leg: No edema.   Neurological:      Mental Status: She is alert and oriented to person, place, and time.      Cranial Nerves: No cranial nerve deficit.   Psychiatric:         Mood and Affect: Mood and affect normal.         Behavior: Behavior normal.         Thought Content: Thought content normal.         Judgment: Judgment normal.        Result Review :    CMP    CMP 8/24/22 1/27/23   Glucose 91 97   BUN 10 8   Creatinine 0.64 0.76   eGFR 112.6 99.2   Sodium 140 138   Potassium 4.4 4.3   Chloride 103 103   Calcium 8.9 9.1   Total Protein 7.0 6.8   Albumin 4.30 4.2   Globulin 2.7 2.6   Total Bilirubin 0.2 0.2   Alkaline Phosphatase 77 80   AST (SGOT) 16 23   ALT (SGPT) 18 33   Albumin/Globulin Ratio 1.6 1.6   BUN/Creatinine Ratio 15.6 10.5   Anion Gap 10.0 5.0      Comments are available for some flowsheets but are not being displayed.           CBC    CBC 8/24/22   WBC 5.31   RBC 4.64   Hemoglobin 13.9   Hematocrit 42.8   MCV 92.2   MCH 30.0   MCHC 32.5   RDW 12.6   Platelets 245           CBC w/diff    CBC w/Diff 8/24/22   WBC 5.31   RBC 4.64   Hemoglobin 13.9   Hematocrit 42.8   MCV 92.2   MCH 30.0   MCHC 32.5   RDW 12.6   Platelets 245   Neutrophil Rel % 55.1   Immature Granulocyte Rel % 0.2   Lymphocyte Rel % 34.5   Monocyte Rel % 7.7   Eosinophil Rel % 2.1   Basophil Rel % 0.4            Lipid Panel    Lipid Panel 22   Total Cholesterol 146   Triglycerides 69   HDL Cholesterol 38 (A)   VLDL Cholesterol 14   LDL Cholesterol  94   LDL/HDL Ratio 2.48   (A) Abnormal value            TSH    TSH 22   TSH 3.900 1.990                      Past Surgical History:   Procedure Laterality Date   • AORTA FEMORAL BYPASS  2014    REVISION NOVE , FEM POP 2017   •  SECTION     • ENDOMETRIAL ABLATION  16      Family History   Problem Relation Age of Onset   • COPD Father    • Diabetes Father    • Vision loss Maternal Grandmother    • Cancer Paternal Grandmother         Current Outpatient Medications:   •  phentermine (ADIPEX-P) 37.5 MG tablet, Take 1 tablet by mouth Every Morning Before Breakfast., Disp: 30 tablet, Rfl: 0  •  vitamin D (ERGOCALCIFEROL) 1.25 MG (48352 UT) capsule capsule, Take 1 capsule by mouth 1 (One) Time Per Week., Disp: 12 capsule, Rfl: 0  •  amitriptyline (ELAVIL) 10 MG tablet, Take 1 tablet by mouth Every Night., Disp: 90 tablet, Rfl: 1  •  aspirin 81 MG chewable tablet, Chew 81 mg Daily., Disp: , Rfl:   •  CBD (cannabidiol) oral oil, Every 12 (Twelve) Hours., Disp: , Rfl:   •  cetirizine (zyrTEC) 10 MG tablet, Take 1 tablet by mouth Daily., Disp: 90 tablet, Rfl: 1  •  clopidogrel (PLAVIX) 75 MG tablet, TAKE ONE TABLET BY MOUTH EVERY DAY, Disp: 90 tablet, Rfl: 3  •  fluticasone (FLONASE) 50 MCG/ACT nasal spray, 2 sprays into the nostril(s) as directed by provider Daily., Disp: 9.9 mL, Rfl: 5  •  gabapentin (NEURONTIN) 300 MG capsule, Take 1 capsule by mouth every night at bedtime., Disp: 90 capsule, Rfl: 0  •  HYDROcodone-acetaminophen (NORCO) 7.5-325 MG per tablet, Take 1 tablet by mouth Every 8 (Eight) Hours As Needed for Moderate Pain ., Disp: 90 tablet, Rfl: 0  •  Melatonin 3 MG tablet dispersible, Take 1 tablet by mouth every night at bedtime., Disp: , Rfl:   •  methylPREDNISolone (MEDROL) 4 MG dose pack, Take as directed on package instructions.,  Disp: 21 each, Rfl: 0  •  ondansetron (Zofran) 4 MG tablet, Take 1 tablet by mouth Every 8 (Eight) Hours As Needed for Nausea or Vomiting., Disp: 30 tablet, Rfl: 0  •  PARoxetine (PAXIL) 20 MG tablet, Take 1 tablet by mouth Every Morning., Disp: 90 tablet, Rfl: 1  •  pravastatin (PRAVACHOL) 80 MG tablet, Take 1 tablet by mouth Daily., Disp: 90 tablet, Rfl: 1   Assessment and Plan   Diagnoses and all orders for this visit:    1. Nausea and vomiting, unspecified vomiting type (Primary)  Comments:  We will check the patient for H. pylori. Also ordered were an amylase, lipase, TSH, and CMP.  Orders:  -     H.pylori,IgG / IgA Antibodies; Future  -     Amylase; Future  -     Lipase; Future  -     Comprehensive metabolic panel; Future    2. BMI 38.0-38.9,adult  Comments:  increased dose of phentermine to 37.5mg qd.  She will follow-up in 1 month.  Orders:  -     phentermine (ADIPEX-P) 37.5 MG tablet; Take 1 tablet by mouth Every Morning Before Breakfast.  Dispense: 30 tablet; Refill: 0    3. Screening for thyroid disorder  -     TSH; Future    4. Seasonal allergies  Comments:  We will prescribe the patient Zyrtec, Flonase, and a steroid pack.  Orders:  -     cetirizine (zyrTEC) 10 MG tablet; Take 1 tablet by mouth Daily.  Dispense: 90 tablet; Refill: 1  -     fluticasone (FLONASE) 50 MCG/ACT nasal spray; 2 sprays into the nostril(s) as directed by provider Daily.  Dispense: 9.9 mL; Refill: 5    5. Vitamin D deficiency  Comments:  - I will refill her vitamin D supplement  Orders:  -     vitamin D (ERGOCALCIFEROL) 1.25 MG (07848 UT) capsule capsule; Take 1 capsule by mouth 1 (One) Time Per Week.  Dispense: 12 capsule; Refill: 0    6. Acute middle ear effusion, right  -     methylPREDNISolone (MEDROL) 4 MG dose pack; Take as directed on package instructions.  Dispense: 21 each; Refill: 0        Follow Up   Return in about 4 weeks (around 2/24/2023).  Patient was given instructions and counseling regarding her condition or for  health maintenance advice. Please see specific information pulled into the AVS if appropriate.               Transcribed from ambient dictation for REG Guerra by Claire Obrien.  01/27/23   12:45 EST    Patient or patient representative verbalized consent to the visit recording.  I have personally performed the services described in this document as transcribed by the above individual, and it is both accurate and complete.

## 2023-01-30 LAB
H PYLORI IGA SER-ACNC: <9 UNITS (ref 0–8.9)
H PYLORI IGG SER IA-ACNC: 0.21 INDEX VALUE (ref 0–0.79)

## 2023-01-30 RX ORDER — PHENTERMINE HYDROCHLORIDE 37.5 MG/1
37.5 TABLET ORAL
Qty: 30 TABLET | Refills: 0 | Status: SHIPPED | OUTPATIENT
Start: 2023-01-30 | End: 2023-02-22 | Stop reason: SDUPTHER

## 2023-01-30 RX ORDER — ERGOCALCIFEROL 1.25 MG/1
50000 CAPSULE ORAL WEEKLY
Qty: 12 CAPSULE | Refills: 0 | Status: SHIPPED | OUTPATIENT
Start: 2023-01-30

## 2023-01-30 RX ORDER — METHYLPREDNISOLONE 4 MG/1
TABLET ORAL
Qty: 21 EACH | Refills: 0 | Status: SHIPPED | OUTPATIENT
Start: 2023-01-30 | End: 2023-02-22

## 2023-02-22 ENCOUNTER — OFFICE VISIT (OUTPATIENT)
Dept: FAMILY MEDICINE CLINIC | Facility: CLINIC | Age: 43
End: 2023-02-22
Payer: MEDICARE

## 2023-02-22 DIAGNOSIS — Z79.899 LONG TERM USE OF DRUG: ICD-10-CM

## 2023-02-22 DIAGNOSIS — E66.9 OBESITY (BMI 30-39.9): ICD-10-CM

## 2023-02-22 DIAGNOSIS — B37.9 CANDIDA INFECTION: Primary | ICD-10-CM

## 2023-02-22 DIAGNOSIS — N76.4 ABSCESS OF GENITAL LABIA: Primary | ICD-10-CM

## 2023-02-22 DIAGNOSIS — R11.0 NAUSEA: ICD-10-CM

## 2023-02-22 PROCEDURE — 1160F RVW MEDS BY RX/DR IN RCRD: CPT | Performed by: NURSE PRACTITIONER

## 2023-02-22 PROCEDURE — G0439 PPPS, SUBSEQ VISIT: HCPCS | Performed by: NURSE PRACTITIONER

## 2023-02-22 PROCEDURE — 99214 OFFICE O/P EST MOD 30 MIN: CPT | Performed by: NURSE PRACTITIONER

## 2023-02-22 PROCEDURE — 1125F AMNT PAIN NOTED PAIN PRSNT: CPT | Performed by: NURSE PRACTITIONER

## 2023-02-22 PROCEDURE — 1159F MED LIST DOCD IN RCRD: CPT | Performed by: NURSE PRACTITIONER

## 2023-02-22 PROCEDURE — 1170F FXNL STATUS ASSESSED: CPT | Performed by: NURSE PRACTITIONER

## 2023-02-22 RX ORDER — SULFAMETHOXAZOLE AND TRIMETHOPRIM 800; 160 MG/1; MG/1
1 TABLET ORAL 2 TIMES DAILY
Qty: 20 TABLET | Refills: 0 | Status: SHIPPED | OUTPATIENT
Start: 2023-02-22 | End: 2023-03-04

## 2023-02-22 RX ORDER — FLUCONAZOLE 150 MG/1
TABLET ORAL
Qty: 2 TABLET | Refills: 0 | OUTPATIENT
Start: 2023-02-22 | End: 2023-03-30

## 2023-02-22 RX ORDER — PHENTERMINE HYDROCHLORIDE 37.5 MG/1
37.5 TABLET ORAL
Qty: 30 TABLET | Refills: 0 | OUTPATIENT
Start: 2023-02-22 | End: 2023-03-30

## 2023-02-22 RX ORDER — ONDANSETRON 4 MG/1
4 TABLET, FILM COATED ORAL EVERY 8 HOURS PRN
Qty: 30 TABLET | Refills: 2 | Status: SHIPPED | OUTPATIENT
Start: 2023-02-22

## 2023-02-22 NOTE — PROGRESS NOTES
The ABCs of the Annual Wellness Visit  Subsequent Medicare Wellness Visit    Subjective    Janki Krueger is a 42 y.o. female who presents for a Subsequent Medicare Wellness Visit.    The following portions of the patient's history were reviewed and   updated as appropriate: allergies, current medications, past family history, past medical history, past social history, past surgical history and problem list.    Compared to one year ago, the patient feels her physical   health is better.    Compared to one year ago, the patient feels her mental   health is the same.    Recent Hospitalizations:  She was not admitted to the hospital during the last year.       Current Medical Providers:  Patient Care Team:  Esme Balbuena MD as PCP - General (Family Medicine)  Esme Balbuena MD as Consulting Physician (Family Medicine)  Ignacio Junior MD as Consulting Physician (General Surgery)    Outpatient Medications Prior to Visit   Medication Sig Dispense Refill   • amitriptyline (ELAVIL) 10 MG tablet Take 1 tablet by mouth Every Night. 90 tablet 1   • aspirin 81 MG chewable tablet Chew 1 tablet Daily.     • CBD (cannabidiol) oral oil Every 12 (Twelve) Hours.     • cetirizine (zyrTEC) 10 MG tablet Take 1 tablet by mouth Daily. 90 tablet 1   • clopidogrel (PLAVIX) 75 MG tablet TAKE ONE TABLET BY MOUTH EVERY DAY 90 tablet 3   • fluticasone (FLONASE) 50 MCG/ACT nasal spray 2 sprays into the nostril(s) as directed by provider Daily. 9.9 mL 5   • gabapentin (NEURONTIN) 300 MG capsule Take 1 capsule by mouth every night at bedtime. 90 capsule 0   • HYDROcodone-acetaminophen (NORCO) 7.5-325 MG per tablet Take 1 tablet by mouth Every 8 (Eight) Hours As Needed for Moderate Pain . 90 tablet 0   • Melatonin 3 MG tablet dispersible Take 1 tablet by mouth every night at bedtime.     • pravastatin (PRAVACHOL) 80 MG tablet Take 1 tablet by mouth Daily. 90 tablet 1   • vitamin D (ERGOCALCIFEROL) 1.25 MG (40591 UT) capsule capsule Take 1  "capsule by mouth 1 (One) Time Per Week. 12 capsule 0   • methylPREDNISolone (MEDROL) 4 MG dose pack Take as directed on package instructions. 21 each 0   • ondansetron (Zofran) 4 MG tablet Take 1 tablet by mouth Every 8 (Eight) Hours As Needed for Nausea or Vomiting. 30 tablet 0   • PARoxetine (PAXIL) 20 MG tablet Take 1 tablet by mouth Every Morning. 90 tablet 1   • phentermine (ADIPEX-P) 37.5 MG tablet Take 1 tablet by mouth Every Morning Before Breakfast. 30 tablet 0     No facility-administered medications prior to visit.       Opioid medication/s are on active medication list.  and I have evaluated her active treatment plan and pain score trends (see table).  Vitals:    02/22/23 1108   PainSc:   6     I have reviewed the chart for potential of high risk medication and harmful drug interactions in the elderly.            Aspirin is on active medication list. Aspirin use is indicated based on review of current medical condition/s. Pros and cons of this therapy have been discussed today. Benefits of this medication outweigh potential harm.  Patient has been encouraged to continue taking this medication.  .      Patient Active Problem List   Diagnosis   • Peripheral vascular disease (HCC)   • Neuropathic pain of foot, right   • Insomnia   • High cholesterol   • Urticaria   • Idiopathic peripheral neuropathy   • Nausea and vomiting     Advance Care Planning  Advance Directive is not on file.  ACP discussion was held with the patient during this visit. Patient does not have an advance directive, declines further assistance.     Objective    Vitals:    02/22/23 1108 02/22/23 1654   BP: 130/70 130/70   Weight: 96.3 kg (212 lb 3.2 oz)    Height: 160 cm (63\")    PainSc:   6      Estimated body mass index is 37.59 kg/m² as calculated from the following:    Height as of this encounter: 160 cm (63\").    Weight as of this encounter: 96.3 kg (212 lb 3.2 oz).    Class 2 Severe Obesity (BMI >=35 and <=39.9). Obesity-related " health conditions include the following: dyslipidemias. Obesity is improving with treatment. BMI is is above average; BMI management plan is completed. We discussed portion control and increasing exercise.      Does the patient have evidence of cognitive impairment? No          HEALTH RISK ASSESSMENT    Smoking Status:  Social History     Tobacco Use   Smoking Status Former   • Types: Cigarettes   • Quit date:    • Years since quittin.2   • Passive exposure: Never   Smokeless Tobacco Never   Tobacco Comments    quit smoking in      Alcohol Consumption:  Social History     Substance and Sexual Activity   Alcohol Use Not Currently     Fall Risk Screen:    STEADI Fall Risk Assessment was completed, and patient is at LOW risk for falls.Assessment completed on:2023    Depression Screening:  PHQ-2/PHQ-9 Depression Screening 2023   Little Interest or Pleasure in Doing Things 0-->not at all   Feeling Down, Depressed or Hopeless 0-->not at all   PHQ-9: Brief Depression Severity Measure Score 0       Health Habits and Functional and Cognitive Screening:  Functional & Cognitive Status 2023   Do you have difficulty preparing food and eating? No   Do you have difficulty bathing yourself, getting dressed or grooming yourself? No   Do you have difficulty using the toilet? No   Do you have difficulty moving around from place to place? No   Do you have trouble with steps or getting out of a bed or a chair? No   Current Diet Other   Dental Exam Up to date   Eye Exam Up to date   Exercise (times per week) 0 times per week   Current Exercises Include No Regular Exercise   Do you need help using the phone?  -   Are you deaf or do you have serious difficulty hearing?  No   Do you need help with transportation? Yes   Do you need help shopping? No   Do you need help preparing meals?  No   Do you need help with housework?  Yes   Do you need help with laundry? No   Do you need help taking your medications? No   Do  you need help managing money? No   Do you ever drive or ride in a car without wearing a seat belt? No   Have you felt unusual stress, anger or loneliness in the last month? No   Who do you live with? Spouse   If you need help, do you have trouble finding someone available to you? No   Have you been bothered in the last four weeks by sexual problems? No   Do you have difficulty concentrating, remembering or making decisions? Yes       Age-appropriate Screening Schedule:  Refer to the list below for future screening recommendations based on patient's age, sex and/or medical conditions. Orders for these recommended tests are listed in the plan section. The patient has been provided with a written plan.    Health Maintenance   Topic Date Due   • HEPATITIS C SCREENING  Never done   • COVID-19 Vaccine (1) 03/29/2023 (Originally 3/8/1981)   • TDAP/TD VACCINES (1 - Tdap) 03/29/2023 (Originally 9/8/1997)   • INFLUENZA VACCINE  03/31/2023 (Originally 8/1/2022)   • LIPID PANEL  08/24/2023   • ANNUAL WELLNESS VISIT  02/22/2024   • PAP SMEAR  03/17/2025   • Pneumococcal Vaccine 0-64  Aged Out                CMS Preventative Services Quick Reference  Risk Factors Identified During Encounter  Immunizations Discussed/Encouraged: Tdap, Influenza and Hep C antibody  The above risks/problems have been discussed with the patient.  Pertinent information has been shared with the patient in the After Visit Summary.  An After Visit Summary and PPPS were made available to the patient.    Follow Up:   Next Medicare Wellness visit to be scheduled in 1 year.       Additional E&M Note during same encounter follows:  Patient has multiple medical problems which are significant and separately identifiable that require additional work above and beyond the Medicare Wellness Visit.      Chief Complaint  Medicare Wellness-subsequent and Morbid (severe) obesity due to excess calories (HCC) (1 month f/u)    Subjective        HPI  Janki Krueger is  also being seen today for         Memory issues - The patient would like to discontinue gabapentin. She states she can not remember anything. She notes she has been on it for years. She reports she is foggy all the time.    Nausea - The patient needs a refill on Zofran. She states it has been a life saver. She reports she was having trouble with constipation. She states the pain management told her to take 1 stool softener and MiraLAX every day until she started going and then back off to 1 per day. She notes she takes 1 stool softener a day and she straightened herself out. She reports her nausea has gotten better, but it is not gone. She states it started before she started the phentermine. She notes the coffee started making her sick. She denies ever seeing a general surgeon. She denies any abdominal pain. She denies ever having a colonoscopy. She denies any rectal bleeding.    Bilateral ear pain - The patient reports bilateral ear pain. She describes the pain as sharp, stabbing pain. She notes one side of her throat is sore. She denies being exposed to anyone with strep.    Boil - The patient reports the boil has been back for a week. She denies any swelling. She states when she notices it is friction it will rub it. She notes the last one swelled up and hurt to walk. She notes she has not shaved because she is afraid of the boil. She denies changing any soap. She notes she has had them before in the past but it is in a different area this time..    Health maintenance - The patient reports her physical health is better than last year. She states her mental health is about the same. She denies being admitted to the hospital in the past year. She notes she takes an aspirin daily. She denies having an advanced directive or living will. She denies wanting information on it. She denies  palpitations, or blood pressure issues. She notes she does get a dry mouth. She denies incorporating any kind of diet or exercise  "with the phentermine. . She denies ever trying Saxenda or Wegovy because insurance will not cover it.    Objective   Vital Signs:  /70   Ht 160 cm (63\")   Wt 96.3 kg (212 lb 3.2 oz)   BMI 37.59 kg/m²     Physical Exam  Vitals reviewed.   Constitutional:       Appearance: Normal appearance. She is well-developed.   HENT:      Right Ear: Hearing, tympanic membrane, ear canal and external ear normal.      Left Ear: Ear canal and external ear normal. Tenderness present. A middle ear effusion is present.   Neck:      Thyroid: No thyromegaly.      Vascular: No carotid bruit.   Cardiovascular:      Rate and Rhythm: Normal rate and regular rhythm.      Pulses: Normal pulses.      Heart sounds: Normal heart sounds. No murmur heard.    No friction rub. No gallop.   Pulmonary:      Effort: Pulmonary effort is normal.      Breath sounds: Normal breath sounds. No wheezing or rhonchi.   Genitourinary:     Labia:         Right: Tenderness and lesion present.           Comments: 2 lesions palpable under the skin  Musculoskeletal:      Right lower leg: No edema.      Left lower leg: No edema.   Neurological:      Mental Status: She is alert and oriented to person, place, and time.      Cranial Nerves: No cranial nerve deficit.   Psychiatric:         Mood and Affect: Mood and affect normal.         Behavior: Behavior normal.         Thought Content: Thought content normal.         Judgment: Judgment normal.          The following data was reviewed by: REG Guerra on 02/22/2023:  CBC    CBC 8/24/22   WBC 5.31   RBC 4.64   Hemoglobin 13.9   Hematocrit 42.8   MCV 92.2   MCH 30.0   MCHC 32.5   RDW 12.6   Platelets 245           CBC w/diff    CBC w/Diff 8/24/22   WBC 5.31   RBC 4.64   Hemoglobin 13.9   Hematocrit 42.8   MCV 92.2   MCH 30.0   MCHC 32.5   RDW 12.6   Platelets 245   Neutrophil Rel % 55.1   Immature Granulocyte Rel % 0.2   Lymphocyte Rel % 34.5   Monocyte Rel % 7.7   Eosinophil Rel % 2.1   Basophil " Rel % 0.4           Lipid Panel    Lipid Panel 8/24/22   Total Cholesterol 146   Triglycerides 69   HDL Cholesterol 38 (A)   VLDL Cholesterol 14   LDL Cholesterol  94   LDL/HDL Ratio 2.48   (A) Abnormal value            TSH    TSH 8/24/22 1/27/23   TSH 3.900 1.990                      Assessment and Plan   Diagnoses and all orders for this visit:    1. Abscess of genital labia (Primary)  Comments:  prescribed Bactrim DS x 10 days  Orders:  -     sulfamethoxazole-trimethoprim (BACTRIM DS,SEPTRA DS) 800-160 MG per tablet; Take 1 tablet by mouth 2 (Two) Times a Day for 10 days.  Dispense: 20 tablet; Refill: 0    2. Nausea  Comments:  referred to  for EGD due to nausea.   Orders:  -     ondansetron (Zofran) 4 MG tablet; Take 1 tablet by mouth Every 8 (Eight) Hours As Needed for Nausea or Vomiting.  Dispense: 30 tablet; Refill: 2  -     Ambulatory Referral to General Surgery    3. BMI 37.0-37.9, adult  Comments:  she lost 1lb since last visit-discussed adding diet and exercise to develp a routine so once it is discontinued she will not have rebound wt gain  Orders:  -     phentermine (ADIPEX-P) 37.5 MG tablet; Take 1 tablet by mouth Every Morning Before Breakfast.  Dispense: 30 tablet; Refill: 0    4. Long term use of drug  Comments:  discussed taper for neurontin-she is to decrease to 300mg qod x 2 wks then stop-if she has withdrawals she can do longer taper.    5. Obesity (BMI 30-39.9)                    Follow Up   Return in about 3 months (around 5/22/2023).  Patient was given instructions and counseling regarding her condition or for health maintenance advice. Please see specific information pulled into the AVS if appropriate.       Transcribed from ambient dictation for REG Guerra by Analilia Gallegos.  02/22/23   12:24 EST    Patient or patient representative verbalized consent to the visit recording.  I have personally performed the services described in this document as transcribed by the  above individual, and it is both accurate and complete.

## 2023-03-03 DIAGNOSIS — F41.9 ANXIETY AND DEPRESSION: ICD-10-CM

## 2023-03-03 DIAGNOSIS — F32.A ANXIETY AND DEPRESSION: ICD-10-CM

## 2023-03-03 RX ORDER — PAROXETINE HYDROCHLORIDE 20 MG/1
20 TABLET, FILM COATED ORAL EVERY MORNING
Qty: 90 TABLET | Refills: 1 | OUTPATIENT
Start: 2023-03-03 | End: 2023-03-30

## 2023-03-04 PROBLEM — G60.9 IDIOPATHIC PERIPHERAL NEUROPATHY: Status: ACTIVE | Noted: 2023-03-04

## 2023-03-06 ENCOUNTER — OFFICE VISIT (OUTPATIENT)
Dept: SURGERY | Facility: CLINIC | Age: 43
End: 2023-03-06
Payer: MEDICARE

## 2023-03-06 ENCOUNTER — PREP FOR SURGERY (OUTPATIENT)
Dept: OTHER | Facility: HOSPITAL | Age: 43
End: 2023-03-06
Payer: MEDICARE

## 2023-03-06 VITALS — WEIGHT: 210 LBS | HEIGHT: 63 IN | RESPIRATION RATE: 16 BRPM | BODY MASS INDEX: 37.21 KG/M2

## 2023-03-06 DIAGNOSIS — R11.2 NAUSEA AND VOMITING, UNSPECIFIED VOMITING TYPE: Primary | ICD-10-CM

## 2023-03-06 PROCEDURE — 99203 OFFICE O/P NEW LOW 30 MIN: CPT | Performed by: SURGERY

## 2023-03-06 NOTE — PROGRESS NOTES
Inpatient History and Physical Surgical Orders    Preadmission Location:   Preadmission Time:  Facility:  Surgery Date:  Surgery Time:  Preadmission Test date:     Chief Complaint  Outpatient History and Physical / Surgical Orders    Primary Care Provider: Esme Balbuena MD    Referring Provider: Caitlin Mohamud,*    Subjective      Patient Name: Janki Krueger : 1980    HPI  The patient is a 42-year-old female who presents with persistent complaints of nausea and vomiting.  She is not really having a lot of pain but is having persistent nausea and takes Zofran frequently.    Past History:  Medical History: has a past medical history of Anemia, Anxiety, Contact lens/glasses fitting, Depression, Heartburn, Nausea, Obesity, PONV (postoperative nausea and vomiting), and PVD (peripheral vascular disease) ([facility]).   Surgical History: has a past surgical history that includes Aorta Femoral Bypass (2014);  section; and Endometrial ablation (16).   Family History: family history includes COPD in her father; Cancer in her paternal grandmother; Diabetes in her father; Vision loss in her maternal grandmother.   Social History: reports that she quit smoking about 7 years ago. Her smoking use included cigarettes. She has never been exposed to tobacco smoke. She has never used smokeless tobacco. She reports that she does not currently use alcohol. She reports that she does not currently use drugs.  Allergies: Morphine       Current Outpatient Medications:   •  amitriptyline (ELAVIL) 10 MG tablet, Take 1 tablet by mouth Every Night., Disp: 90 tablet, Rfl: 1  •  aspirin 81 MG chewable tablet, Chew 1 tablet Daily., Disp: , Rfl:   •  CBD (cannabidiol) oral oil, Every 12 (Twelve) Hours., Disp: , Rfl:   •  cetirizine (zyrTEC) 10 MG tablet, Take 1 tablet by mouth Daily., Disp: 90 tablet, Rfl: 1  •  clopidogrel (PLAVIX) 75 MG tablet, TAKE ONE TABLET BY MOUTH EVERY DAY, Disp: 90 tablet, Rfl: 3  •  fluconazole  "(Diflucan) 150 MG tablet, Take one tab PO x1 if symptoms do not resolve can repeat dose in 72 hours., Disp: 2 tablet, Rfl: 0  •  fluticasone (FLONASE) 50 MCG/ACT nasal spray, 2 sprays into the nostril(s) as directed by provider Daily., Disp: 9.9 mL, Rfl: 5  •  gabapentin (NEURONTIN) 300 MG capsule, Take 1 capsule by mouth every night at bedtime., Disp: 90 capsule, Rfl: 0  •  HYDROcodone-acetaminophen (NORCO) 7.5-325 MG per tablet, Take 1 tablet by mouth Every 8 (Eight) Hours As Needed for Moderate Pain ., Disp: 90 tablet, Rfl: 0  •  Melatonin 3 MG tablet dispersible, Take 1 tablet by mouth every night at bedtime., Disp: , Rfl:   •  ondansetron (Zofran) 4 MG tablet, Take 1 tablet by mouth Every 8 (Eight) Hours As Needed for Nausea or Vomiting., Disp: 30 tablet, Rfl: 2  •  PARoxetine (PAXIL) 20 MG tablet, Take 1 tablet by mouth Every Morning., Disp: 90 tablet, Rfl: 1  •  phentermine (ADIPEX-P) 37.5 MG tablet, Take 1 tablet by mouth Every Morning Before Breakfast., Disp: 30 tablet, Rfl: 0  •  pravastatin (PRAVACHOL) 80 MG tablet, Take 1 tablet by mouth Daily., Disp: 90 tablet, Rfl: 1  •  vitamin D (ERGOCALCIFEROL) 1.25 MG (57319 UT) capsule capsule, Take 1 capsule by mouth 1 (One) Time Per Week., Disp: 12 capsule, Rfl: 0       Objective   Vital Signs:   Resp 16   Ht 160 cm (62.99\")   Wt 95.3 kg (210 lb)   BMI 37.21 kg/m²       Physical Exam  Vitals and nursing note reviewed.   Constitutional:       Appearance: Normal appearance. The patient is well-developed.   Cardiovascular:      Rate and Rhythm: Normal rate and regular rhythm.   Pulmonary:      Effort: Pulmonary effort is normal.      Breath sounds: Normal air entry.   Abdominal:      General: Bowel sounds are normal.      Palpations: Abdomen is soft.      Skin:     General: Skin is warm and dry.   Neurological:      Mental Status: The patient is alert and oriented to person, place, and time.      Motor: Motor function is intact.   Psychiatric:         Mood and " Affect: Mood normal.       Result Review :               Assessment and Plan   Diagnoses and all orders for this visit:    1. Nausea and vomiting, unspecified vomiting type (Primary)    We will schedule her for an EGD and I am also going to get a right upper quadrant ultrasound to make sure she does not have gallstones.  I have described the procedure to her as well as the risk and benefits and she is agreeable to proceeding.    I  Ignacio Junior MD  03/06/2023

## 2023-03-09 VITALS
SYSTOLIC BLOOD PRESSURE: 130 MMHG | DIASTOLIC BLOOD PRESSURE: 70 MMHG | WEIGHT: 212.2 LBS | HEIGHT: 63 IN | BODY MASS INDEX: 37.6 KG/M2

## 2023-03-15 DIAGNOSIS — G43.809 OTHER MIGRAINE WITHOUT STATUS MIGRAINOSUS, NOT INTRACTABLE: ICD-10-CM

## 2023-03-15 RX ORDER — AMITRIPTYLINE HYDROCHLORIDE 10 MG/1
10 TABLET, FILM COATED ORAL NIGHTLY
Qty: 30 TABLET | Refills: 0 | Status: SHIPPED | OUTPATIENT
Start: 2023-03-15

## 2023-03-15 NOTE — TELEPHONE ENCOUNTER
"   Caller: Janki Krueger \"Nalini\"    Relationship: Self    Best call back number: [phone]    Requested Prescriptions:   Requested Prescriptions     Pending Prescriptions Disp Refills   • amitriptyline (ELAVIL) 10 MG tablet 90 tablet 1     Sig: Take 1 tablet by mouth Every Night.        Pharmacy where request should be sent: [facility] [place] [place] [facility] [place] [facility] [address] [place] [address] - [phone] [facility] [phone] FX     Additional details provided by patient:       THE PATIENT IS OUT OF THIS MEDICATION. THE PATIENT HAS AN APPOINTMENT 4/6/23.     Does the patient have less than a 3 day supply:  [x] Yes  [] No    Would you like a call back once the refill request has been completed: [x] Yes [] No    If the office needs to give you a call back, can they leave a voicemail: [x] Yes [] No    Patience Carreno Rep   03/15/23 10:23 EDT      "

## 2023-03-21 ENCOUNTER — HOSPITAL ENCOUNTER (OUTPATIENT)
Dept: MAMMOGRAPHY | Facility: HOSPITAL | Age: 43
Discharge: HOME | End: 2023-03-21
Admitting: NURSE PRACTITIONER
Payer: MEDICARE

## 2023-03-21 DIAGNOSIS — Z12.31 SCREENING MAMMOGRAM FOR BREAST CANCER: ICD-10-CM

## 2023-03-21 PROCEDURE — 77063 BREAST TOMOSYNTHESIS BI: CPT

## 2023-03-21 PROCEDURE — 77067 SCR MAMMO BI INCL CAD: CPT

## 2023-03-30 PROCEDURE — U0004 COV-19 TEST NON-CDC HGH THRU: HCPCS | Performed by: NURSE PRACTITIONER

## 2023-03-30 PROCEDURE — U0005 INFEC AGEN DETEC AMPLI PROBE: HCPCS | Performed by: NURSE PRACTITIONER

## 2023-04-06 ENCOUNTER — OFFICE VISIT (OUTPATIENT)
Dept: FAMILY MEDICINE CLINIC | Facility: CLINIC | Age: 43
End: 2023-04-06
Payer: MEDICARE

## 2023-04-06 VITALS
RESPIRATION RATE: 18 BRPM | OXYGEN SATURATION: 100 % | HEIGHT: 63 IN | WEIGHT: 208 LBS | BODY MASS INDEX: 36.86 KG/M2 | DIASTOLIC BLOOD PRESSURE: 68 MMHG | SYSTOLIC BLOOD PRESSURE: 122 MMHG | TEMPERATURE: 96.6 F | HEART RATE: 75 BPM

## 2023-04-06 DIAGNOSIS — R05.1 ACUTE COUGH: ICD-10-CM

## 2023-04-06 DIAGNOSIS — E66.9 OBESITY (BMI 30-39.9): ICD-10-CM

## 2023-04-06 DIAGNOSIS — F32.A ANXIETY AND DEPRESSION: ICD-10-CM

## 2023-04-06 DIAGNOSIS — Z76.89 ENCOUNTER TO ESTABLISH CARE: Primary | ICD-10-CM

## 2023-04-06 DIAGNOSIS — G43.709 CHRONIC MIGRAINE WITHOUT AURA WITHOUT STATUS MIGRAINOSUS, NOT INTRACTABLE: ICD-10-CM

## 2023-04-06 DIAGNOSIS — F41.9 ANXIETY AND DEPRESSION: ICD-10-CM

## 2023-04-06 DIAGNOSIS — J06.9 UPPER RESPIRATORY TRACT INFECTION, UNSPECIFIED TYPE: ICD-10-CM

## 2023-04-06 DIAGNOSIS — I73.9 PAD (PERIPHERAL ARTERY DISEASE): ICD-10-CM

## 2023-04-06 RX ORDER — PREDNISONE 20 MG/1
20 TABLET ORAL 2 TIMES DAILY
Qty: 10 TABLET | Refills: 0 | Status: SHIPPED | OUTPATIENT
Start: 2023-04-06

## 2023-04-06 RX ORDER — FLUCONAZOLE 150 MG/1
150 TABLET ORAL ONCE
Qty: 1 TABLET | Refills: 0 | Status: SHIPPED | OUTPATIENT
Start: 2023-04-06 | End: 2023-04-06

## 2023-04-06 RX ORDER — PAROXETINE HYDROCHLORIDE 20 MG/1
20 TABLET, FILM COATED ORAL EVERY MORNING
Qty: 90 TABLET | Refills: 1 | Status: SHIPPED | OUTPATIENT
Start: 2023-04-06

## 2023-04-06 RX ORDER — BROMPHENIRAMINE MALEATE, PSEUDOEPHEDRINE HYDROCHLORIDE, AND DEXTROMETHORPHAN HYDROBROMIDE 2; 30; 10 MG/5ML; MG/5ML; MG/5ML
5 SYRUP ORAL 4 TIMES DAILY PRN
Qty: 118 ML | Refills: 0 | Status: SHIPPED | OUTPATIENT
Start: 2023-04-06

## 2023-04-06 RX ORDER — ALBUTEROL SULFATE 90 UG/1
2 AEROSOL, METERED RESPIRATORY (INHALATION) EVERY 4 HOURS PRN
Qty: 18 G | Refills: 0 | Status: SHIPPED | OUTPATIENT
Start: 2023-04-06

## 2023-04-06 RX ORDER — CEFDINIR 300 MG/1
300 CAPSULE ORAL 2 TIMES DAILY
Qty: 20 CAPSULE | Refills: 0 | Status: SHIPPED | OUTPATIENT
Start: 2023-04-06

## 2023-04-06 NOTE — PRE-PROCEDURE INSTRUCTIONS
Arrival-time of 0930.    Must have a fully-licensed  for transportation home post-procedure.      Nothing to eat or drink after midnight.    Hold all medications the morning of the procedure, instead bring all prescribed medications to the hospital.    Patient is on Plavix, was not told to hold medication prior to procedure.  I have reached out to the office and will call back with that information or any further instructions.    Patient verbalized understanding of instructions.    Marylu Gordon RN     4/6 @ 1030:  Dr. Junior does not want patient to hold Plavix.  Communicated that information to the patient.  Verbalized understanding, no further questions at this time.    Marylu Gordon RN

## 2023-04-06 NOTE — PROGRESS NOTES
"Chief Complaint  Establishing care for management of anxiety/depression, cough, peripheral artery disease    Subjective         Janki Krueger is a 42 y.o. female who presents to [facility]    42 years old comes to the clinic today to establish care.    Anxiety/depression; patient came off of Paxil 2 weeks ago but reports that she does not feel right and not doing well without Paxil.  She would like to go back on Paxil.    Obesity; has been trying hard with diet and exercise to lose weight    Migraines are controlled on amitriptyline    Patient is following up with vascular and pain management for peripheral artery disease, pain.    Complaining of 1 week of active congestion, cough, sinus drainage.  Reports no ear pain/headache/vision changes but does report that cough gets worse at nighttime.    Denies any chest pain or shortness of breath.  Patient is a former smoker, denies any history of asthma/COPD.    Objective   Vital Signs:   Vitals:    04/06/23 1327   BP: 122/68   BP Location: Left arm   Patient Position: Sitting   Cuff Size: Adult   Pulse: 75   Resp: 18   Temp: 96.6 °F (35.9 °C)   TempSrc: Temporal   SpO2: 100%   Weight: 94.3 kg (208 lb)   Height: 160 cm (62.99\")      Body mass index is 36.86 kg/m².   Wt Readings from Last 3 Encounters:   04/06/23 94.3 kg (208 lb)   03/30/23 95.3 kg (210 lb)   03/06/23 95.3 kg (210 lb)      BP Readings from Last 3 Encounters:   04/06/23 122/68   03/30/23 112/68   03/04/23 130/74        Patient Care Team:  Esme Balbuena MD as PCP - General (Family Medicine)  Esme Balbuena MD as Consulting Physician (Family Medicine)  Ignacio Junior MD as Consulting Physician (General Surgery)     Physical Exam  Vitals reviewed.   Constitutional:       Appearance: Normal appearance. She is well-developed.   HENT:      Head: Normocephalic and atraumatic.      Right Ear: External ear normal.      Left Ear: External ear normal.      Mouth/Throat:      " Pharynx: Pharyngeal swelling present. No oropharyngeal exudate or posterior oropharyngeal erythema.   Eyes:      Conjunctiva/sclera: Conjunctivae normal.      Pupils: Pupils are equal, round, and reactive to light.   Cardiovascular:      Rate and Rhythm: Normal rate and regular rhythm.      Heart sounds: No murmur heard.    No friction rub. No gallop.   Pulmonary:      Effort: Pulmonary effort is normal.      Breath sounds: Wheezing present. No rhonchi.      Comments: Scattered wheezing  Abdominal:      General: Bowel sounds are normal. There is no distension.      Palpations: Abdomen is soft.      Tenderness: There is no abdominal tenderness.   Skin:     General: Skin is warm and dry.   Neurological:      Mental Status: She is alert and oriented to person, place, and time.      Cranial Nerves: No cranial nerve deficit.   Psychiatric:         Mood and Affect: Mood and affect normal.         Behavior: Behavior normal.         Thought Content: Thought content normal.         Judgment: Judgment normal.                       Assessment and Plan   Diagnoses and all orders for this visit:    1. Encounter to establish care (Primary)    2. Anxiety and depression  Comments:  We will restart Paxil, to start with 10 mg, titrating up protocol discussed.  Orders:  -     PARoxetine (Paxil) 20 MG tablet; Take 1 tablet by mouth Every Morning.  Dispense: 90 tablet; Refill: 1    3. Obesity (BMI 30-39.9)  Comments:  Lifestyle modifications, daily exercise and healthy diet recommended    4. Chronic migraine without aura without status migrainosus, not intractable  Comments:  Chronic, controlled on prophylactic amitriptyline 10 mg every night.    5. PAD (peripheral artery disease)  Comments:  Continue with vascular and pain management.  Continue with aspirin/Plavix/atorvastatin at current dose    6. Upper respiratory tract infection, unspecified type  Comments:  We will treat with prednisone/cefdinir.  Albuterol/cough medication  prescribed  Orders:  -     cefdinir (OMNICEF) 300 MG capsule; Take 1 capsule by mouth 2 (Two) Times a Day.  Dispense: 20 capsule; Refill: 0  -     predniSONE (DELTASONE) 20 MG tablet; Take 1 tablet by mouth 2 (Two) Times a Day.  Dispense: 10 tablet; Refill: 0  -     fluconazole (Diflucan) 150 MG tablet; Take 1 tablet by mouth 1 (One) Time for 1 dose.  Dispense: 1 tablet; Refill: 0  -     albuterol sulfate  (90 Base) MCG/ACT inhaler; Inhale 2 puffs Every 4 (Four) Hours As Needed for Wheezing.  Dispense: 18 g; Refill: 0  -     brompheniramine-pseudoephedrine-DM 30-2-10 MG/5ML syrup; Take 5 mL by mouth 4 (Four) Times a Day As Needed for Congestion or Cough.  Dispense: 118 mL; Refill: 0    7. Acute cough  -     albuterol sulfate  (90 Base) MCG/ACT inhaler; Inhale 2 puffs Every 4 (Four) Hours As Needed for Wheezing.  Dispense: 18 g; Refill: 0  -     brompheniramine-pseudoephedrine-DM 30-2-10 MG/5ML syrup; Take 5 mL by mouth 4 (Four) Times a Day As Needed for Congestion or Cough.  Dispense: 118 mL; Refill: 0          Tobacco Use: Medium Risk   • Smoking Tobacco Use: Former   • Smokeless Tobacco Use: Never   • Passive Exposure: Never            Follow Up   Return in about 3 months (around 7/6/2023) for Next scheduled follow up.  Patient was given instructions and counseling regarding her condition or for health maintenance advice. Please see specific information pulled into the AVS if appropriate.

## 2023-04-07 ENCOUNTER — ANESTHESIA EVENT (OUTPATIENT)
Dept: GASTROENTEROLOGY | Facility: HOSPITAL | Age: 43
End: 2023-04-07
Payer: MEDICARE

## 2023-04-10 ENCOUNTER — ANESTHESIA (OUTPATIENT)
Dept: GASTROENTEROLOGY | Facility: HOSPITAL | Age: 43
End: 2023-04-10
Payer: MEDICARE

## 2023-04-10 ENCOUNTER — HOSPITAL ENCOUNTER (OUTPATIENT)
Facility: HOSPITAL | Age: 43
Setting detail: HOSPITAL OUTPATIENT SURGERY
Discharge: HOME | End: 2023-04-10
Attending: SURGERY | Admitting: SURGERY
Payer: MEDICARE

## 2023-04-10 VITALS
DIASTOLIC BLOOD PRESSURE: 88 MMHG | RESPIRATION RATE: 15 BRPM | WEIGHT: 207.23 LBS | SYSTOLIC BLOOD PRESSURE: 132 MMHG | TEMPERATURE: 98.6 F | BODY MASS INDEX: 36.72 KG/M2 | OXYGEN SATURATION: 98 % | HEART RATE: 78 BPM

## 2023-04-10 DIAGNOSIS — R11.2 NAUSEA AND VOMITING, UNSPECIFIED VOMITING TYPE: ICD-10-CM

## 2023-04-10 LAB — B-HCG UR QL: NEGATIVE

## 2023-04-10 PROCEDURE — 88305 TISSUE EXAM BY PATHOLOGIST: CPT | Performed by: SURGERY

## 2023-04-10 PROCEDURE — 25010000002 PROPOFOL 10 MG/ML EMULSION: Performed by: NURSE ANESTHETIST, CERTIFIED REGISTERED

## 2023-04-10 PROCEDURE — 81025 URINE PREGNANCY TEST: CPT | Performed by: SURGERY

## 2023-04-10 PROCEDURE — 88342 IMHCHEM/IMCYTCHM 1ST ANTB: CPT | Performed by: SURGERY

## 2023-04-10 RX ORDER — LIDOCAINE HYDROCHLORIDE 20 MG/ML
INJECTION, SOLUTION EPIDURAL; INFILTRATION; INTRACAUDAL; PERINEURAL AS NEEDED
Status: DISCONTINUED | OUTPATIENT
Start: 2023-04-10 | End: 2023-04-10 | Stop reason: SURG

## 2023-04-10 RX ORDER — SODIUM CHLORIDE, SODIUM LACTATE, POTASSIUM CHLORIDE, CALCIUM CHLORIDE 600; 310; 30; 20 MG/100ML; MG/100ML; MG/100ML; MG/100ML
30 INJECTION, SOLUTION INTRAVENOUS CONTINUOUS
Status: DISCONTINUED | OUTPATIENT
Start: 2023-04-10 | End: 2023-04-10 | Stop reason: HOSPADM

## 2023-04-10 RX ORDER — ONDANSETRON 2 MG/ML
4 INJECTION INTRAMUSCULAR; INTRAVENOUS ONCE AS NEEDED
Status: DISCONTINUED | OUTPATIENT
Start: 2023-04-10 | End: 2023-04-10 | Stop reason: HOSPADM

## 2023-04-10 RX ORDER — SODIUM CHLORIDE, SODIUM LACTATE, POTASSIUM CHLORIDE, CALCIUM CHLORIDE 600; 310; 30; 20 MG/100ML; MG/100ML; MG/100ML; MG/100ML
INJECTION, SOLUTION INTRAVENOUS CONTINUOUS PRN
Status: DISCONTINUED | OUTPATIENT
Start: 2023-04-10 | End: 2023-04-10 | Stop reason: SURG

## 2023-04-10 RX ORDER — ONDANSETRON 4 MG/1
4 TABLET, FILM COATED ORAL ONCE AS NEEDED
Status: DISCONTINUED | OUTPATIENT
Start: 2023-04-10 | End: 2023-04-10 | Stop reason: HOSPADM

## 2023-04-10 RX ORDER — PROPOFOL 10 MG/ML
VIAL (ML) INTRAVENOUS AS NEEDED
Status: DISCONTINUED | OUTPATIENT
Start: 2023-04-10 | End: 2023-04-10 | Stop reason: SURG

## 2023-04-10 RX ADMIN — SODIUM CHLORIDE, POTASSIUM CHLORIDE, SODIUM LACTATE AND CALCIUM CHLORIDE 30 ML/HR: 600; 310; 30; 20 INJECTION, SOLUTION INTRAVENOUS at 10:18

## 2023-04-10 RX ADMIN — SODIUM CHLORIDE, POTASSIUM CHLORIDE, SODIUM LACTATE AND CALCIUM CHLORIDE: 600; 310; 30; 20 INJECTION, SOLUTION INTRAVENOUS at 10:56

## 2023-04-10 RX ADMIN — PROPOFOL 145 MCG/KG/MIN: 10 INJECTION, EMULSION INTRAVENOUS at 11:00

## 2023-04-10 RX ADMIN — PROPOFOL 100 MG: 10 INJECTION, EMULSION INTRAVENOUS at 11:00

## 2023-04-10 RX ADMIN — LIDOCAINE HYDROCHLORIDE 60 MG: 20 INJECTION, SOLUTION EPIDURAL; INFILTRATION; INTRACAUDAL; PERINEURAL at 10:59

## 2023-04-10 NOTE — H&P
[facility]   HISTORY AND PHYSICAL    Patient Name: Janki Krueger  : 1980  MRN: [id number]  Primary Care Physician:  Esme Balbuena MD  Date of admission: 4/10/2023    Subjective   Subjective     Chief Complaint: Nausea and vomiting    HPI:    Janki Krueger is a 42 y.o. female who presents with persistent nausea and vomiting.    Review of Systems   Respiratory: Negative for shortness of breath.    Cardiovascular: Negative for chest pain.   Gastrointestinal: Positive for nausea and vomiting.       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 Otherwise pertinent FHx was reviewed and not pertinent to current issue.    Social History:  reports that she quit smoking about 7 years ago. Her smoking use included cigarettes. She has never been exposed to tobacco smoke. She has never used smokeless tobacco. She reports that she does not currently use drugs. She reports that she does not drink alcohol.    Home Medications:  CBD, HYDROcodone-acetaminophen, Melatonin, PARoxetine, albuterol sulfate HFA, amitriptyline, aspirin, brompheniramine-pseudoephedrine-DM, cefdinir, clopidogrel, fluticasone, ondansetron, pravastatin, predniSONE, and vitamin D    Allergies:  Allergies   Allergen Reactions   • Morphine  Mental Status Change and Unknown - High Severity       Objective    Objective     Vitals:        Physical Exam  HENT:      Head: Normocephalic.   Cardiovascular:      Rate and Rhythm: Normal rate.   Pulmonary:      Effort: Pulmonary effort is normal.   Abdominal:      Palpations: Abdomen is soft.   Musculoskeletal:         General: Normal range of motion.      Cervical back: Normal range of motion.   Skin:     General: Skin is warm.   Neurological:      General: No focal deficit present.      Mental Status: She is alert.   Psychiatric:         Mood and Affect: Mood normal.         Result Review    Result Review:  I have personally reviewed the results from the time of this admission to 4/10/2023 09:57 EDT and agree with these findings:  []  Laboratory  []  Microbiology  []  Radiology  []  EKG/Telemetry   []  Cardiology/Vascular   []  Pathology  []  Old records  []  Other:  Most notable findings include:     Assessment & Plan   Assessment / Plan     Brief Patient Summary:  Janki Krueger is a 42 y.o. female who presents with persistent nausea and vomiting.    Active Hospital Problems:  Active Hospital Problems    Diagnosis    • **Nausea and vomiting        Plan:   We will proceed with an EGD.  I described the procedure to her as well as the risk and benefits and she is agreeable to proceeding.    DVT prophylaxis:  No DVT prophylaxis order currently exists.    CODE STATUS:         Admission Status:  I believe this patient meets outpatient status.    Electronically signed by Ignacio Junior MD, 04/10/23, 9:57 AM EDT.

## 2023-04-10 NOTE — ANESTHESIA PREPROCEDURE EVALUATION
Anesthesia Evaluation     Patient summary reviewed and Nursing notes reviewed                Airway   Mallampati: I  TM distance: >3 FB  Neck ROM: full  No difficulty expected  Dental      Pulmonary - negative pulmonary ROS and normal exam    breath sounds clear to auscultation  Cardiovascular - normal exam    Rhythm: regular  Rate: normal    (+) PVD, hyperlipidemia,       Neuro/Psych  (+) numbness, psychiatric history,    GI/Hepatic/Renal/Endo    (+) obesity,       Musculoskeletal (-) negative ROS    Abdominal    Substance History - negative use     OB/GYN negative ob/gyn ROS         Other                        Anesthesia Plan    ASA 2     general     intravenous induction     Anesthetic plan, risks, benefits, and alternatives have been provided, discussed and informed consent has been obtained with: patient.        CODE STATUS:

## 2023-04-10 NOTE — ANESTHESIA POSTPROCEDURE EVALUATION
Patient: Janki Krueger    Procedure Summary     Date: 04/10/23 Room / Location: [facility] ENDOSCOPY 3 / [facility] ENDOSCOPY    Anesthesia Start: 1056 Anesthesia Stop: 1113    Procedure: ESOPHAGOGASTRODUODENOSCOPY with biopsies Diagnosis:       Nausea and vomiting, unspecified vomiting type      (Nausea and vomiting, unspecified vomiting type [R11.2])    Surgeons: Ignacio Junior MD Provider: Roman Harrison MD    Anesthesia Type: general ASA Status: 2          Anesthesia Type: general    Vitals  Vitals Value Taken Time   /67 04/10/23 1116   Temp 36.8 °C (98.3 °F) 04/10/23 1110   Pulse 82 04/10/23 1117   Resp 15 04/10/23 1115   SpO2 97 % 04/10/23 1117   Vitals shown include unvalidated device data.        Post Anesthesia Care and Evaluation    Patient location during evaluation: bedside  Patient participation: complete - patient participated  Level of consciousness: awake  Pain management: adequate    Airway patency: patent  PONV Status: none  Cardiovascular status: acceptable  Respiratory status: acceptable  Hydration status: acceptable    Comments: An Anesthesiologist personally participated in the most demanding procedures (including induction and emergence if applicable) in the anesthesia plan, monitored the course of anesthesia administration at frequent intervals and remained physically present and available for immediate diagnosis and treatment of emergencies.

## 2023-04-11 ENCOUNTER — HOSPITAL ENCOUNTER (OUTPATIENT)
Dept: ULTRASOUND IMAGING | Facility: HOSPITAL | Age: 43
Discharge: HOME | End: 2023-04-11
Admitting: SURGERY
Payer: MEDICARE

## 2023-04-11 DIAGNOSIS — R11.2 NAUSEA AND VOMITING, UNSPECIFIED VOMITING TYPE: ICD-10-CM

## 2023-04-11 PROCEDURE — 76705 ECHO EXAM OF ABDOMEN: CPT

## 2023-04-12 LAB
CYTO UR: NORMAL
LAB AP CASE REPORT: NORMAL
LAB AP CLINICAL INFORMATION: NORMAL
LAB AP SPECIAL STAINS: NORMAL
PATH REPORT.FINAL DX SPEC: NORMAL
PATH REPORT.GROSS SPEC: NORMAL

## 2023-04-13 DIAGNOSIS — G43.809 OTHER MIGRAINE WITHOUT STATUS MIGRAINOSUS, NOT INTRACTABLE: ICD-10-CM

## 2023-04-13 RX ORDER — AMITRIPTYLINE HYDROCHLORIDE 10 MG/1
10 TABLET, FILM COATED ORAL NIGHTLY
Qty: 30 TABLET | Refills: 0 | Status: SHIPPED | OUTPATIENT
Start: 2023-04-13

## 2023-05-10 DIAGNOSIS — G43.809 OTHER MIGRAINE WITHOUT STATUS MIGRAINOSUS, NOT INTRACTABLE: ICD-10-CM

## 2023-05-10 RX ORDER — AMITRIPTYLINE HYDROCHLORIDE 10 MG/1
TABLET, FILM COATED ORAL
Qty: 30 TABLET | Refills: 0 | OUTPATIENT
Start: 2023-05-10

## 2023-05-15 DIAGNOSIS — G43.809 OTHER MIGRAINE WITHOUT STATUS MIGRAINOSUS, NOT INTRACTABLE: ICD-10-CM

## 2023-05-15 RX ORDER — AMITRIPTYLINE HYDROCHLORIDE 10 MG/1
10 TABLET, FILM COATED ORAL NIGHTLY
Qty: 90 TABLET | Refills: 1 | Status: SHIPPED | OUTPATIENT
Start: 2023-05-15

## 2023-05-15 NOTE — TELEPHONE ENCOUNTER
----- Message from Janki Krueger sent at 5/15/2023  1:14 PM EDT -----  Regarding: Amitrypteline refill  Contact: [phone]  Can I please get a refill on my amitrypteline? The pharmacy sent a request last week and now I'm out of my medicine for tonight. Thank You

## 2023-06-15 ENCOUNTER — OFFICE VISIT (OUTPATIENT)
Dept: FAMILY MEDICINE CLINIC | Facility: CLINIC | Age: 43
End: 2023-06-15
Payer: MEDICARE

## 2023-06-15 VITALS
RESPIRATION RATE: 16 BRPM | TEMPERATURE: 97 F | HEIGHT: 63 IN | WEIGHT: 208.6 LBS | OXYGEN SATURATION: 98 % | SYSTOLIC BLOOD PRESSURE: 110 MMHG | HEART RATE: 99 BPM | BODY MASS INDEX: 36.96 KG/M2 | DIASTOLIC BLOOD PRESSURE: 74 MMHG

## 2023-06-15 DIAGNOSIS — R05.1 ACUTE COUGH: ICD-10-CM

## 2023-06-15 DIAGNOSIS — J02.9 PHARYNGITIS, UNSPECIFIED ETIOLOGY: Primary | ICD-10-CM

## 2023-06-15 DIAGNOSIS — J40 BRONCHITIS: ICD-10-CM

## 2023-06-15 DIAGNOSIS — J06.9 UPPER RESPIRATORY TRACT INFECTION, UNSPECIFIED TYPE: ICD-10-CM

## 2023-06-15 LAB
EXPIRATION DATE: NORMAL
INTERNAL CONTROL: NORMAL
Lab: NORMAL
S PYO AG THROAT QL: NEGATIVE

## 2023-06-15 PROCEDURE — 99213 OFFICE O/P EST LOW 20 MIN: CPT | Performed by: NURSE PRACTITIONER

## 2023-06-15 PROCEDURE — 1159F MED LIST DOCD IN RCRD: CPT | Performed by: NURSE PRACTITIONER

## 2023-06-15 PROCEDURE — 1160F RVW MEDS BY RX/DR IN RCRD: CPT | Performed by: NURSE PRACTITIONER

## 2023-06-15 PROCEDURE — 87880 STREP A ASSAY W/OPTIC: CPT | Performed by: NURSE PRACTITIONER

## 2023-06-15 RX ORDER — FLUCONAZOLE 150 MG/1
150 TABLET ORAL ONCE
Qty: 2 TABLET | Refills: 0 | Status: SHIPPED | OUTPATIENT
Start: 2023-06-15 | End: 2023-06-15

## 2023-06-15 RX ORDER — AMOXICILLIN AND CLAVULANATE POTASSIUM 875; 125 MG/1; MG/1
1 TABLET, FILM COATED ORAL 2 TIMES DAILY
Qty: 20 TABLET | Refills: 0 | Status: SHIPPED | OUTPATIENT
Start: 2023-06-15

## 2023-06-15 RX ORDER — ALBUTEROL SULFATE 90 UG/1
2 AEROSOL, METERED RESPIRATORY (INHALATION) EVERY 4 HOURS PRN
Qty: 18 G | Refills: 0 | Status: SHIPPED | OUTPATIENT
Start: 2023-06-15

## 2023-06-15 RX ORDER — DEXTROMETHORPHAN HYDROBROMIDE AND PROMETHAZINE HYDROCHLORIDE 15; 6.25 MG/5ML; MG/5ML
5 SYRUP ORAL 4 TIMES DAILY PRN
Qty: 240 ML | Refills: 0 | Status: SHIPPED | OUTPATIENT
Start: 2023-06-15

## 2023-06-15 RX ORDER — METHYLPREDNISOLONE 4 MG/1
TABLET ORAL
Qty: 21 TABLET | Refills: 0 | Status: SHIPPED | OUTPATIENT
Start: 2023-06-15

## 2023-06-15 NOTE — PROGRESS NOTES
Chief Complaint  Cough (Covid home test negative), Nasal Congestion, URI, Sore Throat, and Sinusitis    Subjective        Janki Krueger presents to [facility]  History of Present Illness  Covid at home is negative.    Nasal congestion, Uri and sore throat and green drainage.  Cough  Associated symptoms include a sore throat. Pertinent negatives include no chest pain, fever or shortness of breath.   URI   Associated symptoms include coughing and a sore throat. Pertinent negatives include no chest pain.   Sore Throat   Associated symptoms include coughing. Pertinent negatives include no shortness of breath.   Sinusitis  Associated symptoms include coughing and a sore throat. Pertinent negatives include no shortness of breath.     The following portions of the patient's history were personally reviewed and updated as appropriate: allergies, current medications, past medical history, past surgical history, past family history, and past social history.     Body mass index is 36.96 kg/m².           Past History:    Medical History: has a past medical history of Anemia, Anxiety, Contact lens/glasses fitting, Depression, Heartburn, Hyperlipidemia, Nausea, Neuropathy, Obesity, PONV (postoperative nausea and vomiting), and PVD (peripheral vascular disease).     Surgical History: has a past surgical history that includes Aorta Femoral Bypass (2014);  section; Endometrial ablation (16); and Esophagogastroduodenoscopy (N/A, 4/10/2023).     Family History: family history includes COPD in her father; Cancer in her father and paternal grandmother; Diabetes in her father; Hypertension in her father; Vision loss in her maternal grandmother.     Social History: reports that she has been smoking cigarettes. She has never been exposed to tobacco smoke. She has never used smokeless tobacco. She reports that she does not currently use drugs. She reports that she does not drink  alcohol.    Allergies: Morphine          Current Outpatient Medications:     albuterol sulfate  (90 Base) MCG/ACT inhaler, Inhale 2 puffs Every 4 (Four) Hours As Needed for Wheezing., Disp: 18 g, Rfl: 0    amitriptyline (ELAVIL) 10 MG tablet, Take 1 tablet by mouth Every Night., Disp: 90 tablet, Rfl: 1    aspirin 81 MG chewable tablet, Chew 1 tablet Daily., Disp: , Rfl:     brompheniramine-pseudoephedrine-DM 30-2-10 MG/5ML syrup, Take 5 mL by mouth 4 (Four) Times a Day As Needed for Congestion or Cough., Disp: 118 mL, Rfl: 0    CBD (cannabidiol) oral oil, Every 12 (Twelve) Hours., Disp: , Rfl:     clopidogrel (PLAVIX) 75 MG tablet, TAKE ONE TABLET BY MOUTH EVERY DAY, Disp: 90 tablet, Rfl: 3    fluticasone (FLONASE) 50 MCG/ACT nasal spray, 2 sprays into the nostril(s) as directed by provider Daily., Disp: 9.9 mL, Rfl: 5    HYDROcodone-acetaminophen (NORCO) 7.5-325 MG per tablet, Take 1 tablet by mouth Every 8 (Eight) Hours As Needed for Moderate Pain ., Disp: 90 tablet, Rfl: 0    Melatonin 3 MG tablet dispersible, Take 1 tablet by mouth every night at bedtime., Disp: , Rfl:     ondansetron (Zofran) 4 MG tablet, Take 1 tablet by mouth Every 8 (Eight) Hours As Needed for Nausea or Vomiting., Disp: 30 tablet, Rfl: 2    PARoxetine (Paxil) 20 MG tablet, Take 1 tablet by mouth Every Morning., Disp: 90 tablet, Rfl: 1    pravastatin (PRAVACHOL) 80 MG tablet, Take 1 tablet by mouth Daily., Disp: 90 tablet, Rfl: 1    vitamin D (ERGOCALCIFEROL) 1.25 MG (78634 UT) capsule capsule, Take 1 capsule by mouth 1 (One) Time Per Week., Disp: 12 capsule, Rfl: 0    amoxicillin-clavulanate (AUGMENTIN) 875-125 MG per tablet, Take 1 tablet by mouth 2 (Two) Times a Day., Disp: 20 tablet, Rfl: 0    fluconazole (DIFLUCAN) 150 MG tablet, Take 1 tablet by mouth 1 (One) Time for 1 dose. Repeat in 4 days if needed., Disp: 2 tablet, Rfl: 0    methylPREDNISolone (MEDROL) 4 MG dose pack, Take as directed on package instructions., Disp: 21  "tablet, Rfl: 0    promethazine-dextromethorphan (PROMETHAZINE-DM) 6.25-15 MG/5ML syrup, Take 5 mL by mouth 4 (Four) Times a Day As Needed for Cough., Disp: 240 mL, Rfl: 0    Medications Discontinued During This Encounter   Medication Reason    cefdinir (OMNICEF) 300 MG capsule *Therapy completed    predniSONE (DELTASONE) 20 MG tablet *Therapy completed    albuterol sulfate  (90 Base) MCG/ACT inhaler *Therapy completed         Review of Systems   Constitutional:  Negative for fever.   HENT:  Positive for sore throat.    Respiratory:  Positive for cough. Negative for shortness of breath.    Cardiovascular:  Negative for chest pain, palpitations and leg swelling.   Neurological:  Negative for dizziness, weakness, numbness and headache.      Objective         Vitals:    06/15/23 0949   BP: 110/74   BP Location: Right arm   Patient Position: Sitting   Cuff Size: Adult   Pulse: 99   Resp: 16   Temp: 97 °F (36.1 °C)   TempSrc: Temporal   SpO2: 98%   Weight: 94.6 kg (208 lb 9.6 oz)   Height: 160 cm (62.99\")     Body mass index is 36.96 kg/m².         Physical Exam  Vitals reviewed.   Constitutional:       Appearance: Normal appearance. She is well-developed.   HENT:      Head: Normocephalic and atraumatic.      Mouth/Throat:      Pharynx: No oropharyngeal exudate.   Eyes:      Conjunctiva/sclera: Conjunctivae normal.      Pupils: Pupils are equal, round, and reactive to light.   Cardiovascular:      Rate and Rhythm: Normal rate and regular rhythm.      Heart sounds: Normal heart sounds. No murmur heard.    No friction rub. No gallop.   Pulmonary:      Effort: Pulmonary effort is normal.      Breath sounds: Normal breath sounds. No wheezing or rhonchi.   Skin:     General: Skin is warm and dry.   Neurological:      Mental Status: She is alert and oriented to person, place, and time.   Psychiatric:         Mood and Affect: Mood and affect normal.         Behavior: Behavior normal.         Thought Content: Thought " content normal.         Judgment: Judgment normal.           Result Review :               Assessment and Plan     Diagnoses and all orders for this visit:    1. Pharyngitis, unspecified etiology (Primary)  -     POC Rapid Strep A  -     amoxicillin-clavulanate (AUGMENTIN) 875-125 MG per tablet; Take 1 tablet by mouth 2 (Two) Times a Day.  Dispense: 20 tablet; Refill: 0    2. Bronchitis  -     methylPREDNISolone (MEDROL) 4 MG dose pack; Take as directed on package instructions.  Dispense: 21 tablet; Refill: 0  -     promethazine-dextromethorphan (PROMETHAZINE-DM) 6.25-15 MG/5ML syrup; Take 5 mL by mouth 4 (Four) Times a Day As Needed for Cough.  Dispense: 240 mL; Refill: 0  -     fluconazole (DIFLUCAN) 150 MG tablet; Take 1 tablet by mouth 1 (One) Time for 1 dose. Repeat in 4 days if needed.  Dispense: 2 tablet; Refill: 0    3. Upper respiratory tract infection, unspecified type  Comments:  We will treat with prednisone/cefdinir.  Albuterol/cough medication prescribed  Orders:  -     albuterol sulfate  (90 Base) MCG/ACT inhaler; Inhale 2 puffs Every 4 (Four) Hours As Needed for Wheezing.  Dispense: 18 g; Refill: 0    4. Acute cough  -     albuterol sulfate  (90 Base) MCG/ACT inhaler; Inhale 2 puffs Every 4 (Four) Hours As Needed for Wheezing.  Dispense: 18 g; Refill: 0        IF not better will let us know and will get a chest xray.      Follow Up     Return for Next scheduled follow up.    Patient was given instructions and counseling regarding her condition or for health maintenance advice. Please see specific information pulled into the AVS if appropriate.

## 2023-07-19 ENCOUNTER — TELEPHONE (OUTPATIENT)
Dept: VASCULAR SURGERY | Facility: HOSPITAL | Age: 43
End: 2023-07-19
Payer: MEDICARE

## 2023-07-19 NOTE — TELEPHONE ENCOUNTER
"  Caller: Janki Krueger \"Nalini\"    Relationship to patient: Self    Best call back number: [phone]     Type of visit: 1YR FOLLOW WITH TESTING     Requested date: AUGUST      Additional notes:PATIENT WOULD LIKE A CALL BACK TO SCHEDULE            "

## 2023-07-28 ENCOUNTER — HOSPITAL ENCOUNTER (OUTPATIENT)
Dept: CT IMAGING | Facility: HOSPITAL | Age: 43
Discharge: HOME | End: 2023-07-28
Admitting: STUDENT IN AN ORGANIZED HEALTH CARE EDUCATION/TRAINING PROGRAM
Payer: MEDICARE

## 2023-07-28 DIAGNOSIS — J32.9 RECURRENT SINUSITIS: ICD-10-CM

## 2023-07-28 DIAGNOSIS — E78.00 HIGH CHOLESTEROL: ICD-10-CM

## 2023-07-28 DIAGNOSIS — R09.81 SINUS CONGESTION: ICD-10-CM

## 2023-07-28 PROCEDURE — 70486 CT MAXILLOFACIAL W/O DYE: CPT

## 2023-07-28 RX ORDER — PRAVASTATIN SODIUM 80 MG/1
80 TABLET ORAL DAILY
Qty: 90 TABLET | Refills: 1 | OUTPATIENT
Start: 2023-07-28

## 2023-07-31 DIAGNOSIS — E78.00 HIGH CHOLESTEROL: ICD-10-CM

## 2023-07-31 RX ORDER — PRAVASTATIN SODIUM 80 MG/1
80 TABLET ORAL DAILY
Qty: 90 TABLET | Refills: 1 | Status: SHIPPED | OUTPATIENT
Start: 2023-07-31

## 2023-08-30 ENCOUNTER — HOSPITAL ENCOUNTER (OUTPATIENT)
Dept: CARDIOLOGY | Facility: HOSPITAL | Age: 43
Discharge: HOME | End: 2023-08-30
Payer: MEDICARE

## 2023-08-30 ENCOUNTER — OFFICE VISIT (OUTPATIENT)
Dept: VASCULAR SURGERY | Facility: HOSPITAL | Age: 43
End: 2023-08-30
Payer: MEDICARE

## 2023-08-30 VITALS
TEMPERATURE: 97.5 F | SYSTOLIC BLOOD PRESSURE: 118 MMHG | DIASTOLIC BLOOD PRESSURE: 78 MMHG | RESPIRATION RATE: 16 BRPM | HEART RATE: 93 BPM | OXYGEN SATURATION: 99 %

## 2023-08-30 DIAGNOSIS — I73.9 CLAUDICATION: ICD-10-CM

## 2023-08-30 DIAGNOSIS — I70.213 ATHEROSCLEROSIS OF NATIVE ARTERIES OF EXTREMITIES WITH INTERMITTENT CLAUDICATION, BILATERAL LEGS: Primary | ICD-10-CM

## 2023-08-30 LAB
BH CV LOWER ARTERIAL LEFT ABI RATIO: 0.8
BH CV LOWER ARTERIAL LEFT CALF RATIO: 0.8
BH CV LOWER ARTERIAL LEFT DORSALIS PEDIS SYS MAX: 114
BH CV LOWER ARTERIAL LEFT GREAT TOE SYS MAX: 54
BH CV LOWER ARTERIAL LEFT LOW THIGH RATIO: 0.8
BH CV LOWER ARTERIAL LEFT LOW THIGH SYS MAX: 115
BH CV LOWER ARTERIAL LEFT POPLITEAL SYS MAX: 115
BH CV LOWER ARTERIAL LEFT POST TIBIAL SYS MAX: 112
BH CV LOWER ARTERIAL LEFT TBI RATIO: 0.4
BH CV LOWER ARTERIAL RIGHT ABI RATIO: 1
BH CV LOWER ARTERIAL RIGHT CALF RATIO: 1
BH CV LOWER ARTERIAL RIGHT DORSALIS PEDIS SYS MAX: 137
BH CV LOWER ARTERIAL RIGHT GREAT TOE SYS MAX: 59
BH CV LOWER ARTERIAL RIGHT LOW THIGH RATIO: 0.9
BH CV LOWER ARTERIAL RIGHT LOW THIGH SYS MAX: 121
BH CV LOWER ARTERIAL RIGHT POPLITEAL SYS MAX: 141
BH CV LOWER ARTERIAL RIGHT POST TIBIAL SYS MAX: 144
BH CV LOWER ARTERIAL RIGHT TBI RATIO: 0.4
UPPER ARTERIAL LEFT ARM BRACHIAL SYS MAX: 118 MMHG
UPPER ARTERIAL RIGHT ARM BRACHIAL SYS MAX: 131 MMHG

## 2023-08-30 PROCEDURE — 93923 UPR/LXTR ART STDY 3+ LVLS: CPT

## 2023-08-30 NOTE — PROGRESS NOTES
[facility] Office Follow Up Note     Date of Encounter: 08/30/2023     MRN Number: [id number]  Name: Janki Krueger  Phone Number: [phone]     Referred By: Juan Barnett APRN  PCP: Esme Balbuena MD    Chief Complaint:    No chief complaint on file.      Subjective      History of Present Illness:    Janki Krueger is a 42 y.o. female [place] for annual follow-up with a arterial Doppler performed today.  She continues to have occasional claudication symptoms however states they are manageable.  She has resumed smoking after the recent loss of her father.  She continues to take Plavix and aspirin daily.  No other complaints at this time    Review of Systems:  ROS  Review of Systems   Constitutional: Negative.   HENT: Negative.    Cardiovascular: Negative.    Respiratory: Negative.    Skin: Negative.    Musculoskeletal: Negative.    Gastrointestinal: Negative.    Neurological: Negative.    Psychiatric/Behavioral: Negative.      I have reviewed the following portions of the patient's history: problem list, current medications, allergies, past surgical history, past medical history, past social history, past family history, and ROS and confirm it's accurate.    Allergies:  Allergies   Allergen Reactions    Morphine Mental Status Change and Unknown - High Severity       Medications:      Current Outpatient Medications:     albuterol sulfate  (90 Base) MCG/ACT inhaler, Inhale 2 puffs Every 4 (Four) Hours As Needed for Wheezing., Disp: 18 g, Rfl: 0    amitriptyline (ELAVIL) 10 MG tablet, Take 1 tablet by mouth Every Night., Disp: 90 tablet, Rfl: 1    amoxicillin-clavulanate (AUGMENTIN) 875-125 MG per tablet, Take 1 tablet by mouth 2 (Two) Times a Day., Disp: 20 tablet, Rfl: 0    aspirin 81 MG chewable tablet, Chew 1 tablet Daily., Disp: , Rfl:     azithromycin (Zithromax Z-Christophe) 250 MG tablet, Take 2 tablets by mouth on day 1, then 1 tablet daily on days 2-5, Disp: 6 tablet,  Rfl: 0    CBD (cannabidiol) oral oil, Every 12 (Twelve) Hours., Disp: , Rfl:     clopidogrel (PLAVIX) 75 MG tablet, TAKE ONE TABLET BY MOUTH EVERY DAY, Disp: 90 tablet, Rfl: 3    fluticasone (FLONASE) 50 MCG/ACT nasal spray, 2 sprays into the nostril(s) as directed by provider Daily., Disp: 9.9 mL, Rfl: 5    HYDROcodone-acetaminophen (NORCO) 7.5-325 MG per tablet, Take 1 tablet by mouth Every 8 (Eight) Hours As Needed for Moderate Pain ., Disp: 90 tablet, Rfl: 0    Melatonin 3 MG tablet dispersible, Take 1 tablet by mouth every night at bedtime., Disp: , Rfl:     methylPREDNISolone (MEDROL) 4 MG dose pack, Take as directed on package instructions. (Patient not taking: Reported on 7/6/2023), Disp: 21 tablet, Rfl: 0    montelukast (Singulair) 10 MG tablet, Take 1 tablet by mouth Every Night., Disp: 90 tablet, Rfl: 0    naltrexone-bupropion ER (CONTRAVE) 8-90 MG tablet, Wk 1: 1 tab daily, Wk 2: 1 tab twice a day, Wk 3: 2 tabs in AM, 1 tab in PM, Wk 4: 2 tabs twice a day, Maintenance dose: 2 tabs twice daily., Disp: 120 tablet, Rfl: 0    ondansetron (Zofran) 4 MG tablet, Take 1 tablet by mouth Every 8 (Eight) Hours As Needed for Nausea or Vomiting., Disp: 30 tablet, Rfl: 2    PARoxetine (Paxil) 20 MG tablet, Take 1 tablet by mouth Every Morning., Disp: 90 tablet, Rfl: 1    pravastatin (PRAVACHOL) 80 MG tablet, Take 1 tablet by mouth Daily., Disp: 90 tablet, Rfl: 1    predniSONE (DELTASONE) 20 MG tablet, Take 1 tablet by mouth Daily., Disp: 5 tablet, Rfl: 0    promethazine-dextromethorphan (PROMETHAZINE-DM) 6.25-15 MG/5ML syrup, Take 5 mL by mouth 4 (Four) Times a Day As Needed for Cough. 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    PVD (peripheral vascular disease)     SEES        Past Surgical History:   Procedure Laterality Date    AORTA FEMORAL BYPASS  2014    REVISION NOV2016, FEM POP 2017     SECTION      ENDOMETRIAL ABLATION  16    ENDOSCOPY N/A 4/10/2023    Procedure: ESOPHAGOGASTRODUODENOSCOPY with biopsies;  Surgeon: Ignacio Junior MD;  Location: [facility] ENDOSCOPY;  Service: General;  Laterality: N/A;  normal EGD        Social History     Socioeconomic History    Marital status:    Tobacco Use    Smoking status: Some Days     Types: Cigarettes     Last attempt to quit: 2016     Years since quittin.6     Passive exposure: Never    Smokeless tobacco: Never    Tobacco comments:     quit smoking in    Vaping Use    Vaping Use: Never used   Substance and Sexual Activity    Alcohol use: Never    Drug use: Never    Sexual activity: Yes     Partners: Male     Birth control/protection: Vasectomy     Comment:  had vasectomy 16 yrs ago        Family History   Problem Relation Age of Onset    COPD Father     Diabetes Father     Cancer Father         Plasmablastic lymphoma    Hypertension Father     Vision loss Maternal Grandmother     Cancer Paternal Grandmother        Objective     Physical Exam:  Vitals:    23 1432 23 1434   BP: 131/78 118/78   BP Location: Right arm Left arm   Patient Position: Lying Lying   Cuff Size: Adult Adult   Pulse: 93    Resp: 16    Temp: 97.5 øF (36.4 øC)    TempSrc: Infrared    SpO2: 99%       There is no height or weight on file to calculate BMI.    Physical Exam  Physical Exam  Constitutional:       Appearance: Normal appearance.   HENT:      Head: Normocephalic.   Cardiovascular:      Rate and Rhythm: Normal rate.      Pulses: Normal pulses.   Pulmonary:       Effort: Pulmonary effort is normal.   Musculoskeletal:         General: Normal range of motion.      Cervical back: Normal range of motion.   Skin:     General: Skin is warm and dry.      Capillary Refill: Capillary refill takes less than 2 seconds.   Neurological:      General: No focal deficit present.      Mental Status: Alert and oriented to person, place, and time.   Psychiatric:         Mood and Affect: Mood normal.         Behavior: Behavior normal.    Imaging/Labs:  I have reviewed the preliminary results of the arterial Doppler performed today.  The Doppler reveals ABIs of 1.0 on the right and 0.8 on the left, there is a slight difference in the left toe brachial index when compared to the previous study.    Assessment / Plan      Assessment / Plan:  Diagnoses and all orders for this visit:    1. Atherosclerosis of native arteries of extremities with intermittent claudication, bilateral legs (HCC)  (Primary)    Ms. Krueger continues to remain stable with intermittent claudication both by symptoms and the arterial Doppler performed today.  She takes Plavix and aspirin daily.  We will continue with conservative therapy and follow-up with an arterial Doppler in 1 year.  I have explained that should she have any additional concerns or worsening signs or symptoms we will follow-up sooner.    I have answered all of their questions and they are in agreement with the plan at this time. Thank you for allowing me to participate in your patient's care.          Follow Up:   No follow-ups on file.   Or sooner for any further concerns or worsening sign and symptoms. If unable to reach us in the office please dial 911 or go to the nearest emergency department.      Juan Barnett APRKARYNA  [facility]

## 2023-10-04 ENCOUNTER — OFFICE VISIT (OUTPATIENT)
Dept: FAMILY MEDICINE CLINIC | Facility: CLINIC | Age: 43
End: 2023-10-04
Payer: MEDICARE

## 2023-10-04 VITALS
DIASTOLIC BLOOD PRESSURE: 72 MMHG | SYSTOLIC BLOOD PRESSURE: 124 MMHG | TEMPERATURE: 98 F | HEART RATE: 98 BPM | WEIGHT: 211.4 LBS | OXYGEN SATURATION: 99 % | HEIGHT: 63 IN | BODY MASS INDEX: 37.46 KG/M2

## 2023-10-04 DIAGNOSIS — R09.81 SINUS CONGESTION: ICD-10-CM

## 2023-10-04 DIAGNOSIS — F41.9 ANXIETY AND DEPRESSION: Primary | ICD-10-CM

## 2023-10-04 DIAGNOSIS — Z11.59 NEED FOR HEPATITIS C SCREENING TEST: ICD-10-CM

## 2023-10-04 DIAGNOSIS — J30.2 SEASONAL ALLERGIES: ICD-10-CM

## 2023-10-04 DIAGNOSIS — E66.9 OBESITY (BMI 35.0-39.9 WITHOUT COMORBIDITY): ICD-10-CM

## 2023-10-04 DIAGNOSIS — Z12.11 ENCOUNTER FOR SCREENING FOR MALIGNANT NEOPLASM OF COLON: ICD-10-CM

## 2023-10-04 DIAGNOSIS — F32.A ANXIETY AND DEPRESSION: Primary | ICD-10-CM

## 2023-10-04 DIAGNOSIS — R73.01 IMPAIRED FASTING BLOOD SUGAR: ICD-10-CM

## 2023-10-04 PROCEDURE — 1159F MED LIST DOCD IN RCRD: CPT | Performed by: STUDENT IN AN ORGANIZED HEALTH CARE EDUCATION/TRAINING PROGRAM

## 2023-10-04 PROCEDURE — 99213 OFFICE O/P EST LOW 20 MIN: CPT | Performed by: STUDENT IN AN ORGANIZED HEALTH CARE EDUCATION/TRAINING PROGRAM

## 2023-10-04 PROCEDURE — 1160F RVW MEDS BY RX/DR IN RCRD: CPT | Performed by: STUDENT IN AN ORGANIZED HEALTH CARE EDUCATION/TRAINING PROGRAM

## 2023-10-04 RX ORDER — MONTELUKAST SODIUM 10 MG/1
10 TABLET ORAL NIGHTLY
Qty: 90 TABLET | Refills: 0 | Status: SHIPPED | OUTPATIENT
Start: 2023-10-04

## 2023-10-04 RX ORDER — PAROXETINE 30 MG/1
30 TABLET, FILM COATED ORAL EVERY MORNING
Qty: 90 TABLET | Refills: 1 | Status: SHIPPED | OUTPATIENT
Start: 2023-10-04

## 2023-10-04 NOTE — PROGRESS NOTES
"Chief Complaint  Following up on anxiety and depression    Subjective         Janki Krueger is a 43 y.o. female who presents to [facility]    43 years old comes to the clinic today to follow-up.    Anxiety/depression; patient is on Paxil 20 mg, due to season change she would like to increase Paxil as patient does have some seasonal anxiety and depression playing a role specially around fall and spring season.    Patient is maintaining her weight after using phentermine, does not report significant weight gain since stopping.    Reports no other acute concerns, 12+ review of systems are unremarkable        Objective   Vital Signs:   Vitals:    10/04/23 1248   BP: 124/72   Pulse: 98   Temp: 98 °F (36.7 °C)   SpO2: 99%   Weight: 95.9 kg (211 lb 6.4 oz)   Height: 160 cm (63\")      Body mass index is 37.45 kg/m².   Wt Readings from Last 3 Encounters:   10/04/23 95.9 kg (211 lb 6.4 oz)   07/06/23 95.1 kg (209 lb 11.2 oz)   06/15/23 94.6 kg (208 lb 9.6 oz)      BP Readings from Last 3 Encounters:   10/04/23 124/72   08/30/23 118/78   07/06/23 132/88        Patient Care Team:  Esme Balbuena MD as PCP - General (Family Medicine)  Esme Balbuena MD as Consulting Physician (Family Medicine)  Ignacio Junior MD as Consulting Physician (General Surgery)     Physical Exam  Vitals reviewed.   Constitutional:       Appearance: Normal appearance. She is well-developed.   HENT:      Head: Normocephalic and atraumatic.      Right Ear: External ear normal.      Left Ear: External ear normal.      Mouth/Throat:      Pharynx: No oropharyngeal exudate.   Eyes:      Conjunctiva/sclera: Conjunctivae normal.      Pupils: Pupils are equal, round, and reactive to light.   Cardiovascular:      Rate and Rhythm: Normal rate and regular rhythm.      Heart sounds: No murmur heard.    No friction rub. No gallop.   Pulmonary:      Effort: Pulmonary effort is normal.      Breath sounds: Normal breath sounds. No " wheezing or rhonchi.   Abdominal:      General: Bowel sounds are normal. There is no distension.      Palpations: Abdomen is soft.      Tenderness: There is no abdominal tenderness.   Skin:     General: Skin is warm and dry.   Neurological:      Mental Status: She is alert and oriented to person, place, and time.      Cranial Nerves: No cranial nerve deficit.   Psychiatric:         Mood and Affect: Mood and affect normal.         Behavior: Behavior normal.         Thought Content: Thought content normal.         Judgment: Judgment normal.                     Assessment and Plan   Diagnoses and all orders for this visit:    1. Anxiety and depression (Primary)  Comments:  We will increase Paxil, patient to call if no changes or any worsening.  May consider psych if needed  Orders:  -     PARoxetine (Paxil) 30 MG tablet; Take 1 tablet by mouth Every Morning.  Dispense: 90 tablet; Refill: 1  -     TSH Rfx On Abnormal To Free T4; Future  -     CBC & Differential; Future  -     Comprehensive Metabolic Panel; Future  -     Hemoglobin A1c; Future  -     Lipid Panel; Future  -     Urinalysis With Microscopic - Urine, Clean Catch; Future  -     Hepatitis C Antibody; Future    2. Encounter for screening for malignant neoplasm of colon  -     Ambulatory Referral For Screening Colonoscopy  -     TSH Rfx On Abnormal To Free T4; Future  -     CBC & Differential; Future  -     Comprehensive Metabolic Panel; Future  -     Hemoglobin A1c; Future  -     Lipid Panel; Future  -     Urinalysis With Microscopic - Urine, Clean Catch; Future  -     Hepatitis C Antibody; Future    3. Need for hepatitis C screening test  -     TSH Rfx On Abnormal To Free T4; Future  -     CBC & Differential; Future  -     Comprehensive Metabolic Panel; Future  -     Hemoglobin A1c; Future  -     Lipid Panel; Future  -     Urinalysis With Microscopic - Urine, Clean Catch; Future  -     Hepatitis C Antibody; Future    4. Obesity (BMI 35.0-39.9 without  comorbidity)  -     TSH Rfx On Abnormal To Free T4; Future  -     CBC & Differential; Future  -     Comprehensive Metabolic Panel; Future  -     Hemoglobin A1c; Future  -     Lipid Panel; Future  -     Urinalysis With Microscopic - Urine, Clean Catch; Future  -     Hepatitis C Antibody; Future    5. Impaired fasting blood sugar  -     TSH Rfx On Abnormal To Free T4; Future  -     CBC & Differential; Future  -     Comprehensive Metabolic Panel; Future  -     Hemoglobin A1c; Future  -     Lipid Panel; Future  -     Urinalysis With Microscopic - Urine, Clean Catch; Future  -     Hepatitis C Antibody; Future          Tobacco Use: High Risk    Smoking Tobacco Use: Some Days    Smokeless Tobacco Use: Never    Passive Exposure: Past            Follow Up   Return in about 6 months (around 4/4/2024) for Next scheduled follow up.  Patient was given instructions and counseling regarding her condition or for health maintenance advice. Please see specific information pulled into the AVS if appropriate.

## 2023-10-19 DIAGNOSIS — K21.9 GASTROESOPHAGEAL REFLUX DISEASE WITHOUT ESOPHAGITIS: Primary | ICD-10-CM

## 2023-10-19 RX ORDER — OMEPRAZOLE 40 MG/1
40 CAPSULE, DELAYED RELEASE ORAL DAILY
Qty: 30 CAPSULE | Refills: 0 | Status: SHIPPED | OUTPATIENT
Start: 2023-10-19

## 2023-10-25 DIAGNOSIS — G43.809 OTHER MIGRAINE WITHOUT STATUS MIGRAINOSUS, NOT INTRACTABLE: ICD-10-CM

## 2023-10-25 RX ORDER — AMITRIPTYLINE HYDROCHLORIDE 10 MG/1
10 TABLET, FILM COATED ORAL EVERY EVENING
Qty: 90 TABLET | Refills: 1 | Status: SHIPPED | OUTPATIENT
Start: 2023-10-25

## 2023-11-03 ENCOUNTER — LAB (OUTPATIENT)
Dept: LAB | Facility: HOSPITAL | Age: 43
End: 2023-11-03
Payer: MEDICARE

## 2023-11-03 DIAGNOSIS — R73.01 IMPAIRED FASTING BLOOD SUGAR: ICD-10-CM

## 2023-11-03 DIAGNOSIS — F32.A ANXIETY AND DEPRESSION: ICD-10-CM

## 2023-11-03 DIAGNOSIS — Z12.11 ENCOUNTER FOR SCREENING FOR MALIGNANT NEOPLASM OF COLON: ICD-10-CM

## 2023-11-03 DIAGNOSIS — F41.9 ANXIETY AND DEPRESSION: ICD-10-CM

## 2023-11-03 DIAGNOSIS — E66.9 OBESITY (BMI 35.0-39.9 WITHOUT COMORBIDITY): ICD-10-CM

## 2023-11-03 DIAGNOSIS — Z11.59 NEED FOR HEPATITIS C SCREENING TEST: ICD-10-CM

## 2023-11-03 LAB
ALBUMIN SERPL-MCNC: 4.2 G/DL (ref 3.5–5.2)
ALBUMIN/GLOB SERPL: 1.4 G/DL
ALP SERPL-CCNC: 78 U/L (ref 39–117)
ALT SERPL W P-5'-P-CCNC: 24 U/L (ref 1–33)
ANION GAP SERPL CALCULATED.3IONS-SCNC: 9.8 MMOL/L (ref 5–15)
AST SERPL-CCNC: 22 U/L (ref 1–32)
BASOPHILS # BLD AUTO: 0.04 10*3/MM3 (ref 0–0.2)
BASOPHILS NFR BLD AUTO: 0.7 % (ref 0–1.5)
BILIRUB SERPL-MCNC: <0.2 MG/DL (ref 0–1.2)
BILIRUB UR QL STRIP: NEGATIVE
BUN SERPL-MCNC: 7 MG/DL (ref 6–20)
BUN/CREAT SERPL: 8 (ref 7–25)
CALCIUM SPEC-SCNC: 9 MG/DL (ref 8.6–10.5)
CHLORIDE SERPL-SCNC: 103 MMOL/L (ref 98–107)
CHOLEST SERPL-MCNC: 157 MG/DL (ref 0–200)
CLARITY UR: ABNORMAL
CO2 SERPL-SCNC: 24.2 MMOL/L (ref 22–29)
COLOR UR: YELLOW
CREAT SERPL-MCNC: 0.87 MG/DL (ref 0.57–1)
DEPRECATED RDW RBC AUTO: 40 FL (ref 37–54)
EGFRCR SERPLBLD CKD-EPI 2021: 83.9 ML/MIN/1.73
EOSINOPHIL # BLD AUTO: 0.06 10*3/MM3 (ref 0–0.4)
EOSINOPHIL NFR BLD AUTO: 1.1 % (ref 0.3–6.2)
ERYTHROCYTE [DISTWIDTH] IN BLOOD BY AUTOMATED COUNT: 12.2 % (ref 12.3–15.4)
GLOBULIN UR ELPH-MCNC: 3 GM/DL
GLUCOSE SERPL-MCNC: 96 MG/DL (ref 65–99)
GLUCOSE UR STRIP-MCNC: NEGATIVE MG/DL
HBA1C MFR BLD: 5.4 % (ref 4.8–5.6)
HCT VFR BLD AUTO: 45.2 % (ref 34–46.6)
HCV AB SER DONR QL: NORMAL
HDLC SERPL-MCNC: 42 MG/DL (ref 40–60)
HGB BLD-MCNC: 15.3 G/DL (ref 12–15.9)
HGB UR QL STRIP.AUTO: NEGATIVE
IMM GRANULOCYTES # BLD AUTO: 0.01 10*3/MM3 (ref 0–0.05)
IMM GRANULOCYTES NFR BLD AUTO: 0.2 % (ref 0–0.5)
KETONES UR QL STRIP: NEGATIVE
LDLC SERPL CALC-MCNC: 103 MG/DL (ref 0–100)
LDLC/HDLC SERPL: 2.45 {RATIO}
LEUKOCYTE ESTERASE UR QL STRIP.AUTO: NEGATIVE
LYMPHOCYTES # BLD AUTO: 1.72 10*3/MM3 (ref 0.7–3.1)
LYMPHOCYTES NFR BLD AUTO: 31 % (ref 19.6–45.3)
MCH RBC QN AUTO: 30.4 PG (ref 26.6–33)
MCHC RBC AUTO-ENTMCNC: 33.8 G/DL (ref 31.5–35.7)
MCV RBC AUTO: 89.9 FL (ref 79–97)
MONOCYTES # BLD AUTO: 0.43 10*3/MM3 (ref 0.1–0.9)
MONOCYTES NFR BLD AUTO: 7.7 % (ref 5–12)
NEUTROPHILS NFR BLD AUTO: 3.29 10*3/MM3 (ref 1.7–7)
NEUTROPHILS NFR BLD AUTO: 59.3 % (ref 42.7–76)
NITRITE UR QL STRIP: NEGATIVE
NRBC BLD AUTO-RTO: 0 /100 WBC (ref 0–0.2)
PH UR STRIP.AUTO: 7.5 [PH] (ref 5–8)
PLATELET # BLD AUTO: 240 10*3/MM3 (ref 140–450)
PMV BLD AUTO: 10.1 FL (ref 6–12)
POTASSIUM SERPL-SCNC: 4.3 MMOL/L (ref 3.5–5.2)
PROT SERPL-MCNC: 7.2 G/DL (ref 6–8.5)
PROT UR QL STRIP: NEGATIVE
RBC # BLD AUTO: 5.03 10*6/MM3 (ref 3.77–5.28)
SODIUM SERPL-SCNC: 137 MMOL/L (ref 136–145)
SP GR UR STRIP: 1.02 (ref 1–1.03)
TRIGL SERPL-MCNC: 61 MG/DL (ref 0–150)
TSH SERPL DL<=0.05 MIU/L-ACNC: 1.75 UIU/ML (ref 0.27–4.2)
UROBILINOGEN UR QL STRIP: ABNORMAL
VLDLC SERPL-MCNC: 12 MG/DL (ref 5–40)
WBC NRBC COR # BLD: 5.55 10*3/MM3 (ref 3.4–10.8)

## 2023-11-03 PROCEDURE — 84443 ASSAY THYROID STIM HORMONE: CPT

## 2023-11-03 PROCEDURE — 85025 COMPLETE CBC W/AUTO DIFF WBC: CPT

## 2023-11-03 PROCEDURE — 86803 HEPATITIS C AB TEST: CPT

## 2023-11-03 PROCEDURE — 80053 COMPREHEN METABOLIC PANEL: CPT

## 2023-11-03 PROCEDURE — 83036 HEMOGLOBIN GLYCOSYLATED A1C: CPT

## 2023-11-03 PROCEDURE — 80061 LIPID PANEL: CPT

## 2023-11-03 PROCEDURE — 36415 COLL VENOUS BLD VENIPUNCTURE: CPT

## 2023-11-03 PROCEDURE — 81001 URINALYSIS AUTO W/SCOPE: CPT

## 2023-11-04 LAB
BACTERIA UR QL AUTO: ABNORMAL /HPF
HYALINE CASTS UR QL AUTO: ABNORMAL /LPF
RBC # UR STRIP: ABNORMAL /HPF
REF LAB TEST METHOD: ABNORMAL
SQUAMOUS #/AREA URNS HPF: ABNORMAL /HPF
WBC # UR STRIP: ABNORMAL /HPF

## 2023-11-06 ENCOUNTER — OFFICE VISIT (OUTPATIENT)
Dept: FAMILY MEDICINE CLINIC | Facility: CLINIC | Age: 43
End: 2023-11-06
Payer: MEDICARE

## 2023-11-06 VITALS
DIASTOLIC BLOOD PRESSURE: 76 MMHG | HEIGHT: 62 IN | TEMPERATURE: 96.4 F | SYSTOLIC BLOOD PRESSURE: 98 MMHG | HEART RATE: 105 BPM | BODY MASS INDEX: 39.49 KG/M2 | OXYGEN SATURATION: 99 % | RESPIRATION RATE: 16 BRPM | WEIGHT: 214.6 LBS

## 2023-11-06 DIAGNOSIS — N90.89 PERINEAL CYST IN FEMALE: ICD-10-CM

## 2023-11-06 DIAGNOSIS — F41.9 ANXIETY AND DEPRESSION: Primary | ICD-10-CM

## 2023-11-06 DIAGNOSIS — F32.A ANXIETY AND DEPRESSION: Primary | ICD-10-CM

## 2023-11-06 PROCEDURE — 99214 OFFICE O/P EST MOD 30 MIN: CPT | Performed by: NURSE PRACTITIONER

## 2023-11-06 PROCEDURE — 1159F MED LIST DOCD IN RCRD: CPT | Performed by: NURSE PRACTITIONER

## 2023-11-06 PROCEDURE — 1160F RVW MEDS BY RX/DR IN RCRD: CPT | Performed by: NURSE PRACTITIONER

## 2023-11-06 RX ORDER — VILAZODONE HYDROCHLORIDE 10 MG/1
10 TABLET ORAL DAILY
Qty: 14 TABLET | Refills: 0 | COMMUNITY
Start: 2023-11-06 | End: 2023-11-06 | Stop reason: RX

## 2023-11-06 RX ORDER — VILAZODONE HYDROCHLORIDE 10 MG/1
10 TABLET ORAL DAILY
Qty: 30 TABLET | Refills: 1 | Status: SHIPPED | OUTPATIENT
Start: 2023-11-06 | End: 2023-11-06 | Stop reason: RX

## 2023-11-06 NOTE — PROGRESS NOTES
Answers submitted by the patient for this visit:  Other (Submitted on 2023)  Please describe your symptoms.: Anxiety. Mad one minute, crying the next.  Have you had these symptoms before?: Yes  How long have you been having these symptoms?: 1-2 weeks  Please list any medications you are currently taking for this condition.: Paxil  Please describe any probable cause for these symptoms. : Loss of father in May this year.  Primary Reason for Visit (Submitted on 2023)  What is the primary reason for your visit?: Other  Chief Complaint  Anxiety (Worse since the increased doseage)    Subjective        Janki Krueger presents to [facility]  History of Present Illness  Anxiety: has gotten worse since increasing dosage.  States only wants to eat and sleep and doesn't want to be around others.  Can't concentrate.    Currently on paxil. Effexor, zoloft, lexapro, prozac,in the past and didn't help..  also feeling depression.  Recently lost dad  and son has been acting out.    Nodules on outer labia that she gets occasionally and will finally             Anxiety  Patient reports no chest pain, dizziness, palpitations or shortness of breath.           The following portions of the patient's history were personally reviewed and updated as appropriate: allergies, current medications, past medical history, past surgical history, past family history, and past social history.     Body mass index is 39.25 kg/m².           Past History:    Medical History: has a past medical history of Anemia, Anxiety, Contact lens/glasses fitting, Depression, Heartburn, Hyperlipidemia, Nausea, Neuropathy, Obesity, PONV (postoperative nausea and vomiting), and PVD (peripheral vascular disease).     Surgical History: has a past surgical history that includes Aorta Femoral Bypass (2014);  section; Endometrial ablation (16); and Esophagogastroduodenoscopy (N/A, 4/10/2023).     Family  History: family history includes COPD in her father; Cancer in her father and paternal grandmother; Diabetes in her father; Hypertension in her father; Vision loss in her maternal grandmother.     Social History: reports that she has been smoking cigarettes. She has been exposed to tobacco smoke. She has never used smokeless tobacco. She reports that she does not drink alcohol and does not use drugs.    Allergies: Morphine          Current Outpatient Medications:     amitriptyline (ELAVIL) 10 MG tablet, TAKE ONE TABLET BY MOUTH EVERY EVENING, Disp: 90 tablet, Rfl: 1    aspirin 81 MG chewable tablet, Chew 1 tablet Daily., Disp: , Rfl:     CBD (cannabidiol) oral oil, Every 12 (Twelve) Hours., Disp: , Rfl:     clopidogrel (PLAVIX) 75 MG tablet, TAKE ONE TABLET BY MOUTH EVERY DAY, Disp: 90 tablet, Rfl: 3    HYDROcodone-acetaminophen (NORCO) 7.5-325 MG per tablet, Take 1 tablet by mouth Every 8 (Eight) Hours As Needed for Moderate Pain ., Disp: 90 tablet, Rfl: 0    Melatonin 3 MG tablet dispersible, Take 1 tablet by mouth every night at bedtime., Disp: , Rfl:     montelukast (SINGULAIR) 10 MG tablet, Take 1 tablet by mouth Every Night., Disp: 90 tablet, Rfl: 0    omeprazole (priLOSEC) 40 MG capsule, Take 1 capsule by mouth Daily., Disp: 30 capsule, Rfl: 0    ondansetron (Zofran) 4 MG tablet, Take 1 tablet by mouth Every 8 (Eight) Hours As Needed for Nausea or Vomiting., Disp: 30 tablet, Rfl: 2    pravastatin (PRAVACHOL) 80 MG tablet, Take 1 tablet by mouth Daily., Disp: 90 tablet, Rfl: 1    Vortioxetine HBr (Trintellix) 10 MG tablet tablet, Take 1 tablet by mouth Daily With Breakfast., Disp: 30 tablet, Rfl: 1    Vortioxetine HBr (Trintellix) 5 MG tablet tablet, Take 1 tablet by mouth Daily With Breakfast., Disp: 28 tablet, Rfl: 0    Medications Discontinued During This Encounter   Medication Reason    PARoxetine (Paxil) 30 MG tablet *Therapy completed    vilazodone (VIIBRYD) 10 MG tablet tablet Availability    vilazodone  "(VIIBRYD) 10 MG tablet tablet Availability         Review of Systems   Constitutional:  Negative for fever.   Respiratory:  Negative for cough and shortness of breath.    Cardiovascular:  Negative for chest pain, palpitations and leg swelling.   Neurological:  Negative for dizziness, weakness, numbness and headache.        Objective         Vitals:    11/06/23 1454   BP: 98/76   BP Location: Right arm   Patient Position: Sitting   Cuff Size: Large Adult   Pulse: 105   Resp: 16   Temp: 96.4 °F (35.8 °C)   TempSrc: Temporal   SpO2: 99%   Weight: 97.3 kg (214 lb 9.6 oz)   Height: 157.5 cm (62\")     Body mass index is 39.25 kg/m².         Physical Exam  Vitals reviewed.   Constitutional:       Appearance: Normal appearance. She is well-developed.   HENT:      Head: Normocephalic and atraumatic.      Mouth/Throat:      Pharynx: No oropharyngeal exudate.   Eyes:      Conjunctiva/sclera: Conjunctivae normal.      Pupils: Pupils are equal, round, and reactive to light.   Cardiovascular:      Rate and Rhythm: Normal rate and regular rhythm.      Heart sounds: Normal heart sounds. No murmur heard.     No friction rub. No gallop.   Pulmonary:      Effort: Pulmonary effort is normal.      Breath sounds: Normal breath sounds. No wheezing or rhonchi.   Skin:     General: Skin is warm and dry.   Neurological:      Mental Status: She is alert and oriented to person, place, and time.   Psychiatric:         Mood and Affect: Mood and affect normal.         Behavior: Behavior normal.         Thought Content: Thought content normal.         Judgment: Judgment normal.             Result Review :               Assessment and Plan     Diagnoses and all orders for this visit:    1. Anxiety and depression (Primary)  -     Discontinue: vilazodone (VIIBRYD) 10 MG tablet tablet; Take 1 tablet by mouth Daily.  Dispense: 14 tablet; Refill: 0  -     Discontinue: vilazodone (VIIBRYD) 10 MG tablet tablet; Take 1 tablet by mouth Daily.  Dispense: 30 " tablet; Refill: 1  -     Vortioxetine HBr (Trintellix) 10 MG tablet tablet; Take 1 tablet by mouth Daily With Breakfast.  Dispense: 30 tablet; Refill: 1  -     Vortioxetine HBr (Trintellix) 5 MG tablet tablet; Take 1 tablet by mouth Daily With Breakfast.  Dispense: 28 tablet; Refill: 0    2. 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      She will let us know how the medication works.        Follow Up     Return in about 1 month (around 12/6/2023).    Patient was given instructions and counseling regarding her condition or for health maintenance advice. Please see specific information pulled into the AVS if appropriate.

## 2023-11-18 ENCOUNTER — PATIENT MESSAGE (OUTPATIENT)
Dept: FAMILY MEDICINE CLINIC | Facility: CLINIC | Age: 43
End: 2023-11-18
Payer: MEDICARE

## 2023-11-20 NOTE — TELEPHONE ENCOUNTER
From: Janki Krueger  To: Len Dalene  Sent: 11/18/2023 10:33 PM EST  Subject: Trintellix    I started the 5mg trintellix and have been taking it for 5 days now. The first few days I felt ok other than having diarrhea and some dizziness. Thursday evening my heart started racing really fast while sitting down, it had also happened off and on all day Friday and Saturday but not always while sitting. I've also noticed that since I started taking the trintellix, I can't seem to think right. My mind goes blank in the middle of conversations and I often feel confused. Is this normal with taking the trintellix? The rapid heart beat is the part that is worrying me. Thanks, Nalini

## 2023-12-06 ENCOUNTER — OFFICE VISIT (OUTPATIENT)
Dept: FAMILY MEDICINE CLINIC | Facility: CLINIC | Age: 43
End: 2023-12-06
Payer: MEDICARE

## 2023-12-06 VITALS
SYSTOLIC BLOOD PRESSURE: 116 MMHG | RESPIRATION RATE: 16 BRPM | HEART RATE: 103 BPM | BODY MASS INDEX: 38.39 KG/M2 | TEMPERATURE: 97.6 F | OXYGEN SATURATION: 100 % | HEIGHT: 62 IN | WEIGHT: 208.6 LBS | DIASTOLIC BLOOD PRESSURE: 78 MMHG

## 2023-12-06 DIAGNOSIS — F41.9 ANXIETY AND DEPRESSION: Primary | ICD-10-CM

## 2023-12-06 DIAGNOSIS — F32.A ANXIETY AND DEPRESSION: Primary | ICD-10-CM

## 2023-12-06 PROCEDURE — 1159F MED LIST DOCD IN RCRD: CPT | Performed by: NURSE PRACTITIONER

## 2023-12-06 PROCEDURE — 1160F RVW MEDS BY RX/DR IN RCRD: CPT | Performed by: NURSE PRACTITIONER

## 2023-12-06 PROCEDURE — 99213 OFFICE O/P EST LOW 20 MIN: CPT | Performed by: NURSE PRACTITIONER

## 2023-12-06 NOTE — PROGRESS NOTES
Answers submitted by the patient for this visit:  Other (Submitted on 2023)  Please describe your symptoms.: Anxiety.  Have you had these symptoms before?: Yes  How long have you been having these symptoms?: Greater than 2 weeks  Primary Reason for Visit (Submitted on 2023)  What is the primary reason for your visit?: Other  Chief Complaint  Anxiety (Has some, isn't on any meds at this time but would like to be swabbed in case she decides to go on meds) and Depression    Subjective        Janki Krueger presents to [facility]  History of Present Illness  Anxiety:  would like to have genetic testing.    Depression:  doing well without medication.  States when dad  was thrown but is better.  More situational with grief.    Has lost 6 more pounds since last visit and drinking more water and decreasing caffiene.  Anxiety  Patient reports no chest pain, dizziness, palpitations or shortness of breath.     Her past medical history is significant for depression.   DepressionPatient is not experiencing: palpitations and shortness of breath.          The following portions of the patient's history were personally reviewed and updated as appropriate: allergies, current medications, past medical history, past surgical history, past family history, and past social history.     Body mass index is 38.14 kg/m².           Past History:    Medical History: has a past medical history of Anemia, Anxiety, Contact lens/glasses fitting, Depression, Heartburn, Hyperlipidemia, Nausea, Neuropathy, Obesity, PONV (postoperative nausea and vomiting), and PVD (peripheral vascular disease).     Surgical History: has a past surgical history that includes Aorta Femoral Bypass (2014);  section; Endometrial ablation (16); and Esophagogastroduodenoscopy (N/A, 4/10/2023).     Family History: family history includes COPD in her father; Cancer in her father and paternal grandmother;  Diabetes in her father; Hypertension in her father; Vision loss in her maternal grandmother.     Social History: reports that she has been smoking cigarettes. She has been exposed to tobacco smoke. She has never used smokeless tobacco. She reports that she does not drink alcohol and does not use drugs.    Allergies: Morphine          Current Outpatient Medications:     amitriptyline (ELAVIL) 10 MG tablet, TAKE ONE TABLET BY MOUTH EVERY EVENING, Disp: 90 tablet, Rfl: 1    aspirin 81 MG chewable tablet, Chew 1 tablet Daily., Disp: , Rfl:     CBD (cannabidiol) oral oil, Every 12 (Twelve) Hours., Disp: , Rfl:     clopidogrel (PLAVIX) 75 MG tablet, TAKE ONE TABLET BY MOUTH EVERY DAY, Disp: 90 tablet, Rfl: 3    HYDROcodone-acetaminophen (NORCO) 7.5-325 MG per tablet, Take 1 tablet by mouth Every 8 (Eight) Hours As Needed for Moderate Pain ., Disp: 90 tablet, Rfl: 0    Melatonin 3 MG tablet dispersible, Take 1 tablet by mouth every night at bedtime., Disp: , Rfl:     montelukast (SINGULAIR) 10 MG tablet, Take 1 tablet by mouth Every Night., Disp: 90 tablet, Rfl: 0    omeprazole (priLOSEC) 40 MG capsule, Take 1 capsule by mouth Daily., Disp: 30 capsule, Rfl: 0    ondansetron (Zofran) 4 MG tablet, Take 1 tablet by mouth Every 8 (Eight) Hours As Needed for Nausea or Vomiting., Disp: 30 tablet, Rfl: 2    pravastatin (PRAVACHOL) 80 MG tablet, Take 1 tablet by mouth Daily., Disp: 90 tablet, Rfl: 1    Medications Discontinued During This Encounter   Medication Reason    Vortioxetine HBr (Trintellix) 10 MG tablet tablet *Therapy completed    Vortioxetine HBr (Trintellix) 5 MG tablet tablet *Therapy completed         Review of Systems   Constitutional:  Negative for fever.   Respiratory:  Negative for cough and shortness of breath.    Cardiovascular:  Negative for chest pain, palpitations and leg swelling.   Neurological:  Negative for dizziness, weakness, numbness and headache.        Objective         Vitals:    12/06/23 1034  "  BP: 116/78   BP Location: Right arm   Patient Position: Sitting   Cuff Size: Large Adult   Pulse: 103   Resp: 16   Temp: 97.6 °F (36.4 °C)   TempSrc: Temporal   SpO2: 100%   Weight: 94.6 kg (208 lb 9.6 oz)   Height: 157.5 cm (62.01\")     Body mass index is 38.14 kg/m².         Physical Exam  Vitals reviewed.   Constitutional:       Appearance: Normal appearance. She is well-developed.   HENT:      Head: Normocephalic and atraumatic.      Mouth/Throat:      Pharynx: No oropharyngeal exudate.   Eyes:      Conjunctiva/sclera: Conjunctivae normal.      Pupils: Pupils are equal, round, and reactive to light.   Cardiovascular:      Rate and Rhythm: Normal rate and regular rhythm.      Heart sounds: Normal heart sounds. No murmur heard.     No friction rub. No gallop.   Pulmonary:      Effort: Pulmonary effort is normal.      Breath sounds: Normal breath sounds. No wheezing or rhonchi.   Skin:     General: Skin is warm and dry.   Neurological:      Mental Status: She is alert and oriented to person, place, and time.   Psychiatric:         Mood and Affect: Mood and affect normal.         Behavior: Behavior normal.         Thought Content: Thought content normal.         Judgment: Judgment normal.             Result Review :               Assessment and Plan     Diagnoses and all orders for this visit:    1. Anxiety and depression (Primary)  -     [facility] - Swab,; Future        Taking 1/2 pill of phentermine.      Follow Up     Return in about 6 months (around 6/6/2024).    Patient was given instructions and counseling regarding her condition or for health maintenance advice. Please see specific information pulled into the AVS if appropriate.         "

## 2023-12-11 ENCOUNTER — PATIENT MESSAGE (OUTPATIENT)
Dept: FAMILY MEDICINE CLINIC | Facility: CLINIC | Age: 43
End: 2023-12-11
Payer: MEDICARE

## 2023-12-11 DIAGNOSIS — F32.A ANXIETY AND DEPRESSION: ICD-10-CM

## 2023-12-11 DIAGNOSIS — F32.A ANXIETY AND DEPRESSION: Primary | ICD-10-CM

## 2023-12-11 DIAGNOSIS — F41.9 ANXIETY AND DEPRESSION: Primary | ICD-10-CM

## 2023-12-11 DIAGNOSIS — F41.9 ANXIETY AND DEPRESSION: ICD-10-CM

## 2023-12-11 NOTE — TELEPHONE ENCOUNTER
"Bashir Darden 12/11/2023 3:10 PM EST      ----- Message -----  From: Janki Krueger \"Nalini\"  Sent: 12/11/2023 3:05 PM EST  To: [facility]  Subject: [employer]     I'm not sure how to read this. Is Cymbalta an option for me if needed in the future? Just asking bc I've heard great things from a lot of people about that one.    "

## 2023-12-22 DIAGNOSIS — I70.213 ATHEROSCLEROSIS OF NATIVE ARTERIES OF EXTREMITIES WITH INTERMITTENT CLAUDICATION, BILATERAL LEGS: Primary | ICD-10-CM

## 2023-12-22 RX ORDER — CLOPIDOGREL BISULFATE 75 MG/1
75 TABLET ORAL DAILY
Qty: 90 TABLET | Refills: 3 | Status: SHIPPED | OUTPATIENT
Start: 2023-12-22 | End: 2024-12-16

## 2023-12-23 DIAGNOSIS — J30.2 SEASONAL ALLERGIES: ICD-10-CM

## 2023-12-23 DIAGNOSIS — R09.81 SINUS CONGESTION: ICD-10-CM

## 2023-12-26 RX ORDER — MONTELUKAST SODIUM 10 MG/1
10 TABLET ORAL NIGHTLY
Qty: 90 TABLET | Refills: 0 | Status: SHIPPED | OUTPATIENT
Start: 2023-12-26

## 2024-01-15 ENCOUNTER — TELEPHONE (OUTPATIENT)
Dept: FAMILY MEDICINE CLINIC | Facility: CLINIC | Age: 44
End: 2024-01-15
Payer: MEDICARE

## 2024-01-15 NOTE — TELEPHONE ENCOUNTER
The patient phoned stating that she is having cough, congestion, sputum production green, yellow in color. She denies fever. She does have tightness in her chest a little short of breath. She states this started yesterday AM with cough, congestion. [phone]

## 2024-01-15 NOTE — TELEPHONE ENCOUNTER
We can either get her in this week to get an evaluation.  To determine what she is going on.  Is not ideal to send an antibiotic without evaluating her first but we can.

## 2024-01-16 ENCOUNTER — OFFICE VISIT (OUTPATIENT)
Dept: FAMILY MEDICINE CLINIC | Facility: CLINIC | Age: 44
End: 2024-01-16
Payer: MEDICARE

## 2024-01-16 VITALS
OXYGEN SATURATION: 99 % | SYSTOLIC BLOOD PRESSURE: 134 MMHG | TEMPERATURE: 99.3 F | BODY MASS INDEX: 38.16 KG/M2 | HEART RATE: 112 BPM | WEIGHT: 207.4 LBS | DIASTOLIC BLOOD PRESSURE: 82 MMHG | HEIGHT: 62 IN

## 2024-01-16 DIAGNOSIS — J06.9 UPPER RESPIRATORY TRACT INFECTION, UNSPECIFIED TYPE: ICD-10-CM

## 2024-01-16 DIAGNOSIS — E78.00 HIGH CHOLESTEROL: ICD-10-CM

## 2024-01-16 DIAGNOSIS — R05.1 ACUTE COUGH: Primary | ICD-10-CM

## 2024-01-16 DIAGNOSIS — J40 BRONCHITIS: ICD-10-CM

## 2024-01-16 LAB
EXPIRATION DATE: NORMAL
FLUAV AG UPPER RESP QL IA.RAPID: NOT DETECTED
FLUBV AG UPPER RESP QL IA.RAPID: NOT DETECTED
INTERNAL CONTROL: NORMAL
Lab: NORMAL
SARS-COV-2 AG UPPER RESP QL IA.RAPID: NOT DETECTED

## 2024-01-16 PROCEDURE — 87428 SARSCOV & INF VIR A&B AG IA: CPT | Performed by: NURSE PRACTITIONER

## 2024-01-16 PROCEDURE — 99214 OFFICE O/P EST MOD 30 MIN: CPT | Performed by: NURSE PRACTITIONER

## 2024-01-16 PROCEDURE — 1159F MED LIST DOCD IN RCRD: CPT | Performed by: NURSE PRACTITIONER

## 2024-01-16 PROCEDURE — 1160F RVW MEDS BY RX/DR IN RCRD: CPT | Performed by: NURSE PRACTITIONER

## 2024-01-16 RX ORDER — AZITHROMYCIN 250 MG/1
TABLET, FILM COATED ORAL
Qty: 6 TABLET | Refills: 0 | Status: SHIPPED | OUTPATIENT
Start: 2024-01-16

## 2024-01-16 RX ORDER — PRAVASTATIN SODIUM 80 MG/1
80 TABLET ORAL DAILY
Qty: 90 TABLET | Refills: 1 | Status: SHIPPED | OUTPATIENT
Start: 2024-01-16

## 2024-01-16 RX ORDER — FLUCONAZOLE 150 MG/1
150 TABLET ORAL ONCE
Qty: 2 TABLET | Refills: 0 | Status: SHIPPED | OUTPATIENT
Start: 2024-01-16 | End: 2024-01-16

## 2024-01-16 RX ORDER — METHYLPREDNISOLONE 4 MG/1
TABLET ORAL
Qty: 21 TABLET | Refills: 0 | Status: SHIPPED | OUTPATIENT
Start: 2024-01-16

## 2024-01-16 RX ORDER — DEXTROMETHORPHAN HYDROBROMIDE AND PROMETHAZINE HYDROCHLORIDE 15; 6.25 MG/5ML; MG/5ML
5 SYRUP ORAL 4 TIMES DAILY PRN
Qty: 118 ML | Refills: 0 | Status: SHIPPED | OUTPATIENT
Start: 2024-01-16

## 2024-01-16 NOTE — PROGRESS NOTES
"Chief Complaint  Cough    Subjective         Janki Krueger presents to [facility]  Presents to the office today with concerns over cough congestion general body aches shortness of breath chest congestion sneezing.  She also has complained of low-grade fevers and general weakness.  Patient states that the symptoms started approximately 3 days ago.  She states they have progressively become worse.  She is running a low-grade temperature at night 99.3.  Blood pressure is 134/82.   COVID and flu testing was negative.  She does state that her smoking has decreased significantly.    Cough  This is a new problem. The current episode started yesterday. The problem has been worsening. The problem occurs every few minutes. The cough is Productive of yellow sputum. Associated symptoms include chest pain, ear congestion, headaches, nasal congestion, shortness of breath and wheezing. Pertinent negatives include no chills, ear pain, fever, heartburn, hemoptysis, myalgias, postnasal drip, rash, rhinorrhea, sore throat, sweats or weight loss. The symptoms are aggravated by cold air.        Objective     Vitals:    01/16/24 1025   BP: 134/82   BP Location: Right arm   Patient Position: Sitting   Cuff Size: Adult   Pulse: 112   Temp: 99.3 °F (37.4 °C)   TempSrc: Temporal   SpO2: 99%   Weight: 94.1 kg (207 lb 6.4 oz)   Height: 157.5 cm (62\")      Body mass index is 37.93 kg/m².             Physical Exam  Vitals reviewed.   Constitutional:       Appearance: Normal appearance.   HENT:      Right Ear: Hearing, tympanic membrane, ear canal and external ear normal.      Left Ear: Hearing, tympanic membrane, ear canal and external ear normal.      Nose: Congestion and rhinorrhea present.      Right Sinus: Maxillary sinus tenderness present.      Left Sinus: Maxillary sinus tenderness present.   Cardiovascular:      Rate and Rhythm: Normal rate and regular rhythm.      Pulses: Normal pulses.      Heart " sounds: Normal heart sounds, S1 normal and S2 normal. No murmur heard.  Pulmonary:      Effort: Pulmonary effort is normal. No respiratory distress.      Breath sounds: Normal breath sounds. Examination of the right-middle field reveals rhonchi. Examination of the left-middle field reveals rhonchi. Examination of the right-lower field reveals wheezing. Examination of the left-lower field reveals wheezing.   Skin:     General: Skin is warm and dry.   Neurological:      Mental Status: She is alert and oriented to person, place, and time.   Psychiatric:         Attention and Perception: Attention normal.         Mood and Affect: Mood normal.         Behavior: Behavior normal.          Result Review :   The following data was reviewed by: REG Morales on 01/16/2024:      Procedures    Assessment and Plan   Diagnoses and all orders for this visit:    1. Acute cough (Primary)  -     POCT SARS-CoV-2 Antigen LILIAN + Flu  -     promethazine-dextromethorphan (PROMETHAZINE-DM) 6.25-15 MG/5ML syrup; Take 5 mL by mouth 4 (Four) Times a Day As Needed for Cough.  Dispense: 118 mL; Refill: 0    2. Upper respiratory tract infection, unspecified type  -     methylPREDNISolone (MEDROL) 4 MG dose pack; Take as directed on package instructions.  Dispense: 21 tablet; Refill: 0  -     azithromycin (Zithromax Z-Christophe) 250 MG tablet; Take 2 tablets by mouth on day 1, then 1 tablet daily on days 2-5  Dispense: 6 tablet; Refill: 0    3. Bronchitis  -     methylPREDNISolone (MEDROL) 4 MG dose pack; Take as directed on package instructions.  Dispense: 21 tablet; Refill: 0  -     azithromycin (Zithromax Z-Christophe) 250 MG tablet; Take 2 tablets by mouth on day 1, then 1 tablet daily on days 2-5  Dispense: 6 tablet; Refill: 0    Other orders  -     fluconazole (Diflucan) 150 MG tablet; Take 1 tablet by mouth 1 (One) Time for 1 dose. May repeat in 4 days if needed  Dispense: 2 tablet; Refill: 0    Patient does state that she gets frequent yeast  infections with antibiotic use.  I explained that I would send Diflucan to the pharmacy for her.      Follow Up   Return if symptoms worsen or fail to improve, for Next scheduled follow up.  Patient was given instructions and counseling regarding her condition or for health maintenance advice. Please see specific information pulled into the AVS if appropriate.       Answers submitted by the patient for this visit:  Primary Reason for Visit (Submitted on 1/15/2024)  What is the primary reason for your visit?: Cough

## 2024-01-31 ENCOUNTER — PATIENT MESSAGE (OUTPATIENT)
Dept: FAMILY MEDICINE CLINIC | Facility: CLINIC | Age: 44
End: 2024-01-31

## 2024-01-31 ENCOUNTER — HOSPITAL ENCOUNTER (OUTPATIENT)
Dept: GENERAL RADIOLOGY | Facility: HOSPITAL | Age: 44
Discharge: HOME | End: 2024-01-31
Admitting: NURSE PRACTITIONER
Payer: MEDICARE

## 2024-01-31 ENCOUNTER — OFFICE VISIT (OUTPATIENT)
Dept: FAMILY MEDICINE CLINIC | Facility: CLINIC | Age: 44
End: 2024-01-31
Payer: MEDICARE

## 2024-01-31 VITALS
DIASTOLIC BLOOD PRESSURE: 82 MMHG | WEIGHT: 208.2 LBS | TEMPERATURE: 97.1 F | OXYGEN SATURATION: 100 % | BODY MASS INDEX: 38.31 KG/M2 | HEART RATE: 99 BPM | SYSTOLIC BLOOD PRESSURE: 130 MMHG | HEIGHT: 62 IN | RESPIRATION RATE: 16 BRPM

## 2024-01-31 DIAGNOSIS — M25.512 ACUTE PAIN OF LEFT SHOULDER: ICD-10-CM

## 2024-01-31 DIAGNOSIS — Z12.11 SCREEN FOR COLON CANCER: Primary | ICD-10-CM

## 2024-01-31 DIAGNOSIS — M25.512 ACUTE PAIN OF LEFT SHOULDER: Primary | ICD-10-CM

## 2024-01-31 PROCEDURE — 73030 X-RAY EXAM OF SHOULDER: CPT

## 2024-01-31 NOTE — PROGRESS NOTES
Answers submitted by the patient for this visit:  Other (Submitted on 2024)  Please describe your symptoms.: Miserable  Have you had these symptoms before?: No  How long have you been having these symptoms?: Greater than 2 weeks  Primary Reason for Visit (Submitted on 2024)  What is the primary reason for your visit?: Other  Chief Complaint  Obesity (She is hungry all the time.  Went to weight loss clinic was put on phentermine worked for a month and then stopped working and it made her pick.)    Subjective        Janki Krueger presents to [facility]  History of Present Illness  Weight loss:  phentermine didn't help.  Would like to go to a different weight loss clinic.    Obesity  Pertinent negatives include no chest pain, coughing, fever, numbness or weakness.       The following portions of the patient's history were personally reviewed and updated as appropriate: allergies, current medications, past medical history, past surgical history, past family history, and past social history.     Body mass index is 38.07 kg/m².           Past History:    Medical History: has a past medical history of Anemia, Anxiety, Contact lens/glasses fitting, Depression, GERD (gastroesophageal reflux disease), Heartburn, Hyperlipidemia, Nausea, Neuropathy, Obesity, PONV (postoperative nausea and vomiting), and PVD (peripheral vascular disease).     Surgical History: has a past surgical history that includes Aorta Femoral Bypass (2014);  section; Endometrial ablation (16); and Esophagogastroduodenoscopy (N/A, 4/10/2023).     Family History: family history includes COPD in her father; Cancer in her father and paternal grandmother; Diabetes in her father; Hypertension in her father; Vision loss in her maternal grandmother.     Social History: reports that she has been smoking cigarettes. She has been smoking an average of .25 packs per day. She has been exposed to tobacco  smoke. She has never used smokeless tobacco. She reports that she does not drink alcohol and does not use drugs.    Allergies: Morphine          Current Outpatient Medications:     amitriptyline (ELAVIL) 10 MG tablet, TAKE ONE TABLET BY MOUTH EVERY EVENING, Disp: 90 tablet, Rfl: 1    aspirin 81 MG chewable tablet, Chew 1 tablet Daily., Disp: , Rfl:     CBD (cannabidiol) oral oil, Every 12 (Twelve) Hours., Disp: , Rfl:     clopidogrel (PLAVIX) 75 MG tablet, Take 1 tablet by mouth Daily for 360 days., Disp: 90 tablet, Rfl: 3    HYDROcodone-acetaminophen (NORCO) 7.5-325 MG per tablet, Take 1 tablet by mouth Every 8 (Eight) Hours As Needed for Moderate Pain ., Disp: 90 tablet, Rfl: 0    Melatonin 3 MG tablet dispersible, Take 1 tablet by mouth every night at bedtime., Disp: , Rfl:     montelukast (SINGULAIR) 10 MG tablet, Take 1 tablet by mouth Every Night., Disp: 90 tablet, Rfl: 0    omeprazole (priLOSEC) 40 MG capsule, Take 1 capsule by mouth Daily. (Patient taking differently: Take 1 capsule by mouth Daily As Needed.), Disp: 30 capsule, Rfl: 0    ondansetron (Zofran) 4 MG tablet, Take 1 tablet by mouth Every 8 (Eight) Hours As Needed for Nausea or Vomiting., Disp: 30 tablet, Rfl: 2    pravastatin (PRAVACHOL) 80 MG tablet, TAKE ONE TABLET BY MOUTH EVERY DAY, Disp: 90 tablet, Rfl: 1    azithromycin (Zithromax Z-Christophe) 250 MG tablet, Take 2 tablets by mouth on day 1, then 1 tablet daily on days 2-5 (Patient not taking: Reported on 1/31/2024), Disp: 6 tablet, Rfl: 0    methylPREDNISolone (MEDROL) 4 MG dose pack, Take as directed on package instructions. (Patient not taking: Reported on 1/31/2024), Disp: 21 tablet, Rfl: 0    promethazine-dextromethorphan (PROMETHAZINE-DM) 6.25-15 MG/5ML syrup, Take 5 mL by mouth 4 (Four) Times a Day As Needed for Cough. (Patient not taking: Reported on 1/31/2024), Disp: 118 mL, Rfl: 0    There are no discontinued medications.      Review of Systems   Constitutional:  Negative for fever.  "  Respiratory:  Negative for cough and shortness of breath.    Cardiovascular:  Negative for chest pain, palpitations and leg swelling.   Neurological:  Negative for dizziness, weakness, numbness and headache.        Objective         Vitals:    01/31/24 1033   BP: 130/82   BP Location: Right arm   Patient Position: Sitting   Cuff Size: Large Adult   Pulse: 99   Resp: 16   Temp: 97.1 °F (36.2 °C)   TempSrc: Temporal   SpO2: 100%   Weight: 94.4 kg (208 lb 3.2 oz)   Height: 157.5 cm (62.01\")     Body mass index is 38.07 kg/m².         Physical Exam  Vitals reviewed.   Constitutional:       Appearance: Normal appearance. She is well-developed.   HENT:      Head: Normocephalic and atraumatic.      Mouth/Throat:      Pharynx: No oropharyngeal exudate.   Eyes:      Conjunctiva/sclera: Conjunctivae normal.      Pupils: Pupils are equal, round, and reactive to light.   Cardiovascular:      Rate and Rhythm: Normal rate and regular rhythm.      Heart sounds: Normal heart sounds. No murmur heard.     No friction rub. No gallop.   Pulmonary:      Effort: Pulmonary effort is normal.      Breath sounds: Normal breath sounds. No wheezing or rhonchi.   Skin:     General: Skin is warm and dry.   Neurological:      Mental Status: She is alert and oriented to person, place, and time.   Psychiatric:         Mood and Affect: Mood and affect normal.         Behavior: Behavior normal.         Thought Content: Thought content normal.         Judgment: Judgment normal.             Result Review :               Assessment and Plan     Diagnoses and all orders for this visit:    1. Screen for colon cancer (Primary)  -     Ambulatory Referral For Screening Colonoscopy    2. Acute pain of left shoulder  -     XR Shoulder 2+ View Left; Future              Follow Up     Return for Next scheduled follow up.    Patient was given instructions and counseling regarding her condition or for health maintenance advice. Please see specific information " pulled into the AVS if appropriate.

## 2024-01-31 NOTE — PROGRESS NOTES
Answers submitted by the patient for this visit:  Other (Submitted on 2024)  Please describe your symptoms.: Miserable  Have you had these symptoms before?: No  How long have you been having these symptoms?: Greater than 2 weeks  Primary Reason for Visit (Submitted on 2024)  What is the primary reason for your visit?: Other  Chief Complaint  Obesity (She is hungry all the time.  Went to weight loss clinic was put on phentermine worked for a month and then stopped working and it made her pick.)    Subjective        Janki Krueger presents to [facility]  History of Present Illness    The following portions of the patient's history were personally reviewed and updated as appropriate: allergies, current medications, past medical history, past surgical history, past family history, and past social history.     Body mass index is 38.07 kg/m².           Past History:    Medical History: has a past medical history of Anemia, Anxiety, Contact lens/glasses fitting, Depression, GERD (gastroesophageal reflux disease), Heartburn, Hyperlipidemia, Nausea, Neuropathy, Obesity, PONV (postoperative nausea and vomiting), and PVD (peripheral vascular disease).     Surgical History: has a past surgical history that includes Aorta Femoral Bypass (2014);  section; Endometrial ablation (16); and Esophagogastroduodenoscopy (N/A, 4/10/2023).     Family History: family history includes COPD in her father; Cancer in her father and paternal grandmother; Diabetes in her father; Hypertension in her father; Vision loss in her maternal grandmother.     Social History: reports that she has been smoking cigarettes. She has been smoking an average of .25 packs per day. She has been exposed to tobacco smoke. She has never used smokeless tobacco. She reports that she does not drink alcohol and does not use drugs.    Allergies: Morphine          Current Outpatient Medications:      amitriptyline (ELAVIL) 10 MG tablet, TAKE ONE TABLET BY MOUTH EVERY EVENING, Disp: 90 tablet, Rfl: 1    aspirin 81 MG chewable tablet, Chew 1 tablet Daily., Disp: , Rfl:     CBD (cannabidiol) oral oil, Every 12 (Twelve) Hours., Disp: , Rfl:     clopidogrel (PLAVIX) 75 MG tablet, Take 1 tablet by mouth Daily for 360 days., Disp: 90 tablet, Rfl: 3    HYDROcodone-acetaminophen (NORCO) 7.5-325 MG per tablet, Take 1 tablet by mouth Every 8 (Eight) Hours As Needed for Moderate Pain ., Disp: 90 tablet, Rfl: 0    Melatonin 3 MG tablet dispersible, Take 1 tablet by mouth every night at bedtime., Disp: , Rfl:     montelukast (SINGULAIR) 10 MG tablet, Take 1 tablet by mouth Every Night., Disp: 90 tablet, Rfl: 0    omeprazole (priLOSEC) 40 MG capsule, Take 1 capsule by mouth Daily. (Patient taking differently: Take 1 capsule by mouth Daily As Needed.), Disp: 30 capsule, Rfl: 0    ondansetron (Zofran) 4 MG tablet, Take 1 tablet by mouth Every 8 (Eight) Hours As Needed for Nausea or Vomiting., Disp: 30 tablet, Rfl: 2    pravastatin (PRAVACHOL) 80 MG tablet, TAKE ONE TABLET BY MOUTH EVERY DAY, Disp: 90 tablet, Rfl: 1    azithromycin (Zithromax Z-Christophe) 250 MG tablet, Take 2 tablets by mouth on day 1, then 1 tablet daily on days 2-5 (Patient not taking: Reported on 1/31/2024), Disp: 6 tablet, Rfl: 0    methylPREDNISolone (MEDROL) 4 MG dose pack, Take as directed on package instructions. (Patient not taking: Reported on 1/31/2024), Disp: 21 tablet, Rfl: 0    promethazine-dextromethorphan (PROMETHAZINE-DM) 6.25-15 MG/5ML syrup, Take 5 mL by mouth 4 (Four) Times a Day As Needed for Cough. (Patient not taking: Reported on 1/31/2024), Disp: 118 mL, Rfl: 0    There are no discontinued medications.      Review of Systems     Objective         Vitals:    01/31/24 1033   BP: 130/82   BP Location: Right arm   Patient Position: Sitting   Cuff Size: Large Adult   Pulse: 99   Resp: 16   Temp: 97.1 °F (36.2 °C)   TempSrc: Temporal   SpO2: 100%  "  Weight: 94.4 kg (208 lb 3.2 oz)   Height: 157.5 cm (62.01\")     Body mass index is 38.07 kg/m².         Physical Exam        Result Review :               Assessment and Plan     Diagnoses and all orders for this visit:    1. Screen for colon cancer (Primary)              Follow Up     No follow-ups on file.    Patient was given instructions and counseling regarding her condition or for health maintenance advice. Please see specific information pulled into the AVS if appropriate.         "

## 2024-02-01 NOTE — TELEPHONE ENCOUNTER
From: Janki Krueger  To: Len Hammond  Sent: 1/31/2024 5:21 PM EST  Subject: Shoulder pain    Is there something I can take to maybe ease it some? I take pain meds when needed but it doesn't help the shoulder pain at all. Would a muscle relaxer help or is there something else that would help?

## 2024-02-12 DIAGNOSIS — K21.9 GASTROESOPHAGEAL REFLUX DISEASE WITHOUT ESOPHAGITIS: ICD-10-CM

## 2024-02-12 RX ORDER — OMEPRAZOLE 40 MG/1
40 CAPSULE, DELAYED RELEASE ORAL DAILY
Qty: 90 CAPSULE | Refills: 1 | Status: SHIPPED | OUTPATIENT
Start: 2024-02-12

## 2024-02-19 ENCOUNTER — PATIENT MESSAGE (OUTPATIENT)
Dept: FAMILY MEDICINE CLINIC | Facility: CLINIC | Age: 44
End: 2024-02-19
Payer: MEDICARE

## 2024-02-19 DIAGNOSIS — K59.00 CONSTIPATION, UNSPECIFIED CONSTIPATION TYPE: Primary | ICD-10-CM

## 2024-02-19 NOTE — TELEPHONE ENCOUNTER
From: Janki Krueger  To: Len Hammond  Sent: 2/19/2024 1:43 PM EST  Subject: Constipation    I'm having really bad issues with constipation. What can I do? I have not had a bowel movement in 2 weeks. I drink miralax every morning in my coffee. Friday I took 2 dulcolax, 1 movantik, and a full dose of ojeda milk of magnesia. Saturday morning I had my coffee with miralax and 1 dose of milk of magnesia. I did go twice on Saturday but not enough to amount to anything. Sunday I did the same.

## 2024-02-20 ENCOUNTER — HOSPITAL ENCOUNTER (OUTPATIENT)
Dept: GENERAL RADIOLOGY | Facility: HOSPITAL | Age: 44
Discharge: HOME | End: 2024-02-20
Admitting: NURSE PRACTITIONER
Payer: MEDICARE

## 2024-02-20 DIAGNOSIS — K59.00 CONSTIPATION, UNSPECIFIED CONSTIPATION TYPE: ICD-10-CM

## 2024-02-20 PROCEDURE — 74019 RADEX ABDOMEN 2 VIEWS: CPT

## 2024-02-23 ENCOUNTER — LAB (OUTPATIENT)
Dept: LAB | Facility: HOSPITAL | Age: 44
End: 2024-02-23
Payer: MEDICARE

## 2024-02-23 ENCOUNTER — OFFICE VISIT (OUTPATIENT)
Dept: FAMILY MEDICINE CLINIC | Facility: CLINIC | Age: 44
End: 2024-02-23
Payer: MEDICARE

## 2024-02-23 VITALS
HEART RATE: 88 BPM | OXYGEN SATURATION: 100 % | SYSTOLIC BLOOD PRESSURE: 122 MMHG | BODY MASS INDEX: 38.28 KG/M2 | RESPIRATION RATE: 16 BRPM | HEIGHT: 62 IN | WEIGHT: 208 LBS | DIASTOLIC BLOOD PRESSURE: 64 MMHG | TEMPERATURE: 98.2 F

## 2024-02-23 DIAGNOSIS — R42 DIZZINESS: ICD-10-CM

## 2024-02-23 DIAGNOSIS — K59.00 CONSTIPATION, UNSPECIFIED CONSTIPATION TYPE: ICD-10-CM

## 2024-02-23 DIAGNOSIS — R53.83 FATIGUE, UNSPECIFIED TYPE: ICD-10-CM

## 2024-02-23 DIAGNOSIS — R42 DIZZINESS: Primary | ICD-10-CM

## 2024-02-23 LAB
ALBUMIN SERPL-MCNC: 4 G/DL (ref 3.5–5.2)
ALBUMIN/GLOB SERPL: 1.5 G/DL
ALP SERPL-CCNC: 74 U/L (ref 39–117)
ALT SERPL W P-5'-P-CCNC: 16 U/L (ref 1–33)
ANION GAP SERPL CALCULATED.3IONS-SCNC: 7 MMOL/L (ref 5–15)
AST SERPL-CCNC: 16 U/L (ref 1–32)
BILIRUB SERPL-MCNC: <0.2 MG/DL (ref 0–1.2)
BUN SERPL-MCNC: 8 MG/DL (ref 6–20)
BUN/CREAT SERPL: 9.2 (ref 7–25)
CALCIUM SPEC-SCNC: 8.9 MG/DL (ref 8.6–10.5)
CHLORIDE SERPL-SCNC: 107 MMOL/L (ref 98–107)
CO2 SERPL-SCNC: 28 MMOL/L (ref 22–29)
CREAT SERPL-MCNC: 0.87 MG/DL (ref 0.57–1)
DEPRECATED RDW RBC AUTO: 40.5 FL (ref 37–54)
EGFRCR SERPLBLD CKD-EPI 2021: 83.9 ML/MIN/1.73
ERYTHROCYTE [DISTWIDTH] IN BLOOD BY AUTOMATED COUNT: 12.3 % (ref 12.3–15.4)
GLOBULIN UR ELPH-MCNC: 2.7 GM/DL
GLUCOSE SERPL-MCNC: 73 MG/DL (ref 65–99)
HCT VFR BLD AUTO: 42.2 % (ref 34–46.6)
HGB BLD-MCNC: 13.6 G/DL (ref 12–15.9)
MCH RBC QN AUTO: 29 PG (ref 26.6–33)
MCHC RBC AUTO-ENTMCNC: 32.2 G/DL (ref 31.5–35.7)
MCV RBC AUTO: 90 FL (ref 79–97)
PLATELET # BLD AUTO: 232 10*3/MM3 (ref 140–450)
PMV BLD AUTO: 10.9 FL (ref 6–12)
POTASSIUM SERPL-SCNC: 4.4 MMOL/L (ref 3.5–5.2)
PROT SERPL-MCNC: 6.7 G/DL (ref 6–8.5)
RBC # BLD AUTO: 4.69 10*6/MM3 (ref 3.77–5.28)
SODIUM SERPL-SCNC: 142 MMOL/L (ref 136–145)
TSH SERPL DL<=0.05 MIU/L-ACNC: 1.47 UIU/ML (ref 0.27–4.2)
WBC NRBC COR # BLD AUTO: 7.26 10*3/MM3 (ref 3.4–10.8)

## 2024-02-23 PROCEDURE — 99214 OFFICE O/P EST MOD 30 MIN: CPT | Performed by: NURSE PRACTITIONER

## 2024-02-23 PROCEDURE — 36415 COLL VENOUS BLD VENIPUNCTURE: CPT

## 2024-02-23 PROCEDURE — 84443 ASSAY THYROID STIM HORMONE: CPT

## 2024-02-23 PROCEDURE — 1159F MED LIST DOCD IN RCRD: CPT | Performed by: NURSE PRACTITIONER

## 2024-02-23 PROCEDURE — 80053 COMPREHEN METABOLIC PANEL: CPT

## 2024-02-23 PROCEDURE — 1170F FXNL STATUS ASSESSED: CPT | Performed by: NURSE PRACTITIONER

## 2024-02-23 PROCEDURE — 1160F RVW MEDS BY RX/DR IN RCRD: CPT | Performed by: NURSE PRACTITIONER

## 2024-02-23 PROCEDURE — 85027 COMPLETE CBC AUTOMATED: CPT

## 2024-02-23 NOTE — PROGRESS NOTES
Answers submitted by the patient for this visit:  Other (Submitted on 2024)  Please describe your symptoms.: Severe constipation  Have you had these symptoms before?: No  How long have you been having these symptoms?: 1-2 weeks  Primary Reason for Visit (Submitted on 2024)  What is the primary reason for your visit?: Other  Chief Complaint  Constipation (Swelling in her stomach, hadn't been able to pass gas, broke out in a cold sweat when she got up yesterday and then had diarrhea) and Medicare Wellness-subsequent    Subjective        Janki Krueger presents to [facility]  Constipation        The following portions of the patient's history were personally reviewed and updated as appropriate: allergies, current medications, past medical history, past surgical history, past family history, and past social history.     Body mass index is 38.03 kg/m².    Class 2 Severe Obesity (BMI >=35 and <=39.9). Obesity-related health conditions include the following: dyslipidemias. Obesity is unchanged. BMI is is above average; BMI management plan is completed. We discussed portion control and increasing exercise.      Past History:    Medical History: has a past medical history of Anemia, Anxiety, Contact lens/glasses fitting, Depression, GERD (gastroesophageal reflux disease), Heartburn, Hyperlipidemia, Nausea, Neuropathy, Obesity, PONV (postoperative nausea and vomiting), and PVD (peripheral vascular disease).     Surgical History: has a past surgical history that includes Aorta Femoral Bypass (2014);  section; Endometrial ablation (16); and Esophagogastroduodenoscopy (N/A, 4/10/2023).     Family History: family history includes COPD in her father; Cancer in her father and paternal grandmother; Diabetes in her father; Hypertension in her father; Vision loss in her maternal grandmother.     Social History: reports that she has been smoking cigarettes. She has been  smoking an average of .25 packs per day. She has been exposed to tobacco smoke. She has never used smokeless tobacco. She reports that she does not drink alcohol and does not use drugs.    Allergies: Morphine          Current Outpatient Medications:     amitriptyline (ELAVIL) 10 MG tablet, TAKE ONE TABLET BY MOUTH EVERY EVENING, Disp: 90 tablet, Rfl: 1    aspirin 81 MG chewable tablet, Chew 1 tablet Daily., Disp: , Rfl:     CBD (cannabidiol) oral oil, Every 12 (Twelve) Hours., Disp: , Rfl:     clopidogrel (PLAVIX) 75 MG tablet, Take 1 tablet by mouth Daily for 360 days., Disp: 90 tablet, Rfl: 3    diclofenac (VOLTAREN) 50 MG EC tablet, Take 1 tablet by mouth 2 (Two) Times a Day As Needed (pain)., Disp: 60 tablet, Rfl: 1    HYDROcodone-acetaminophen (NORCO) 7.5-325 MG per tablet, Take 1 tablet by mouth Every 8 (Eight) Hours As Needed for Moderate Pain ., Disp: 90 tablet, Rfl: 0    Melatonin 3 MG tablet dispersible, Take 1 tablet by mouth every night at bedtime., Disp: , Rfl:     montelukast (SINGULAIR) 10 MG tablet, Take 1 tablet by mouth Every Night., Disp: 90 tablet, Rfl: 0    omeprazole (priLOSEC) 40 MG capsule, Take 1 capsule by mouth Daily., Disp: 90 capsule, Rfl: 1    ondansetron (Zofran) 4 MG tablet, Take 1 tablet by mouth Every 8 (Eight) Hours As Needed for Nausea or Vomiting., Disp: 30 tablet, Rfl: 2    pravastatin (PRAVACHOL) 80 MG tablet, TAKE ONE TABLET BY MOUTH EVERY DAY, Disp: 90 tablet, Rfl: 1    NON FORMULARY, Inject 0.25 mg under the skin into the appropriate area as directed 1 (One) Time Per Week. Weekly - gets from weight clinic - Semiglutide., Disp: , Rfl:     Semaglutide,0.25 or 0.5MG/DOS, (OZEMPIC) 2 MG/1.5ML solution pen-injector, Inject 0.25 mg under the skin into the appropriate area as directed 1 (One) Time Per Week., Disp: , Rfl:     Medications Discontinued During This Encounter   Medication Reason    methylPREDNISolone (MEDROL) 4 MG dose pack *Therapy completed    azithromycin (Zithromax  "Z-Christophe) 250 MG tablet *Therapy completed    promethazine-dextromethorphan (PROMETHAZINE-DM) 6.25-15 MG/5ML syrup *Therapy completed         Review of Systems   Gastrointestinal:  Positive for constipation.        Objective         Vitals:    02/23/24 1048   BP: 122/64   BP Location: Right arm   Patient Position: Sitting   Cuff Size: Large Adult   Pulse: 88   Resp: 16   Temp: 98.2 °F (36.8 °C)   TempSrc: Temporal   SpO2: 100%   Weight: 94.3 kg (208 lb)   Height: 157.5 cm (62.01\")     Body mass index is 38.03 kg/m².         Physical Exam  Vitals reviewed.   Constitutional:       Appearance: Normal appearance. She is well-developed.   HENT:      Head: Normocephalic and atraumatic.      Mouth/Throat:      Pharynx: No oropharyngeal exudate.   Eyes:      Conjunctiva/sclera: Conjunctivae normal.      Pupils: Pupils are equal, round, and reactive to light.   Cardiovascular:      Rate and Rhythm: Normal rate and regular rhythm.      Heart sounds: Normal heart sounds. No murmur heard.     No friction rub. No gallop.   Pulmonary:      Effort: Pulmonary effort is normal.      Breath sounds: Normal breath sounds. No wheezing or rhonchi.   Abdominal:      General: Bowel sounds are normal.      Palpations: Abdomen is soft.      Tenderness: There is abdominal tenderness.   Musculoskeletal:         General: Normal range of motion.      Cervical back: Normal range of motion.   Skin:     General: Skin is warm and dry.   Neurological:      Mental Status: She is alert and oriented to person, place, and time.   Psychiatric:         Mood and Affect: Mood and affect normal.         Behavior: Behavior normal.         Thought Content: Thought content normal.         Judgment: Judgment normal.             Result Review :               Assessment and Plan     Diagnoses and all orders for this visit:    1. Dizziness (Primary)  -     CBC (No Diff); Future  -     Comprehensive Metabolic Panel; Future    2. Constipation, unspecified constipation " type    3. Fatigue, unspecified type  -     TSH Rfx On Abnormal To Free T4; Future              Follow Up     Return for Next scheduled follow up.    Patient was given instructions and counseling regarding her condition or for health maintenance advice. Please see specific information pulled into the AVS if appropriate.

## 2024-03-25 DIAGNOSIS — J30.2 SEASONAL ALLERGIES: ICD-10-CM

## 2024-03-25 DIAGNOSIS — R09.81 SINUS CONGESTION: ICD-10-CM

## 2024-03-25 RX ORDER — MONTELUKAST SODIUM 10 MG/1
10 TABLET ORAL NIGHTLY
Qty: 90 TABLET | Refills: 0 | Status: SHIPPED | OUTPATIENT
Start: 2024-03-25

## 2024-04-16 DIAGNOSIS — G43.809 OTHER MIGRAINE WITHOUT STATUS MIGRAINOSUS, NOT INTRACTABLE: ICD-10-CM

## 2024-04-16 RX ORDER — AMITRIPTYLINE HYDROCHLORIDE 10 MG/1
10 TABLET, FILM COATED ORAL EVERY EVENING
Qty: 90 TABLET | Refills: 1 | Status: SHIPPED | OUTPATIENT
Start: 2024-04-16

## 2024-04-19 ENCOUNTER — TRANSCRIBE ORDERS (OUTPATIENT)
Dept: ADMINISTRATIVE | Facility: HOSPITAL | Age: 44
End: 2024-04-19
Payer: MEDICARE

## 2024-04-19 DIAGNOSIS — Z12.31 VISIT FOR SCREENING MAMMOGRAM: Primary | ICD-10-CM

## 2024-05-06 ENCOUNTER — TELEPHONE (OUTPATIENT)
Dept: FAMILY MEDICINE CLINIC | Facility: CLINIC | Age: 44
End: 2024-05-06
Payer: MEDICARE

## 2024-05-08 ENCOUNTER — OFFICE VISIT (OUTPATIENT)
Dept: FAMILY MEDICINE CLINIC | Facility: CLINIC | Age: 44
End: 2024-05-08
Payer: MEDICARE

## 2024-05-08 VITALS
WEIGHT: 195.6 LBS | TEMPERATURE: 97.2 F | DIASTOLIC BLOOD PRESSURE: 80 MMHG | OXYGEN SATURATION: 97 % | SYSTOLIC BLOOD PRESSURE: 122 MMHG | HEART RATE: 114 BPM | BODY MASS INDEX: 35.99 KG/M2 | HEIGHT: 62 IN

## 2024-05-08 DIAGNOSIS — N76.4 ABSCESS OF LABIA MAJORA: Primary | ICD-10-CM

## 2024-05-08 PROCEDURE — 1125F AMNT PAIN NOTED PAIN PRSNT: CPT | Performed by: NURSE PRACTITIONER

## 2024-05-08 PROCEDURE — 1159F MED LIST DOCD IN RCRD: CPT | Performed by: NURSE PRACTITIONER

## 2024-05-08 PROCEDURE — 99213 OFFICE O/P EST LOW 20 MIN: CPT | Performed by: NURSE PRACTITIONER

## 2024-05-08 PROCEDURE — 1160F RVW MEDS BY RX/DR IN RCRD: CPT | Performed by: NURSE PRACTITIONER

## 2024-05-08 RX ORDER — CYCLOBENZAPRINE HCL 10 MG
10 TABLET ORAL 2 TIMES DAILY PRN
COMMUNITY

## 2024-05-08 RX ORDER — FLUCONAZOLE 150 MG/1
150 TABLET ORAL ONCE
Qty: 2 TABLET | Refills: 0 | Status: SHIPPED | OUTPATIENT
Start: 2024-05-08 | End: 2024-05-08

## 2024-05-08 RX ORDER — SULFAMETHOXAZOLE AND TRIMETHOPRIM 800; 160 MG/1; MG/1
1 TABLET ORAL 2 TIMES DAILY
Qty: 20 TABLET | Refills: 0 | Status: SHIPPED | OUTPATIENT
Start: 2024-05-08

## 2024-05-28 ENCOUNTER — PATIENT MESSAGE (OUTPATIENT)
Dept: FAMILY MEDICINE CLINIC | Facility: CLINIC | Age: 44
End: 2024-05-28
Payer: MEDICARE

## 2024-05-28 DIAGNOSIS — N76.4 ABSCESS OF LABIA MAJORA: ICD-10-CM

## 2024-05-28 RX ORDER — SULFAMETHOXAZOLE AND TRIMETHOPRIM 800; 160 MG/1; MG/1
1 TABLET ORAL 2 TIMES DAILY
Qty: 20 TABLET | Refills: 0 | Status: SHIPPED | OUTPATIENT
Start: 2024-05-28

## 2024-05-28 NOTE — TELEPHONE ENCOUNTER
From: Janki Krueger  To: Len Hammond  Sent: 5/28/2024 9:40 AM EDT  Subject: Boil    I finished all of the Bactrim that you prescribed me and the boil went away... it is now coming back yet again in exact same spot. I have an appt with you on June 7th. Do I need to see you before then for the boil or just wait until that appt?

## 2024-05-29 RX ORDER — FLUCONAZOLE 150 MG/1
150 TABLET ORAL ONCE
Qty: 1 TABLET | Refills: 0 | Status: SHIPPED | OUTPATIENT
Start: 2024-05-29 | End: 2024-05-29

## 2024-06-07 ENCOUNTER — OFFICE VISIT (OUTPATIENT)
Dept: FAMILY MEDICINE CLINIC | Facility: CLINIC | Age: 44
End: 2024-06-07
Payer: MEDICARE

## 2024-06-07 VITALS
HEIGHT: 62 IN | BODY MASS INDEX: 35.74 KG/M2 | RESPIRATION RATE: 18 BRPM | TEMPERATURE: 96.6 F | HEART RATE: 98 BPM | DIASTOLIC BLOOD PRESSURE: 72 MMHG | WEIGHT: 194.2 LBS | SYSTOLIC BLOOD PRESSURE: 122 MMHG | OXYGEN SATURATION: 97 %

## 2024-06-07 DIAGNOSIS — E78.00 HIGH CHOLESTEROL: Primary | ICD-10-CM

## 2024-06-07 DIAGNOSIS — K21.9 GASTROESOPHAGEAL REFLUX DISEASE WITHOUT ESOPHAGITIS: ICD-10-CM

## 2024-06-07 DIAGNOSIS — I73.9 PERIPHERAL VASCULAR DISEASE: ICD-10-CM

## 2024-06-07 PROCEDURE — G0439 PPPS, SUBSEQ VISIT: HCPCS | Performed by: NURSE PRACTITIONER

## 2024-06-07 PROCEDURE — 1160F RVW MEDS BY RX/DR IN RCRD: CPT | Performed by: NURSE PRACTITIONER

## 2024-06-07 PROCEDURE — 1126F AMNT PAIN NOTED NONE PRSNT: CPT | Performed by: NURSE PRACTITIONER

## 2024-06-07 PROCEDURE — 99213 OFFICE O/P EST LOW 20 MIN: CPT | Performed by: NURSE PRACTITIONER

## 2024-06-07 PROCEDURE — 1159F MED LIST DOCD IN RCRD: CPT | Performed by: NURSE PRACTITIONER

## 2024-06-07 NOTE — PROGRESS NOTES
The ABCs of the Annual Wellness Visit  Subsequent Medicare Wellness Visit    Subjective    Janki Krueger is a 43 y.o. female who presents for a Subsequent Medicare Wellness Visit.    The following portions of the patient's history were reviewed and   updated as appropriate: allergies, current medications, past family history, past medical history, past social history, past surgical history, and problem list.    Compared to one year ago, the patient feels her physical   health is the same.    Compared to one year ago, the patient feels her mental   health is the same.    Recent Hospitalizations:  She was not admitted to the hospital during the last year.       Current Medical Providers:  Patient Care Team:  Len Hammond APRN as PCP - General (Nurse Practitioner)  Esme Balbuena MD as Consulting Physician (Family Medicine)  Ignacio Junior MD as Consulting Physician (General Surgery)    Outpatient Medications Prior to Visit   Medication Sig Dispense Refill    aspirin 81 MG chewable tablet Chew 1 tablet Daily.      CBD (cannabidiol) oral oil Every 12 (Twelve) Hours.      clopidogrel (PLAVIX) 75 MG tablet Take 1 tablet by mouth Daily for 360 days. 90 tablet 3    HYDROcodone-acetaminophen (NORCO) 7.5-325 MG per tablet Take 1 tablet by mouth Every 8 (Eight) Hours As Needed for Moderate Pain . 90 tablet 0    Melatonin 3 MG tablet dispersible Take 1 tablet by mouth every night at bedtime.      montelukast (SINGULAIR) 10 MG tablet Take 1 tablet by mouth Every Night. 90 tablet 0    omeprazole (priLOSEC) 40 MG capsule Take 1 capsule by mouth Daily. 90 capsule 1    pravastatin (PRAVACHOL) 80 MG tablet TAKE ONE TABLET BY MOUTH EVERY DAY 90 tablet 1    amitriptyline (ELAVIL) 10 MG tablet TAKE ONE TABLET BY MOUTH EVERY EVENING 90 tablet 1    sulfamethoxazole-trimethoprim (Bactrim DS) 800-160 MG per tablet Take 1 tablet by mouth 2 (Two) Times a Day. 20 tablet 0    cyclobenzaprine (FLEXERIL) 10 MG tablet Take 1 tablet by  "mouth 2 (Two) Times a Day As Needed. (Patient not taking: Reported on 5/8/2024)      diclofenac (VOLTAREN) 50 MG EC tablet Take 1 tablet by mouth 2 (Two) Times a Day As Needed (pain). (Patient not taking: Reported on 5/8/2024) 60 tablet 1     No facility-administered medications prior to visit.       Opioid medication/s are on active medication list.  and I have evaluated her active treatment plan and pain score trends (see table).  Vitals:    06/07/24 1251   PainSc: 0-No pain     I have reviewed the chart for potential of high risk medication and harmful drug interactions in the elderly.          Aspirin is on active medication list. Aspirin use is indicated based on review of current medical condition/s. Pros and cons of this therapy have been discussed today. Benefits of this medication outweigh potential harm.  Patient has been encouraged to continue taking this medication.  .      Patient Active Problem List   Diagnosis    Peripheral vascular disease    Neuropathic pain of foot, right    Insomnia    High cholesterol    Urticaria    Idiopathic peripheral neuropathy    Nausea and vomiting     Advance Care Planning   Advance Care Planning     Advance Directive is not on file.  ACP discussion was held with the patient during this visit. Patient does not have an advance directive, declines further assistance.     Objective    Vitals:    06/07/24 1251   BP: 122/72   BP Location: Right arm   Patient Position: Sitting   Cuff Size: Adult   Pulse: 98   Resp: 18   Temp: 96.6 °F (35.9 °C)   TempSrc: Temporal   SpO2: 97%   Weight: 88.1 kg (194 lb 3.2 oz)   Height: 157.5 cm (62.01\")   PainSc: 0-No pain     Estimated body mass index is 35.51 kg/m² as calculated from the following:    Height as of this encounter: 157.5 cm (62.01\").    Weight as of this encounter: 88.1 kg (194 lb 3.2 oz).           Does the patient have evidence of cognitive impairment? No          HEALTH RISK ASSESSMENT    Smoking Status:  Social History "     Tobacco Use   Smoking Status Some Days    Current packs/day: 0.25    Average packs/day: 0.9 packs/day for 28.1 years (26.5 ttl pk-yrs)    Types: Cigarettes    Start date: 1996    Passive exposure: Past   Smokeless Tobacco Never   Tobacco Comments    quit smoking in 2016     Alcohol Consumption:  Social History     Substance and Sexual Activity   Alcohol Use Never     Fall Risk Screen:    LAZ Fall Risk Assessment was completed, and patient is at LOW risk for falls.Assessment completed on:2024    Depression Screenin/7/2024    12:54 PM   PHQ-2/PHQ-9 Depression Screening   Little Interest or Pleasure in Doing Things 0-->not at all   Feeling Down, Depressed or Hopeless 0-->not at all   PHQ-9: Brief Depression Severity Measure Score 0       Health Habits and Functional and Cognitive Screenin/31/2024     7:51 AM   Functional & Cognitive Status   Do you have difficulty preparing food and eating? No   Do you have difficulty bathing yourself, getting dressed or grooming yourself? No   Do you have difficulty using the toilet? No   Do you have difficulty moving around from place to place? Yes   Do you have trouble with steps or getting out of a bed or a chair? Yes   Current Diet Limited Junk Food   Dental Exam Up to date   Eye Exam Up to date   Exercise (times per week) 2 times per week   Current Exercises Include Walking   Do you need help using the phone?  No   Are you deaf or do you have serious difficulty hearing?  No   Do you need help to go to places out of walking distance? Yes   Do you need help shopping? No   Do you need help preparing meals?  No   Do you need help with housework?  Yes   Do you need help with laundry? No   Do you need help taking your medications? No   Do you need help managing money? No   Do you ever drive or ride in a car without wearing a seat belt? No   Have you felt unusual stress, anger or loneliness in the last month? No   Who do you live with? Spouse   If you  need help, do you have trouble finding someone available to you? No   Have you been bothered in the last four weeks by sexual problems? Yes   Do you have difficulty concentrating, remembering or making decisions? Yes       Age-appropriate Screening Schedule:  Refer to the list below for future screening recommendations based on patient's age, sex and/or medical conditions. Orders for these recommended tests are listed in the plan section. The patient has been provided with a written plan.    Health Maintenance   Topic Date Due    COLORECTAL CANCER SCREENING  Never done    Pneumococcal Vaccine 0-64 (1 of 2 - PCV) 06/15/2024 (Originally [birth year]1984)    COVID-19 Vaccine (1 - 2023-24 season) 06/07/2025 (Originally 9/1/2023)    TDAP/TD VACCINES (1 - Tdap) 06/07/2025 (Originally 9/8/1997)    INFLUENZA VACCINE  08/01/2024    LIPID PANEL  11/03/2024    BMI FOLLOWUP  02/23/2025    PAP SMEAR  03/17/2025    MAMMOGRAM  03/21/2025    ANNUAL WELLNESS VISIT  06/07/2025    GASTROSCOPY (EGD)  04/10/2026    HEPATITIS C SCREENING  Completed                  CMS Preventative Services Quick Reference  Risk Factors Identified During Encounter  None Identified  The above risks/problems have been discussed with the patient.  Pertinent information has been shared with the patient in the After Visit Summary.  An After Visit Summary and PPPS were made available to the patient.    Follow Up:   Next Medicare Wellness visit to be scheduled in 1 year.       Additional E&M Note during same encounter follows:  Patient has multiple medical problems which are significant and separately identifiable that require additional work above and beyond the Medicare Wellness Visit.      Chief Complaint  Medicare Wellness-subsequent, Obesity, Headache (Only every had one and wants to know if she needs to stay on it), Allergies, and Heartburn    Subjective        Is on amitriptyline due to having one headache but not ever had another.    Obesity:     Seasonal  "allergies:  doing well    Boil is going away but did before and returned within 1 week after.      Obesity  Associated symptoms include headaches. Pertinent negatives include no chest pain, coughing or fever.   Headache  Allergies  Associated symptoms include headaches. Pertinent negatives include no chest pain, coughing or fever.   Heartburn  She reports no chest pain or no coughing.     Janki Krueger is also being seen today for 6 month and a boil recheck.    Review of Systems   Constitutional:  Negative for fever.   Respiratory:  Negative for cough, chest tightness and shortness of breath.    Cardiovascular:  Negative for chest pain.       Objective   Vital Signs:  /72 (BP Location: Right arm, Patient Position: Sitting, Cuff Size: Adult)   Pulse 98   Temp 96.6 °F (35.9 °C) (Temporal)   Resp 18   Ht 157.5 cm (62.01\")   Wt 88.1 kg (194 lb 3.2 oz)   SpO2 97%   BMI 35.51 kg/m²     Physical Exam  Vitals reviewed.   Constitutional:       Appearance: Normal appearance. She is well-developed.   HENT:      Head: Normocephalic and atraumatic.      Mouth/Throat:      Pharynx: No oropharyngeal exudate.   Eyes:      Conjunctiva/sclera: Conjunctivae normal.      Pupils: Pupils are equal, round, and reactive to light.   Cardiovascular:      Rate and Rhythm: Normal rate and regular rhythm.      Heart sounds: Normal heart sounds. No murmur heard.     No friction rub. No gallop.   Pulmonary:      Effort: Pulmonary effort is normal.      Breath sounds: Normal breath sounds. No wheezing or rhonchi.   Skin:     General: Skin is warm and dry.   Neurological:      Mental Status: She is alert and oriented to person, place, and time.   Psychiatric:         Mood and Affect: Mood and affect normal.         Behavior: Behavior normal.         Thought Content: Thought content normal.         Judgment: Judgment normal.                    The 10-year ASCVD risk score (Edmar DK, et al., 2019) is: 2.6%    Values used to calculate " the score:      Age: 43 years      Sex: Female      Is Non- : No      Diabetic: No      Tobacco smoker: Yes      Systolic Blood Pressure: 122 mmHg      Is BP treated: No      HDL Cholesterol: 42 mg/dL      Total Cholesterol: 157 mg/dL       Assessment and Plan   Diagnoses and all orders for this visit:    1. High cholesterol (Primary)  -     Lipid Panel With / Chol / HDL Ratio; Future  -     Comprehensive Metabolic Panel; Future  -     Lipid Panel With / Chol / HDL Ratio; Future  -     Comprehensive Metabolic Panel; Future    2. Peripheral vascular disease  -     Urinalysis With Culture If Indicated -; Future  -     Urinalysis With Culture If Indicated -; Future    3. Gastroesophageal reflux disease without esophagitis  -     CBC (No Diff); Future  -     CBC (No Diff); Future             Follow Up   Return in about 6 months (around 12/7/2024).  Patient was given instructions and counseling regarding her condition or for health maintenance advice. Please see specific information pulled into the AVS if appropriate.

## 2024-06-19 ENCOUNTER — TELEPHONE (OUTPATIENT)
Dept: VASCULAR SURGERY | Facility: HOSPITAL | Age: 44
End: 2024-06-19
Payer: MEDICARE

## 2024-06-19 ENCOUNTER — TRANSCRIBE ORDERS (OUTPATIENT)
Dept: VASCULAR SURGERY | Facility: HOSPITAL | Age: 44
End: 2024-06-19
Payer: MEDICARE

## 2024-06-19 DIAGNOSIS — J30.2 SEASONAL ALLERGIES: ICD-10-CM

## 2024-06-19 DIAGNOSIS — I73.9 CLAUDICATION: Primary | ICD-10-CM

## 2024-06-19 DIAGNOSIS — R09.81 SINUS CONGESTION: ICD-10-CM

## 2024-06-19 RX ORDER — MONTELUKAST SODIUM 10 MG/1
10 TABLET ORAL
Qty: 90 TABLET | Refills: 0 | Status: SHIPPED | OUTPATIENT
Start: 2024-06-19

## 2024-06-19 NOTE — TELEPHONE ENCOUNTER
"Caller: Janki Krueger \"Nalini\"    Relationship to patient: Self    Best call back number: [phone]    Chief complaint: PT CALLING TO SET UP ANNUAL WITH TESTING.    Type of visit: FOLLOW UP    Requested date: NA    If rescheduling, when is the original appointment: NA    Additional notes:PLEASE CALL BACK PT       "

## 2024-06-25 ENCOUNTER — HOSPITAL ENCOUNTER (OUTPATIENT)
Dept: MAMMOGRAPHY | Facility: HOSPITAL | Age: 44
Discharge: HOME | End: 2024-06-25
Admitting: NURSE PRACTITIONER
Payer: MEDICARE

## 2024-06-25 DIAGNOSIS — Z12.31 VISIT FOR SCREENING MAMMOGRAM: ICD-10-CM

## 2024-06-25 PROCEDURE — 77063 BREAST TOMOSYNTHESIS BI: CPT

## 2024-06-25 PROCEDURE — 77067 SCR MAMMO BI INCL CAD: CPT

## 2024-06-26 DIAGNOSIS — R92.8 ABNORMAL MAMMOGRAM: Primary | ICD-10-CM

## 2024-07-08 ENCOUNTER — HOSPITAL ENCOUNTER (OUTPATIENT)
Dept: ULTRASOUND IMAGING | Facility: HOSPITAL | Age: 44
Discharge: HOME | End: 2024-07-08
Payer: MEDICARE

## 2024-07-08 ENCOUNTER — HOSPITAL ENCOUNTER (OUTPATIENT)
Dept: MAMMOGRAPHY | Facility: HOSPITAL | Age: 44
Discharge: HOME | End: 2024-07-08
Payer: MEDICARE

## 2024-07-08 DIAGNOSIS — R92.8 ABNORMAL MAMMOGRAM: ICD-10-CM

## 2024-07-08 PROCEDURE — 76642 ULTRASOUND BREAST LIMITED: CPT

## 2024-07-08 PROCEDURE — G0279 TOMOSYNTHESIS, MAMMO: HCPCS

## 2024-07-08 PROCEDURE — 77065 DX MAMMO INCL CAD UNI: CPT

## 2024-07-15 DIAGNOSIS — E78.00 HIGH CHOLESTEROL: ICD-10-CM

## 2024-07-15 RX ORDER — PRAVASTATIN SODIUM 80 MG/1
80 TABLET ORAL DAILY
Qty: 90 TABLET | Refills: 1 | Status: SHIPPED | OUTPATIENT
Start: 2024-07-15

## 2024-07-16 ENCOUNTER — PATIENT OUTREACH (OUTPATIENT)
Dept: ONCOLOGY | Facility: HOSPITAL | Age: 44
End: 2024-07-16
Payer: MEDICARE

## 2024-07-16 ENCOUNTER — PATIENT MESSAGE (OUTPATIENT)
Dept: FAMILY MEDICINE CLINIC | Facility: CLINIC | Age: 44
End: 2024-07-16
Payer: MEDICARE

## 2024-07-16 DIAGNOSIS — F32.A ANXIETY AND DEPRESSION: Primary | ICD-10-CM

## 2024-07-16 DIAGNOSIS — F41.9 ANXIETY AND DEPRESSION: Primary | ICD-10-CM

## 2024-07-17 NOTE — TELEPHONE ENCOUNTER
From: Janki Krueger  To: Len Hammond  Sent: 7/16/2024 12:37 PM EDT  Subject: Referral     I'm not sure if I have to have a referral or not, but I have been struggling yet again with anxiety/mood swings and much more. I have heard really great things about Dr Martine Arreguin and would be willing to see him for help if possible. If a referral is needed can you do that for me? Thank You

## 2024-07-22 ENCOUNTER — LAB (OUTPATIENT)
Dept: LAB | Facility: HOSPITAL | Age: 44
End: 2024-07-22
Payer: MEDICARE

## 2024-07-22 ENCOUNTER — HOSPITAL ENCOUNTER (OUTPATIENT)
Dept: MAMMOGRAPHY | Facility: HOSPITAL | Age: 44
Discharge: HOME | End: 2024-07-22
Payer: MEDICARE

## 2024-07-22 ENCOUNTER — ANCILLARY ORDERS (OUTPATIENT)
Dept: MAMMOGRAPHY | Facility: HOSPITAL | Age: 44
End: 2024-07-22
Payer: MEDICARE

## 2024-07-22 DIAGNOSIS — N63.13 MASS OF LOWER OUTER QUADRANT OF RIGHT BREAST: ICD-10-CM

## 2024-07-22 DIAGNOSIS — I73.9 PERIPHERAL VASCULAR DISEASE: ICD-10-CM

## 2024-07-22 DIAGNOSIS — K21.9 GASTROESOPHAGEAL REFLUX DISEASE WITHOUT ESOPHAGITIS: ICD-10-CM

## 2024-07-22 DIAGNOSIS — N63.13 MASS OF LOWER OUTER QUADRANT OF RIGHT BREAST: Primary | ICD-10-CM

## 2024-07-22 DIAGNOSIS — E78.00 HIGH CHOLESTEROL: ICD-10-CM

## 2024-07-22 LAB
ALBUMIN SERPL-MCNC: 4.2 G/DL (ref 3.5–5.2)
ALBUMIN/GLOB SERPL: 1.3 G/DL
ALP SERPL-CCNC: 82 U/L (ref 39–117)
ALT SERPL W P-5'-P-CCNC: 25 U/L (ref 1–33)
ANION GAP SERPL CALCULATED.3IONS-SCNC: 11 MMOL/L (ref 5–15)
AST SERPL-CCNC: 21 U/L (ref 1–32)
BILIRUB SERPL-MCNC: 0.2 MG/DL (ref 0–1.2)
BILIRUB UR QL STRIP: NEGATIVE
BUN SERPL-MCNC: 7 MG/DL (ref 6–20)
BUN/CREAT SERPL: 9 (ref 7–25)
CALCIUM SPEC-SCNC: 9 MG/DL (ref 8.6–10.5)
CHLORIDE SERPL-SCNC: 105 MMOL/L (ref 98–107)
CHOLEST SERPL-MCNC: 141 MG/DL (ref 0–200)
CLARITY UR: CLEAR
CO2 SERPL-SCNC: 23 MMOL/L (ref 22–29)
COLOR UR: ABNORMAL
CREAT SERPL-MCNC: 0.78 MG/DL (ref 0.57–1)
DEPRECATED RDW RBC AUTO: 41.7 FL (ref 37–54)
EGFRCR SERPLBLD CKD-EPI 2021: 95.6 ML/MIN/1.73
ERYTHROCYTE [DISTWIDTH] IN BLOOD BY AUTOMATED COUNT: 12.6 % (ref 12.3–15.4)
GLOBULIN UR ELPH-MCNC: 3.2 GM/DL
GLUCOSE SERPL-MCNC: 106 MG/DL (ref 65–99)
GLUCOSE UR STRIP-MCNC: NEGATIVE MG/DL
HCT VFR BLD AUTO: 46 % (ref 34–46.6)
HDLC SERPL QL: 3.71
HDLC SERPL-MCNC: 38 MG/DL (ref 40–60)
HGB BLD-MCNC: 15.4 G/DL (ref 12–15.9)
HGB UR QL STRIP.AUTO: NEGATIVE
HOLD SPECIMEN: NORMAL
KETONES UR QL STRIP: ABNORMAL
LDLC SERPL CALC-MCNC: 88 MG/DL (ref 0–100)
LEUKOCYTE ESTERASE UR QL STRIP.AUTO: NEGATIVE
MCH RBC QN AUTO: 30.7 PG (ref 26.6–33)
MCHC RBC AUTO-ENTMCNC: 33.5 G/DL (ref 31.5–35.7)
MCV RBC AUTO: 91.8 FL (ref 79–97)
NITRITE UR QL STRIP: NEGATIVE
PH UR STRIP.AUTO: 7 [PH] (ref 5–8)
PLATELET # BLD AUTO: 210 10*3/MM3 (ref 140–450)
PMV BLD AUTO: 10.8 FL (ref 6–12)
POTASSIUM SERPL-SCNC: 4.5 MMOL/L (ref 3.5–5.2)
PROT SERPL-MCNC: 7.4 G/DL (ref 6–8.5)
PROT UR QL STRIP: ABNORMAL
RBC # BLD AUTO: 5.01 10*6/MM3 (ref 3.77–5.28)
SODIUM SERPL-SCNC: 139 MMOL/L (ref 136–145)
SP GR UR STRIP: 1.03 (ref 1–1.03)
TRIGL SERPL-MCNC: 77 MG/DL (ref 0–150)
UROBILINOGEN UR QL STRIP: ABNORMAL
VLDLC SERPL-MCNC: 15 MG/DL (ref 5–40)
WBC NRBC COR # BLD AUTO: 5.55 10*3/MM3 (ref 3.4–10.8)

## 2024-07-22 PROCEDURE — 80053 COMPREHEN METABOLIC PANEL: CPT

## 2024-07-22 PROCEDURE — 88305 TISSUE EXAM BY PATHOLOGIST: CPT | Performed by: RADIOLOGY

## 2024-07-22 PROCEDURE — 80061 LIPID PANEL: CPT

## 2024-07-22 PROCEDURE — 36415 COLL VENOUS BLD VENIPUNCTURE: CPT

## 2024-07-22 PROCEDURE — 81003 URINALYSIS AUTO W/O SCOPE: CPT

## 2024-07-22 PROCEDURE — 25010000002 LIDOCAINE 1 % SOLUTION

## 2024-07-22 PROCEDURE — A4648 IMPLANTABLE TISSUE MARKER: HCPCS

## 2024-07-22 PROCEDURE — 85027 COMPLETE CBC AUTOMATED: CPT

## 2024-07-22 RX ORDER — LIDOCAINE HYDROCHLORIDE 10 MG/ML
INJECTION, SOLUTION INFILTRATION; PERINEURAL
Status: COMPLETED
Start: 2024-07-22 | End: 2024-07-22

## 2024-07-22 RX ORDER — LIDOCAINE HYDROCHLORIDE AND EPINEPHRINE 10; 10 MG/ML; UG/ML
INJECTION, SOLUTION INFILTRATION; PERINEURAL
Status: COMPLETED
Start: 2024-07-22 | End: 2024-07-22

## 2024-07-22 RX ADMIN — LIDOCAINE HYDROCHLORIDE: 10 INJECTION, SOLUTION INFILTRATION; PERINEURAL at 08:52

## 2024-07-22 RX ADMIN — LIDOCAINE HYDROCHLORIDE,EPINEPHRINE BITARTRATE: 10; .01 INJECTION, SOLUTION INFILTRATION; PERINEURAL at 08:52

## 2024-07-24 LAB
CYTO UR: NORMAL
LAB AP CASE REPORT: NORMAL
LAB AP CLINICAL INFORMATION: NORMAL
PATH REPORT.FINAL DX SPEC: NORMAL
PATH REPORT.GROSS SPEC: NORMAL

## 2024-07-25 ENCOUNTER — HOSPITAL ENCOUNTER (OUTPATIENT)
Dept: CARDIOLOGY | Facility: HOSPITAL | Age: 44
Discharge: HOME | End: 2024-07-25
Payer: MEDICARE

## 2024-07-25 ENCOUNTER — OFFICE VISIT (OUTPATIENT)
Dept: VASCULAR SURGERY | Facility: HOSPITAL | Age: 44
End: 2024-07-25
Payer: MEDICARE

## 2024-07-25 VITALS
RESPIRATION RATE: 16 BRPM | TEMPERATURE: 97.5 F | HEART RATE: 96 BPM | DIASTOLIC BLOOD PRESSURE: 79 MMHG | SYSTOLIC BLOOD PRESSURE: 137 MMHG | OXYGEN SATURATION: 98 %

## 2024-07-25 DIAGNOSIS — I73.9 CLAUDICATION: ICD-10-CM

## 2024-07-25 DIAGNOSIS — Z95.828 HISTORY OF AORTOILIOFEMORAL VASCULAR BYPASS: ICD-10-CM

## 2024-07-25 DIAGNOSIS — I70.213 ATHEROSCLEROSIS OF NATIVE ARTERIES OF EXTREMITIES WITH INTERMITTENT CLAUDICATION, BILATERAL LEGS: Primary | ICD-10-CM

## 2024-07-25 LAB
BH CV LOWER ARTERIAL LEFT ABI RATIO: 0.86
BH CV LOWER ARTERIAL LEFT CALF RATIO: 0.9
BH CV LOWER ARTERIAL LEFT DORSALIS PEDIS SYS MAX: 108
BH CV LOWER ARTERIAL LEFT GREAT TOE SYS MAX: 64
BH CV LOWER ARTERIAL LEFT LOW THIGH RATIO: 0.82
BH CV LOWER ARTERIAL LEFT LOW THIGH SYS MAX: 113
BH CV LOWER ARTERIAL LEFT POPLITEAL SYS MAX: 123
BH CV LOWER ARTERIAL LEFT POST TIBIAL SYS MAX: 118
BH CV LOWER ARTERIAL LEFT TBI RATIO: 0.47
BH CV LOWER ARTERIAL RIGHT ABI RATIO: 1.01
BH CV LOWER ARTERIAL RIGHT CALF RATIO: 0.7
BH CV LOWER ARTERIAL RIGHT DORSALIS PEDIS SYS MAX: 137
BH CV LOWER ARTERIAL RIGHT GREAT TOE SYS MAX: 61
BH CV LOWER ARTERIAL RIGHT LOW THIGH RATIO: 0.87
BH CV LOWER ARTERIAL RIGHT LOW THIGH SYS MAX: 119
BH CV LOWER ARTERIAL RIGHT POPLITEAL SYS MAX: 133
BH CV LOWER ARTERIAL RIGHT POST TIBIAL SYS MAX: 139
BH CV LOWER ARTERIAL RIGHT TBI RATIO: 0.45
UPPER ARTERIAL LEFT ARM BRACHIAL SYS MAX: 137
UPPER ARTERIAL RIGHT ARM BRACHIAL SYS MAX: 125

## 2024-07-25 PROCEDURE — 93923 UPR/LXTR ART STDY 3+ LVLS: CPT

## 2024-07-25 NOTE — PROGRESS NOTES
[facility] Vascular Surgery Office Follow Up Note     Date of Encounter: 07/25/2024     MRN Number: [id number]  Name: Janki Krueger  Phone Number: [phone]     Referred By: Juan Barnett APRN  PCP: Len Hammond APRN    Chief Complaint:    Chief Complaint   Patient presents with    Follow-up     Patient is here as  an annual follow up with an arterial doppler today.        Subjective      History of Present Illness:    Janki Krueger is a 43 y.o. female presents for annual follow-up with an arterial Doppler performed today.  She had a aortobifemoral bypass in 2014 for chronic occlusion of the left limb.  She had a revision of the distal anastomosis of the right groin in 2016 and revision of the left ala status post this as well as stenting of the native left iliac artery in 2017.  She continues to have lifestyle limiting intermittent claudication however is content and would prefer not to have an intervention at this time. She has been having more pain in the right hip and had been concerned something was worse.  She continues to take Plavix, aspirin and a statin medication daily.    Review of Systems:  ROS  Review of Systems   Constitutional: Negative.   HENT: Negative.    Cardiovascular: Negative.    Respiratory: Negative.    Skin: Negative.    Musculoskeletal: Negative.    Gastrointestinal: Negative.    Neurological: Negative.    Psychiatric/Behavioral: Negative.      I have reviewed the following portions of the patient's history: problem list, current medications, allergies, past surgical history, past medical history, past social history, past family history, and ROS and confirm it's accurate.    Allergies:  Allergies   Allergen Reactions    Morphine Mental Status Change and Unknown - High Severity       Medications:      Current Outpatient Medications:     aspirin 81 MG chewable tablet, Chew 1 tablet Daily., Disp: , Rfl:     CBD (cannabidiol) oral oil, Every 12 (Twelve) Hours.,  Disp: , Rfl:     clopidogrel (PLAVIX) 75 MG tablet, Take 1 tablet by mouth Daily for 360 days., Disp: 90 tablet, Rfl: 3    HYDROcodone-acetaminophen (NORCO) 7.5-325 MG per tablet, Take 1 tablet by mouth Every 8 (Eight) Hours As Needed for Moderate Pain ., Disp: 90 tablet, Rfl: 0    Melatonin 3 MG tablet dispersible, Take 1 tablet by mouth every night at bedtime., Disp: , Rfl:     montelukast (SINGULAIR) 10 MG tablet, TAKE ONE TABLET BY MOUTH EVERY NIGHT, Disp: 90 tablet, Rfl: 0    omeprazole (priLOSEC) 40 MG capsule, Take 1 capsule by mouth Daily., Disp: 90 capsule, Rfl: 1    pravastatin (PRAVACHOL) 80 MG tablet, TAKE ONE TABLET BY MOUTH EVERY DAY, Disp: 90 tablet, Rfl: 1    cyclobenzaprine (FLEXERIL) 10 MG tablet, Take 1 tablet by mouth 2 (Two) Times a Day As Needed. 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    PVD (peripheral vascular disease)     SEES        Past Surgical History:   Procedure Laterality Date    AORTA FEMORAL BYPASS  2014    REVISION NOV2016, FEM POP 2017     SECTION      ENDOMETRIAL ABLATION  16    ENDOSCOPY N/A 4/10/2023    Procedure: ESOPHAGOGASTRODUODENOSCOPY with biopsies;  Surgeon: Ignacio Junior MD;  Location: [facility] ENDOSCOPY;  Service: General;  Laterality: N/A;  normal EGD        Social History     Socioeconomic History    Marital status:    Tobacco Use    Smoking status: Some Days     Current packs/day: 0.25     Average packs/day: 0.9 packs/day for 28.2 years (26.5 ttl pk-yrs)     Types: Cigarettes     Start date: 1996      Passive exposure: Past    Smokeless tobacco: Never    Tobacco comments:     quit smoking in 2016   Vaping Use    Vaping status: Never Used   Substance and Sexual Activity    Alcohol use: Never    Drug use: Never    Sexual activity: Yes     Partners: Male     Birth control/protection: Vasectomy     Comment:  had vasectomy 16 yrs ago        Family History   Problem Relation Age of Onset    COPD Father     Diabetes Father     Cancer Father         Plasmablastic lymphoma    Hypertension Father     Vision loss Maternal Grandmother     Cancer Paternal Grandmother         Esophagus cancer       Objective     Physical Exam:  Vitals:    07/25/24 1122 07/25/24 1123   BP: 125/81 137/79   BP Location: Right arm Left arm   Patient Position: Lying Lying   Cuff Size: Large Adult Large Adult   Pulse: 96    Resp: 16    Temp: 97.5 °F (36.4 °C)    TempSrc: Temporal    SpO2: 98%    PainSc:   6    PainLoc: Leg       There is no height or weight on file to calculate BMI.    Physical Exam  Physical Exam  Constitutional:       Appearance:.  Alert no acute distress  HENT:      Head: Normocephalic and atraumatic.   Eyes:      Extraocular Movements: Extraocular movements intact.      Pupils: Pupils are equal, round, and reactive to light.   Cardiovascular:      Rate and Rhythm: Normal rate and regular rhythm.   Pulmonary:      Effort: Pulmonary effort is normal.   Musculoskeletal:      Cervical back: Normal range of motion and neck supple.   Skin:     General: Skin is warm and dry.   Neurological:      General: No focal deficit present.      Mental Status: She is alert and oriented to person, place, and time.     Imaging/Labs:  I have reviewed the preliminary results of the arterial Doppler performed today.  Arterial Doppler performed today reveals no significant changes when compared to the prior studies ABIs are 1.01 on the right and 0.86 on the left toe brachial index remain 0.45 and 0.47.    Assessment / Plan      Assessment /  Plan:  Diagnoses and all orders for this visit:    1. Atherosclerosis of native arteries of extremities with intermittent claudication, bilateral legs (Primary)    2. History of aortoiliofemoral vascular bypass     Ms. Krueger has stable aortoiliac occlusive disease, she continues to have intermittent claudication however does not feel its limiting enough to have a intervention at this time. We will continue to follow, I recommend a follow up in one year unless she has any additional concerns or worsening signs or symptoms that she may follow-up sooner.    I have answered all of her questions and she is in agreement with the plan at this time.  Thank you for allowing me to participate in your patient's care.        Follow Up:   No follow-ups on file.   Or sooner for any further concerns or worsening sign and symptoms. If unable to reach us in the office please dial 911 or go to the nearest emergency department.      Juan FINNEY  [facility] Vascular Surgery

## 2024-09-03 ENCOUNTER — PATIENT MESSAGE (OUTPATIENT)
Dept: VASCULAR SURGERY | Facility: HOSPITAL | Age: 44
End: 2024-09-03
Payer: MEDICARE

## 2024-09-03 DIAGNOSIS — I70.213 ATHEROSCLEROSIS OF NATIVE ARTERIES OF EXTREMITIES WITH INTERMITTENT CLAUDICATION, BILATERAL LEGS: Primary | ICD-10-CM

## 2024-09-03 DIAGNOSIS — Z95.828 HISTORY OF AORTOILIOFEMORAL VASCULAR BYPASS: ICD-10-CM

## 2024-09-13 DIAGNOSIS — R09.81 SINUS CONGESTION: ICD-10-CM

## 2024-09-13 DIAGNOSIS — J30.2 SEASONAL ALLERGIES: ICD-10-CM

## 2024-09-13 RX ORDER — MONTELUKAST SODIUM 10 MG/1
10 TABLET ORAL
Qty: 90 TABLET | Refills: 0 | Status: SHIPPED | OUTPATIENT
Start: 2024-09-13

## 2024-09-17 ENCOUNTER — OFFICE VISIT (OUTPATIENT)
Dept: SURGERY | Facility: CLINIC | Age: 44
End: 2024-09-17
Payer: MEDICARE

## 2024-09-17 ENCOUNTER — PREP FOR SURGERY (OUTPATIENT)
Dept: OTHER | Facility: HOSPITAL | Age: 44
End: 2024-09-17
Payer: MEDICARE

## 2024-09-17 VITALS
DIASTOLIC BLOOD PRESSURE: 77 MMHG | WEIGHT: 190 LBS | SYSTOLIC BLOOD PRESSURE: 127 MMHG | HEIGHT: 62 IN | HEART RATE: 88 BPM | BODY MASS INDEX: 34.96 KG/M2

## 2024-09-17 DIAGNOSIS — Z12.11 SCREENING FOR MALIGNANT NEOPLASM OF COLON: Primary | ICD-10-CM

## 2024-09-17 RX ORDER — SODIUM CHLORIDE 0.9 % (FLUSH) 0.9 %
3 SYRINGE (ML) INJECTION EVERY 12 HOURS SCHEDULED
OUTPATIENT
Start: 2024-09-17

## 2024-09-17 RX ORDER — SODIUM CHLORIDE 9 MG/ML
40 INJECTION, SOLUTION INTRAVENOUS AS NEEDED
OUTPATIENT
Start: 2024-09-17

## 2024-09-17 RX ORDER — SODIUM CHLORIDE 0.9 % (FLUSH) 0.9 %
10 SYRINGE (ML) INJECTION AS NEEDED
OUTPATIENT
Start: 2024-09-17

## 2024-09-17 RX ORDER — PHENTERMINE HYDROCHLORIDE 30 MG/1
30 CAPSULE ORAL EVERY MORNING
COMMUNITY

## 2024-09-18 ENCOUNTER — HOSPITAL ENCOUNTER (OUTPATIENT)
Dept: CT IMAGING | Facility: HOSPITAL | Age: 44
Discharge: HOME | End: 2024-09-18
Admitting: NURSE PRACTITIONER
Payer: MEDICARE

## 2024-09-18 DIAGNOSIS — I70.213 ATHEROSCLEROSIS OF NATIVE ARTERIES OF EXTREMITIES WITH INTERMITTENT CLAUDICATION, BILATERAL LEGS: ICD-10-CM

## 2024-09-18 DIAGNOSIS — Z95.828 HISTORY OF AORTOILIOFEMORAL VASCULAR BYPASS: ICD-10-CM

## 2024-09-18 PROCEDURE — 25510000001 IOPAMIDOL PER 1 ML: Performed by: NURSE PRACTITIONER

## 2024-09-18 PROCEDURE — 75635 CT ANGIO ABDOMINAL ARTERIES: CPT

## 2024-09-18 RX ORDER — IOPAMIDOL 755 MG/ML
100 INJECTION, SOLUTION INTRAVASCULAR
Status: COMPLETED | OUTPATIENT
Start: 2024-09-18 | End: 2024-09-18

## 2024-09-18 RX ADMIN — IOPAMIDOL 100 ML: 755 INJECTION, SOLUTION INTRAVENOUS at 15:57

## 2024-09-23 ENCOUNTER — OFFICE VISIT (OUTPATIENT)
Dept: VASCULAR SURGERY | Facility: HOSPITAL | Age: 44
End: 2024-09-23
Payer: MEDICARE

## 2024-09-23 VITALS
RESPIRATION RATE: 18 BRPM | OXYGEN SATURATION: 97 % | TEMPERATURE: 97.2 F | SYSTOLIC BLOOD PRESSURE: 142 MMHG | DIASTOLIC BLOOD PRESSURE: 78 MMHG | HEART RATE: 76 BPM

## 2024-09-23 DIAGNOSIS — G95.19 INTERMITTENT SPINAL CLAUDICATION: Primary | ICD-10-CM

## 2024-09-23 PROCEDURE — 1159F MED LIST DOCD IN RCRD: CPT | Performed by: SURGERY

## 2024-09-23 PROCEDURE — 1160F RVW MEDS BY RX/DR IN RCRD: CPT | Performed by: SURGERY

## 2024-09-23 PROCEDURE — 99213 OFFICE O/P EST LOW 20 MIN: CPT | Performed by: SURGERY

## 2024-09-23 PROCEDURE — G0463 HOSPITAL OUTPT CLINIC VISIT: HCPCS | Performed by: SURGERY

## 2024-09-30 NOTE — PATIENT INSTRUCTIONS
1.  Please return to clinic at your next scheduled visit.  Contact the clinic ([phone]) at least 24 hours prior in the event you need to cancel.  2.  Do no harm to yourself or others.    3.  Avoid alcohol and drugs.    4.  Take all medications as prescribed.  Please contact the clinic with any concerns. If you are in need of medication refills, please call the clinic at [phone].    5. Should you want to get in touch with your provider, Dr. Martine Guzman, please utilize [employer] or contact the office ([phone]), and staff will be able to page Dr. Guzman directly.  6.  In the event you have personal crisis, contact the following crisis numbers: Suicide Prevention Hotline 1-473.334.9229; [facility] Helpline 8-348-700-ERIKA; [facility] Emergency Room [phone]; text HELLO to 168245; or 294.

## 2024-09-30 NOTE — PROGRESS NOTES
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 the defined types were placed in this encounter.      Return in about 30 days (around 10/31/2024) for 12:40.         This document has been electronically signed by Martine Guzman MD  October 1, 2024 13:58 EDT    Dictated Utilizing Dragon Dictation: Part of this note may be an electronic transcription/translation of spoken language to printed text using the Dragon Dictation System.

## 2024-10-01 ENCOUNTER — LAB (OUTPATIENT)
Dept: LAB | Facility: HOSPITAL | Age: 44
End: 2024-10-01
Payer: MEDICARE

## 2024-10-01 ENCOUNTER — OFFICE VISIT (OUTPATIENT)
Dept: PSYCHIATRY | Facility: CLINIC | Age: 44
End: 2024-10-01
Payer: MEDICARE

## 2024-10-01 VITALS
HEIGHT: 62 IN | BODY MASS INDEX: 35.41 KG/M2 | HEART RATE: 104 BPM | DIASTOLIC BLOOD PRESSURE: 75 MMHG | WEIGHT: 192.4 LBS | SYSTOLIC BLOOD PRESSURE: 136 MMHG

## 2024-10-01 DIAGNOSIS — Z79.899 OTHER LONG TERM (CURRENT) DRUG THERAPY: ICD-10-CM

## 2024-10-01 DIAGNOSIS — K21.9 GASTROESOPHAGEAL REFLUX DISEASE WITHOUT ESOPHAGITIS: ICD-10-CM

## 2024-10-01 DIAGNOSIS — F90.0 ADHD (ATTENTION DEFICIT HYPERACTIVITY DISORDER), INATTENTIVE TYPE: Primary | ICD-10-CM

## 2024-10-01 DIAGNOSIS — F41.1 GENERALIZED ANXIETY DISORDER: ICD-10-CM

## 2024-10-01 DIAGNOSIS — F51.05 INSOMNIA DUE TO MENTAL CONDITION: ICD-10-CM

## 2024-10-01 DIAGNOSIS — F41.0 PANIC ATTACKS: ICD-10-CM

## 2024-10-01 DIAGNOSIS — F33.1 MAJOR DEPRESSIVE DISORDER, RECURRENT EPISODE, MODERATE: ICD-10-CM

## 2024-10-01 LAB
AMPHET+METHAMPHET UR QL: POSITIVE
AMPHETAMINES UR QL: NEGATIVE
BARBITURATES UR QL SCN: NEGATIVE
BENZODIAZ UR QL SCN: NEGATIVE
BUPRENORPHINE SERPL-MCNC: NEGATIVE NG/ML
CANNABINOIDS SERPL QL: POSITIVE
COCAINE UR QL: NEGATIVE
FENTANYL UR-MCNC: NEGATIVE NG/ML
METHADONE UR QL SCN: NEGATIVE
OPIATES UR QL: NEGATIVE
OXYCODONE UR QL SCN: NEGATIVE
PCP UR QL SCN: NEGATIVE
TRICYCLICS UR QL SCN: NEGATIVE

## 2024-10-01 PROCEDURE — 90792 PSYCH DIAG EVAL W/MED SRVCS: CPT | Performed by: STUDENT IN AN ORGANIZED HEALTH CARE EDUCATION/TRAINING PROGRAM

## 2024-10-01 PROCEDURE — 1159F MED LIST DOCD IN RCRD: CPT | Performed by: STUDENT IN AN ORGANIZED HEALTH CARE EDUCATION/TRAINING PROGRAM

## 2024-10-01 PROCEDURE — 80307 DRUG TEST PRSMV CHEM ANLYZR: CPT

## 2024-10-01 PROCEDURE — 1160F RVW MEDS BY RX/DR IN RCRD: CPT | Performed by: STUDENT IN AN ORGANIZED HEALTH CARE EDUCATION/TRAINING PROGRAM

## 2024-10-01 NOTE — TELEPHONE ENCOUNTER
UPCOMING APPTS  No upcoming appointments  LAST OFFICE VISIT - THIS DEPT  6/7/2024 Len Hammond, APRN

## 2024-10-01 NOTE — PLAN OF CARE
[facility] to help manage depression and anxiety related to relationships, family conflict, grief, spending sprees and the trouble they cause

## 2024-10-02 RX ORDER — OMEPRAZOLE 40 MG/1
40 CAPSULE, DELAYED RELEASE ORAL DAILY
Qty: 90 CAPSULE | Refills: 1 | Status: SHIPPED | OUTPATIENT
Start: 2024-10-02

## 2024-10-02 RX ORDER — DEXTROAMPHETAMINE SACCHARATE, AMPHETAMINE ASPARTATE MONOHYDRATE, DEXTROAMPHETAMINE SULFATE AND AMPHETAMINE SULFATE 3.75; 3.75; 3.75; 3.75 MG/1; MG/1; MG/1; MG/1
15 CAPSULE, EXTENDED RELEASE ORAL EVERY MORNING
Qty: 30 CAPSULE | Refills: 0 | Status: SHIPPED | OUTPATIENT
Start: 2024-10-02

## 2024-10-22 ENCOUNTER — TELEPHONE (OUTPATIENT)
Dept: SURGERY | Facility: CLINIC | Age: 44
End: 2024-10-22
Payer: MEDICARE

## 2024-10-22 NOTE — TELEPHONE ENCOUNTER
Pt called back and was given the instructions on holding the plavix. She voiced an understanding.

## 2024-10-22 NOTE — TELEPHONE ENCOUNTER
I lmom for the pt to call the office back. Please let the Pt know that I got his clearance back and its ok to hold her Plavix 5 days prior to her scope.

## 2024-10-28 ENCOUNTER — TELEPHONE (OUTPATIENT)
Dept: SURGERY | Facility: CLINIC | Age: 44
End: 2024-10-28
Payer: MEDICARE

## 2024-10-31 ENCOUNTER — OFFICE VISIT (OUTPATIENT)
Dept: FAMILY MEDICINE CLINIC | Facility: CLINIC | Age: 44
End: 2024-10-31
Payer: MEDICARE

## 2024-10-31 ENCOUNTER — TELEPHONE (OUTPATIENT)
Dept: PSYCHIATRY | Facility: CLINIC | Age: 44
End: 2024-10-31

## 2024-10-31 ENCOUNTER — OFFICE VISIT (OUTPATIENT)
Dept: PSYCHIATRY | Facility: CLINIC | Age: 44
End: 2024-10-31
Payer: MEDICARE

## 2024-10-31 VITALS
SYSTOLIC BLOOD PRESSURE: 144 MMHG | DIASTOLIC BLOOD PRESSURE: 72 MMHG | HEIGHT: 62 IN | HEART RATE: 104 BPM | WEIGHT: 191 LBS | BODY MASS INDEX: 35.15 KG/M2

## 2024-10-31 VITALS
BODY MASS INDEX: 34.93 KG/M2 | TEMPERATURE: 97.3 F | DIASTOLIC BLOOD PRESSURE: 78 MMHG | OXYGEN SATURATION: 95 % | HEART RATE: 62 BPM | SYSTOLIC BLOOD PRESSURE: 138 MMHG | WEIGHT: 191 LBS | RESPIRATION RATE: 14 BRPM

## 2024-10-31 DIAGNOSIS — F33.1 MAJOR DEPRESSIVE DISORDER, RECURRENT EPISODE, MODERATE: ICD-10-CM

## 2024-10-31 DIAGNOSIS — F90.0 ADHD (ATTENTION DEFICIT HYPERACTIVITY DISORDER), INATTENTIVE TYPE: ICD-10-CM

## 2024-10-31 DIAGNOSIS — R35.0 URINARY FREQUENCY: ICD-10-CM

## 2024-10-31 DIAGNOSIS — J01.10 ACUTE NON-RECURRENT FRONTAL SINUSITIS: Primary | ICD-10-CM

## 2024-10-31 DIAGNOSIS — F51.05 INSOMNIA DUE TO MENTAL CONDITION: ICD-10-CM

## 2024-10-31 DIAGNOSIS — F41.1 GENERALIZED ANXIETY DISORDER: Primary | ICD-10-CM

## 2024-10-31 DIAGNOSIS — Z79.899 OTHER LONG TERM (CURRENT) DRUG THERAPY: ICD-10-CM

## 2024-10-31 DIAGNOSIS — F41.0 PANIC ATTACKS: ICD-10-CM

## 2024-10-31 LAB
BILIRUB BLD-MCNC: NEGATIVE MG/DL
CLARITY, POC: CLEAR
COLOR UR: YELLOW
EXPIRATION DATE: ABNORMAL
GLUCOSE UR STRIP-MCNC: NEGATIVE MG/DL
KETONES UR QL: NEGATIVE
LEUKOCYTE EST, POC: NEGATIVE
Lab: ABNORMAL
NITRITE UR-MCNC: NEGATIVE MG/ML
PH UR: 7 [PH] (ref 5–8)
PROT UR STRIP-MCNC: NEGATIVE MG/DL
RBC # UR STRIP: ABNORMAL /UL
SP GR UR: 1.02 (ref 1–1.03)
UROBILINOGEN UR QL: ABNORMAL

## 2024-10-31 PROCEDURE — 81003 URINALYSIS AUTO W/O SCOPE: CPT | Performed by: NURSE PRACTITIONER

## 2024-10-31 PROCEDURE — 1126F AMNT PAIN NOTED NONE PRSNT: CPT | Performed by: NURSE PRACTITIONER

## 2024-10-31 PROCEDURE — 1160F RVW MEDS BY RX/DR IN RCRD: CPT | Performed by: NURSE PRACTITIONER

## 2024-10-31 PROCEDURE — 1159F MED LIST DOCD IN RCRD: CPT | Performed by: NURSE PRACTITIONER

## 2024-10-31 PROCEDURE — 99213 OFFICE O/P EST LOW 20 MIN: CPT | Performed by: NURSE PRACTITIONER

## 2024-10-31 RX ORDER — SULFAMETHOXAZOLE AND TRIMETHOPRIM 800; 160 MG/1; MG/1
1 TABLET ORAL 2 TIMES DAILY
Qty: 20 TABLET | Refills: 0 | Status: SHIPPED | OUTPATIENT
Start: 2024-10-31

## 2024-10-31 RX ORDER — DEXTROAMPHETAMINE SACCHARATE, AMPHETAMINE ASPARTATE MONOHYDRATE, DEXTROAMPHETAMINE SULFATE AND AMPHETAMINE SULFATE 6.25; 6.25; 6.25; 6.25 MG/1; MG/1; MG/1; MG/1
25 CAPSULE, EXTENDED RELEASE ORAL DAILY
Qty: 30 CAPSULE | Refills: 0 | Status: SHIPPED | OUTPATIENT
Start: 2024-10-31

## 2024-10-31 RX ORDER — DEXTROAMPHETAMINE SACCHARATE, AMPHETAMINE ASPARTATE MONOHYDRATE, DEXTROAMPHETAMINE SULFATE AND AMPHETAMINE SULFATE 6.25; 6.25; 6.25; 6.25 MG/1; MG/1; MG/1; MG/1
25 CAPSULE, EXTENDED RELEASE ORAL DAILY
Qty: 30 CAPSULE | Refills: 0 | Status: SHIPPED | OUTPATIENT
Start: 2024-10-31 | End: 2024-10-31 | Stop reason: SDUPTHER

## 2024-10-31 RX ORDER — BROMPHENIRAMINE MALEATE, PSEUDOEPHEDRINE HYDROCHLORIDE, AND DEXTROMETHORPHAN HYDROBROMIDE 2; 30; 10 MG/5ML; MG/5ML; MG/5ML
5 SYRUP ORAL 4 TIMES DAILY PRN
Qty: 473 ML | Refills: 0 | Status: SHIPPED | OUTPATIENT
Start: 2024-10-31

## 2024-10-31 RX ORDER — FLUCONAZOLE 150 MG/1
150 TABLET ORAL DAILY
Qty: 2 TABLET | Refills: 0 | Status: SHIPPED | OUTPATIENT
Start: 2024-10-31 | End: 2024-11-01

## 2024-10-31 NOTE — PROGRESS NOTES
Chief Complaint  Nasal Congestion, Headache, Sinusitis, Ear Fullness, and Urinary Tract Infection (Wants checked for an uti)    Subjective        Janki Krueger presents to [facility]  History of Present Illness  Having urinary symtoms and would like checked for UTI.    States was seen at Urgent care 10 days ago and was placed doxycycline.  Notes didn't finish due to nausea.  Finished steroid that helped but symptoms returned after finishing.  But these symptoms are a little different.  This times throat feels swollen and having drainage PND.  Cough and is green sputum.  Taking allergy medication singulair.  Has also taken sudafed a few times.  Did run a fever 2 weeks ago.    Headache  Sinusitis  Associated symptoms include headaches. Pertinent negatives include no coughing or shortness of breath.   Ear Fullness   Associated symptoms include headaches. Pertinent negatives include no coughing.   Urinary Tract Infection         The following portions of the patient's history were personally reviewed and updated as appropriate: allergies, current medications, past medical history, past surgical history, past family history, and past social history.     Body mass index is 34.93 kg/m².           Past History:    Medical History: has a past medical history of ADHD (attention deficit hyperactivity disorder) (), Anemia, Anxiety, Chronic pain disorder (), Contact lens/glasses fitting, Depression, GERD (gastroesophageal reflux disease), Heartburn, Hyperlipidemia, Nausea, Neuropathy, Obesity, Peripheral neuropathy (), PONV (postoperative nausea and vomiting), and PVD (peripheral vascular disease).     Surgical History: has a past surgical history that includes Aorta Femoral Bypass (2014);  section; Endometrial ablation (16); Esophagogastroduodenoscopy (N/A, 04/10/2023); Abdominal surgery (); and Vascular surgery ().     Family History: family history  includes COPD in her father; Cancer in her father and paternal grandmother; Dementia in her maternal grandmother; Diabetes in her father; Hypertension in her father; No Known Problems in her brother, cousin, maternal aunt, maternal grandfather, maternal uncle, mother, paternal aunt, paternal uncle, sister, and another family member; Seizures in her paternal grandfather; Vision loss in her maternal grandmother.     Social History: reports that she has been smoking cigarettes. She started smoking about 28 years ago. She has a 26.6 pack-year smoking history. She has been exposed to tobacco smoke. She has never used smokeless tobacco. She reports that she does not drink alcohol and does not use drugs.    Allergies: Morphine          Current Outpatient Medications:     amphetamine-dextroamphetamine XR (Adderall XR) 15 MG 24 hr capsule, Take 1 capsule by mouth Every Morning, Disp: 30 capsule, Rfl: 0    aspirin 81 MG chewable tablet, Chew 1 tablet Daily., Disp: , Rfl:     brompheniramine-pseudoephedrine-DM 30-2-10 MG/5ML syrup, Take 5 mL by mouth 4 (Four) Times a Day As Needed for Allergies., Disp: 473 mL, Rfl: 0    CBD (cannabidiol) oral oil, Every 12 (Twelve) Hours., Disp: , Rfl:     clopidogrel (PLAVIX) 75 MG tablet, Take 1 tablet by mouth Daily for 360 days., Disp: 90 tablet, Rfl: 3    fluconazole (DIFLUCAN) 150 MG tablet, Take 1 tablet by mouth Daily for 1 dose. Repeat in 4 days if needed., Disp: 2 tablet, Rfl: 0    HYDROcodone-acetaminophen (NORCO) 7.5-325 MG per tablet, Take 1 tablet by mouth Every 8 (Eight) Hours As Needed for Moderate Pain ., Disp: 90 tablet, Rfl: 0    omeprazole (priLOSEC) 40 MG capsule, TAKE 1 CAPSULE BY MOUTH EVERY DAY, Disp: 90 capsule, Rfl: 1    pravastatin (PRAVACHOL) 80 MG tablet, TAKE ONE TABLET BY MOUTH EVERY DAY, Disp: 90 tablet, Rfl: 1    sulfamethoxazole-trimethoprim (Bactrim DS) 800-160 MG per tablet, Take 1 tablet by mouth 2 (Two) Times a Day., Disp: 20 tablet, Rfl: 0    Medications  Discontinued During This Encounter   Medication Reason    Melatonin 3 MG tablet dispersible *Therapy completed    doxycycline (VIBRAMYCIN) 100 MG capsule *Therapy completed    fluconazole (Diflucan) 150 MG tablet *Therapy completed         Review of Systems   Constitutional:  Negative for fever.   Respiratory:  Negative for cough and shortness of breath.    Cardiovascular:  Negative for chest pain, palpitations and leg swelling.   Neurological:  Negative for dizziness, weakness, numbness and headache.        Objective         Vitals:    10/31/24 0916   BP: 138/78   BP Location: Right arm   Patient Position: Sitting   Cuff Size: Adult   Pulse: 62   Resp: 14   Temp: 97.3 °F (36.3 °C)   TempSrc: Temporal   SpO2: 95%   Weight: 86.6 kg (191 lb)     Body mass index is 34.93 kg/m².         Physical Exam  Vitals reviewed.   Constitutional:       Appearance: Normal appearance. She is well-developed.   HENT:      Head: Normocephalic and atraumatic.      Mouth/Throat:      Pharynx: No oropharyngeal exudate.   Eyes:      Conjunctiva/sclera: Conjunctivae normal.      Pupils: Pupils are equal, round, and reactive to light.   Cardiovascular:      Rate and Rhythm: Normal rate and regular rhythm.      Heart sounds: Normal heart sounds. No murmur heard.     No friction rub. No gallop.   Pulmonary:      Effort: Pulmonary effort is normal.      Breath sounds: Normal breath sounds. No wheezing or rhonchi.   Skin:     General: Skin is warm and dry.   Neurological:      Mental Status: She is alert and oriented to person, place, and time.   Psychiatric:         Mood and Affect: Mood and affect normal.         Behavior: Behavior normal.         Thought Content: Thought content normal.         Judgment: Judgment normal.             Result Review :               Assessment and Plan     Diagnoses and all orders for this visit:    1. Acute non-recurrent frontal sinusitis (Primary)  -     brompheniramine-pseudoephedrine-DM 30-2-10 MG/5ML syrup;  Take 5 mL by mouth 4 (Four) Times a Day As Needed for Allergies.  Dispense: 473 mL; Refill: 0  -     sulfamethoxazole-trimethoprim (Bactrim DS) 800-160 MG per tablet; Take 1 tablet by mouth 2 (Two) Times a Day.  Dispense: 20 tablet; Refill: 0  -     fluconazole (DIFLUCAN) 150 MG tablet; Take 1 tablet by mouth Daily for 1 dose. Repeat in 4 days if needed.  Dispense: 2 tablet; Refill: 0    2. Urinary frequency  -     POC Urinalysis Dipstick, Automated              Follow Up     Return for Next scheduled follow up.    Patient was given instructions and counseling regarding her condition or for health maintenance advice. Please see specific information pulled into the AVS if appropriate.

## 2024-10-31 NOTE — PATIENT INSTRUCTIONS
1.  Please return to clinic at your next scheduled visit.  Contact the clinic ([phone]) at least 24 hours prior in the event you need to cancel.  2.  Do no harm to yourself or others.    3.  Avoid alcohol and drugs.    4.  Take all medications as prescribed.  Please contact the clinic with any concerns. If you are in need of medication refills, please call the clinic at [phone].    5. Should you want to get in touch with your provider, Dr. Martine Guzman, please utilize [employer] or contact the office ([phone]), and staff will be able to page Dr. Guzman directly.  6.  In the event you have personal crisis, contact the following crisis numbers: Suicide Prevention Hotline 1-698.456.3711; [facility] Helpline 4-749-611-ERIKA; [facility] Emergency Room [phone]; text HELLO to 834895; or 755.

## 2024-10-31 NOTE — PROGRESS NOTES
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  F51.05 300.9     327.02   5. ADHD (attention deficit hyperactivity disorder), inattentive type  F90.0 314.00   6. Other long term (current) drug therapy  Z79.899 V58.69     10/31/2024: Improving, increase XR for ADHD. Explored her compulsive buying, which has improved on adderall. \"I feel better.\"  has noticed the improvement.    Allowed patient to freely discuss and process issues, such as:  Anxiety and depression regarding compulsive buying/behavior.  Anxiety and depression regarding family's well-being.  ... using Rogerian psychotherapeutic techniques including unconditional positive regard, reflective listening, and demonstrating clear empathy, with the goal of ameliorating symptoms and maintaining, restoring, or improving self-esteem, adaptive skills, and ego or psychological functions (Anu et al. 1991), the long-term goal of which is to develop a better, healthier perspective and help the patient bear their circumstances more easily.  Time (minutes) spent providing supportive psychotherapy: 16  (This time is exclusive to the therapy session and separate from the time spent on activities used to meet the criteria for the E/M service (history, exam, medical decision-making).)  Start: " 12:43  Stop: 12:59  Functional status: mild impairment  Treatment plan: Medication management and supportive psychotherapy  Prognosis: good  Progress: improving  4w    10/01/2024: R/O PTSD. For ADHD, UDS, CSA, start adderall once get UDS back. Consider therapy, and soon. Pt to STOP phentermine. 4w    PLAN:  Safety: No acute safety concerns  Therapy: None, but may later for possible PTSD  Risk Assessment: Risk of self-harm acutely is moderate.  Risk factors include anxiety disorder, mood disorder, access to firearms, and recent psychosocial stressors (pandemic). Protective factors include no family history, no present SI, no history of suicide attempts or self-harm in the past, minimal AODA, healthcare seeking, future orientation, willingness to engage in care.  Risk of self-harm chronically is also moderate, but could be further elevated in the event of treatment noncompliance and/or AODA.  Meds:  INCREASE adderall XR 15 to 25 mg qam. Risks, benefits, side effects discussed with patient including elevated heart rate, elevated blood pressure, irritability, insomnia, sexual dysfunction, appetite suppressing properties, psychosis.  After discussion of these risks and benefits, the patient voiced understanding and agreed to proceed. Jared reviewed, UDS ordered, and controlled substance agreement signed & witnessed.  Labs: UDS approp 10/24    Patient screened positive for depression based on a PHQ-9 score of  on . Follow-up recommendations include: Prescribed antidepressant medication treatment and Suicide Risk Assessment performed.           TREATMENT PLAN/GOALS: Continue supportive psychotherapy efforts and medications as indicated. Treatment and medication options discussed during today's visit. Patient acknowledged and verbally consented to continue with current treatment plan and was educated on the importance of compliance with treatment and follow-up appointments.    MEDICATION ISSUES:  JARED reviewed as  expected.  Discussed medication options and treatment plan of prescribed medication as well as the risks, benefits, and side effects including potential falls, possible impaired driving and metabolic adversities among others. Patient is agreeable to call the office with any worsening of symptoms or onset of side effects. Patient is agreeable to call 911 or go to the nearest ER should he/she begin having SI/HI. No medication side effects or related complaints today.     MEDS ORDERED DURING VISIT:  New Medications Ordered This Visit   Medications    amphetamine-dextroamphetamine XR (ADDERALL XR) 25 MG 24 hr capsule     Sig: Take 1 capsule by mouth Daily     Dispense:  30 capsule     Refill:  0       Return in about 4 weeks (around 11/28/2024).         This document has been electronically signed by Martine Guzman MD  October 31, 2024 13:03 EDT    Dictated Utilizing Dragon Dictation: Part of this note may be an electronic transcription/translation of spoken language to printed text using the Dragon Dictation System.

## 2024-10-31 NOTE — TELEPHONE ENCOUNTER
PT RETURNED BACK TO THE OFFICE.    PT STATES THAT [facility] REFUSED TO TELL HER IF THEY HAVE ADDERALL XR 25 MG CAPSULE WHEN SHE TRIED TO LOCATE IT THERE. THAT SHE WOULD HAVE TO BRING IN A PHYSICAL; PRESCRIPTION BUT THEY COULDN'T TELL HER IF THEY HAD ANY IN STOCK..     amphetamine-dextroamphetamine XR (ADDERALL XR) 25 MG 24 hr capsule (10/31/2024)     PT TRIED [facility] TO LOCATE TH MEDICATION.    [facility] INFORMED HER THAT THEY CAN'T GET THE MEDICATION. PT CURRENT PRESCRIPTION IS AT [facility].    PT ASKING FOR ASSISTANCE LOCATING THE MEDICATION.     PT IS WILLING TO GO TO [place] OR [facility]. WORST CASE SHE CAN GO TO [place] OR [place] TO GET IT.

## 2024-10-31 NOTE — TELEPHONE ENCOUNTER
I CALLED AND SPOKE TO THE PHARMACIST AT [facility] IN [facility].    THEY DO HAVE A FULL 30 DAY SUPPLY OF PTS ADDERALL XR 25 MG CAPSULES GENERIC IN STOCK.    I HAVE PENDED THE MEDICATION.

## 2024-11-01 NOTE — TELEPHONE ENCOUNTER
I have called the Pt to see what part of the prep made her sick. She said it was the 4 dulcolax tablets. I asked if she would like to reschedule and Pt denied at this time. I will let the referring provider know and send a certified letter to the Pt.

## 2024-12-02 DIAGNOSIS — F90.0 ADHD (ATTENTION DEFICIT HYPERACTIVITY DISORDER), INATTENTIVE TYPE: ICD-10-CM

## 2024-12-02 RX ORDER — DEXTROAMPHETAMINE SACCHARATE, AMPHETAMINE ASPARTATE MONOHYDRATE, DEXTROAMPHETAMINE SULFATE AND AMPHETAMINE SULFATE 6.25; 6.25; 6.25; 6.25 MG/1; MG/1; MG/1; MG/1
25 CAPSULE, EXTENDED RELEASE ORAL DAILY
Qty: 30 CAPSULE | Refills: 0 | Status: SHIPPED | OUTPATIENT
Start: 2024-12-02 | End: 2024-12-04 | Stop reason: SDUPTHER

## 2024-12-02 NOTE — TELEPHONE ENCOUNTER
REFILL REQUEST:     amphetamine-dextroamphetamine XR (ADDERALL XR) 25 MG 24 hr capsule (10/31/2024)     F/UP- 12/04/2024.  LOV: 10/31/2024.    LAST UDS:  Urine Drug Screen - Urine, Clean Catch (10/01/2024 14:30)     PREFERRED PHARMACY IS [facility].    PT REQUESTS A RETURN CALL WHEN THIS IS SENT INTO THE PHARMACY SO AS SHE HAS TO COME TO [facility] TODAY.

## 2024-12-04 ENCOUNTER — OFFICE VISIT (OUTPATIENT)
Dept: PSYCHIATRY | Facility: CLINIC | Age: 44
End: 2024-12-04
Payer: MEDICARE

## 2024-12-04 VITALS
SYSTOLIC BLOOD PRESSURE: 147 MMHG | HEIGHT: 62 IN | BODY MASS INDEX: 34.96 KG/M2 | OXYGEN SATURATION: 99 % | DIASTOLIC BLOOD PRESSURE: 85 MMHG | HEART RATE: 109 BPM | WEIGHT: 190 LBS

## 2024-12-04 DIAGNOSIS — F41.0 PANIC ATTACKS: ICD-10-CM

## 2024-12-04 DIAGNOSIS — F41.1 GENERALIZED ANXIETY DISORDER: ICD-10-CM

## 2024-12-04 DIAGNOSIS — F33.1 MAJOR DEPRESSIVE DISORDER, RECURRENT EPISODE, MODERATE: ICD-10-CM

## 2024-12-04 DIAGNOSIS — Z79.899 OTHER LONG TERM (CURRENT) DRUG THERAPY: ICD-10-CM

## 2024-12-04 DIAGNOSIS — F51.05 INSOMNIA DUE TO MENTAL CONDITION: ICD-10-CM

## 2024-12-04 DIAGNOSIS — F90.0 ADHD (ATTENTION DEFICIT HYPERACTIVITY DISORDER), INATTENTIVE TYPE: Primary | ICD-10-CM

## 2024-12-04 RX ORDER — DEXTROAMPHETAMINE SACCHARATE, AMPHETAMINE ASPARTATE, DEXTROAMPHETAMINE SULFATE AND AMPHETAMINE SULFATE 2.5; 2.5; 2.5; 2.5 MG/1; MG/1; MG/1; MG/1
10 TABLET ORAL
Qty: 30 TABLET | Refills: 0 | Status: SHIPPED | OUTPATIENT
Start: 2024-12-04

## 2024-12-04 RX ORDER — DEXTROAMPHETAMINE SACCHARATE, AMPHETAMINE ASPARTATE MONOHYDRATE, DEXTROAMPHETAMINE SULFATE AND AMPHETAMINE SULFATE 6.25; 6.25; 6.25; 6.25 MG/1; MG/1; MG/1; MG/1
25 CAPSULE, EXTENDED RELEASE ORAL DAILY
Qty: 30 CAPSULE | Refills: 0 | Status: SHIPPED | OUTPATIENT
Start: 2025-01-01

## 2024-12-04 NOTE — PROGRESS NOTES
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 evaluation. The patient should be scheduled to return for a diagnostic right mammogram. The patient should also be scheduled for a targeted right breast ultrasound, should this be needed.  No radiographic changes of malignancy, left breast.  TISSUE DENSITY: There are scattered areas of fibroglandular density.  BI-RADS ASSESSMENT: BI-RADS 0. Incomplete - Need Additional Imaging Evaluation and/or Prior Mammograms for Comparison.   Note: It has been reported that there is approximately a 15% false negative in mammography. Therefore, management of a palpable abnormality should not be deferred because of a negative mammogram.           Electronically Signed By-GABINO HEBERT MD On:6/25/2024 3:24 PM          Assessment & Plan   Diagnoses and all orders for this visit:    1. ADHD (attention deficit hyperactivity disorder), inattentive type (Primary)  -     amphetamine-dextroamphetamine (Adderall) 10 MG tablet; Take 1 tablet by mouth Daily With Lunch.  Dispense: 30 tablet; Refill: 0  -     amphetamine-dextroamphetamine XR (ADDERALL XR) 25 MG 24 hr capsule; Take 1 capsule by mouth Daily  Dispense: 30 capsule; Refill: 0    2. Generalized anxiety disorder    3. Major depressive disorder, recurrent episode, moderate    4. Panic attacks    5. 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 Other long term (current) drug therapy        Visit Diagnoses:    ICD-10-CM ICD-9-CM   1. ADHD (attention deficit hyperactivity disorder), inattentive type  F90.0 314.00   2. Generalized anxiety disorder  F41.1 300.02   3. Major depressive disorder, recurrent episode, moderate  F33.1 296.32   4. Panic attacks  F41.0 300.01   5. Insomnia due to mental condition  F51.05 300.9     327.02   6. Other long term (current) drug therapy  Z79.899 V58.69     12/04/2024: Improving ADHD, MDD, JEREMY, insomnia. Add booster dose at noon for 2:30 crash. Grieved her father today. Got to see her Mam[place] for thksgiving (in hospice, has dementia).    Acknowledged and normalized patient's thoughts, feelings, and concerns. Allowed patient to freely discuss and process issues, such as:  Anxiety and depression regarding family's well-being.  Anxiety regarding compulsive buying.  ... using Rogerian psychotherapeutic techniques including unconditional positive regard, reflective listening, and demonstrating clear empathy, with the goal of ameliorating symptoms and maintaining, restoring, or improving self-esteem, adaptive skills, and ego or psychological functions (Anu et al. 1991), the long-term goal of which is to develop a better, healthier perspective and help the patient bear their circumstances more easily.  Time (minutes) spent providing supportive psychotherapy: 16  (This time is exclusive to the therapy session and separate from the time spent on activities used to meet the criteria for the E/M service (history, exam, medical decision-making).)  Start: 11:25  Stop: 11:41  Functional status: mild impairment  Treatment plan: Medication management and supportive psychotherapy  Prognosis: good  Progress: improving  6w    10/31/2024: Improving, increase XR for ADHD. Explored her compulsive buying, which has improved on adderall. \"I feel better.\"  has noticed the improvement.    10/01/2024: R/O PTSD. For " ADHD, UDS, CSA, start adderall once get UDS back. Consider therapy, and soon. Pt to STOP phentermine. 4w    PLAN:  Safety: No acute safety concerns  Therapy: None, but may later for possible PTSD  Risk Assessment: Risk of self-harm acutely is moderate.  Risk factors include anxiety disorder, mood disorder, access to firearms, and recent psychosocial stressors (pandemic). Protective factors include no family history, no present SI, no history of suicide attempts or self-harm in the past, minimal AODA, healthcare seeking, future orientation, willingness to engage in care.  Risk of self-harm chronically is also moderate, but could be further elevated in the event of treatment noncompliance and/or AODA.  Meds:  CONTINUE adderall XR 25 mg qam. START adderall IR 10 mg qnoon. Risks, benefits, side effects discussed with patient including elevated heart rate, elevated blood pressure, irritability, insomnia, sexual dysfunction, appetite suppressing properties, psychosis.  After discussion of these risks and benefits, the patient voiced understanding and agreed to proceed. Jared reviewed, UDS ordered, and controlled substance agreement signed & witnessed.  Labs: UDS approp 10/24    Patient screened positive for depression based on a PHQ-9 score of  on . Follow-up recommendations include: Prescribed antidepressant medication treatment and Suicide Risk Assessment performed.           TREATMENT PLAN/GOALS: Continue supportive psychotherapy efforts and medications as indicated. Treatment and medication options discussed during today's visit. Patient acknowledged and verbally consented to continue with current treatment plan and was educated on the importance of compliance with treatment and follow-up appointments.    MEDICATION ISSUES:  JARED reviewed as expected.  Discussed medication options and treatment plan of prescribed medication as well as the risks, benefits, and side effects including potential falls, possible  impaired driving and metabolic adversities among others. Patient is agreeable to call the office with any worsening of symptoms or onset of side effects. Patient is agreeable to call 911 or go to the nearest ER should he/she begin having SI/HI. No medication side effects or related complaints today.     MEDS ORDERED DURING VISIT:  New Medications Ordered This Visit   Medications    amphetamine-dextroamphetamine (Adderall) 10 MG tablet     Sig: Take 1 tablet by mouth Daily With Lunch.     Dispense:  30 tablet     Refill:  0    amphetamine-dextroamphetamine XR (ADDERALL XR) 25 MG 24 hr capsule     Sig: Take 1 capsule by mouth Daily     Dispense:  30 capsule     Refill:  0       Return in about 6 weeks (around 1/15/2025).         This document has been electronically signed by Martine Guzman MD  December 4, 2024 11:45 EST    Dictated Utilizing Dragon Dictation: Part of this note may be an electronic transcription/translation of spoken language to printed text using the Dragon Dictation System.

## 2024-12-07 DIAGNOSIS — R09.81 SINUS CONGESTION: ICD-10-CM

## 2024-12-07 DIAGNOSIS — J30.2 SEASONAL ALLERGIES: ICD-10-CM

## 2024-12-09 ENCOUNTER — OFFICE VISIT (OUTPATIENT)
Dept: FAMILY MEDICINE CLINIC | Facility: CLINIC | Age: 44
End: 2024-12-09
Payer: MEDICARE

## 2024-12-09 VITALS
BODY MASS INDEX: 34.74 KG/M2 | TEMPERATURE: 97 F | OXYGEN SATURATION: 93 % | HEIGHT: 62 IN | DIASTOLIC BLOOD PRESSURE: 78 MMHG | HEART RATE: 118 BPM | WEIGHT: 188.8 LBS | RESPIRATION RATE: 16 BRPM | SYSTOLIC BLOOD PRESSURE: 126 MMHG

## 2024-12-09 DIAGNOSIS — J30.2 SEASONAL ALLERGIES: Primary | ICD-10-CM

## 2024-12-09 DIAGNOSIS — J45.20 MILD INTERMITTENT ASTHMA WITHOUT COMPLICATION: ICD-10-CM

## 2024-12-09 PROCEDURE — 1160F RVW MEDS BY RX/DR IN RCRD: CPT | Performed by: NURSE PRACTITIONER

## 2024-12-09 PROCEDURE — 1159F MED LIST DOCD IN RCRD: CPT | Performed by: NURSE PRACTITIONER

## 2024-12-09 PROCEDURE — 1126F AMNT PAIN NOTED NONE PRSNT: CPT | Performed by: NURSE PRACTITIONER

## 2024-12-09 PROCEDURE — 99213 OFFICE O/P EST LOW 20 MIN: CPT | Performed by: NURSE PRACTITIONER

## 2024-12-09 RX ORDER — MONTELUKAST SODIUM 10 MG/1
10 TABLET ORAL NIGHTLY
Qty: 90 TABLET | Refills: 1 | Status: SHIPPED | OUTPATIENT
Start: 2024-12-09

## 2024-12-09 RX ORDER — MONTELUKAST SODIUM 10 MG/1
10 TABLET ORAL
Qty: 90 TABLET | Refills: 0 | OUTPATIENT
Start: 2024-12-09

## 2024-12-09 RX ORDER — MONTELUKAST SODIUM 10 MG/1
10 TABLET ORAL NIGHTLY
COMMUNITY
End: 2024-12-09 | Stop reason: SDUPTHER

## 2024-12-09 RX ORDER — LEVOCETIRIZINE DIHYDROCHLORIDE 5 MG/1
5 TABLET, FILM COATED ORAL EVERY EVENING
Qty: 90 TABLET | Refills: 1 | Status: SHIPPED | OUTPATIENT
Start: 2024-12-09

## 2024-12-09 RX ORDER — ALBUTEROL SULFATE 90 UG/1
2 INHALANT RESPIRATORY (INHALATION) EVERY 4 HOURS PRN
Qty: 18 G | Refills: 1 | Status: SHIPPED | OUTPATIENT
Start: 2024-12-09

## 2024-12-09 NOTE — PROGRESS NOTES
Answers submitted by the patient for this visit:  Primary Reason for Visit (Submitted on 2024)  What is the primary reason for your visit?: Cough  Chief Complaint  Cough (Has been sick - just coughing at night/sneezing as well)    Subjective        Janki Krueger presents to [facility]  History of Present Illness  Cough that has continued since getting sick with Thanksgiving has tried bromfed. Promethazine DM and tessalon perles.  And robitussin.  Cough  This is a new problem. The current episode started in the past 7 days. The problem has been worsening. The problem occurs intermittent. The cough is Dry. Pertinent negatives include no chest pain, chills, ear congestion, ear pain, fever, headaches, heartburn, hemoptysis, myalgias, nasal congestion, postnasal drip, rash, rhinorrhea, sore throat, shortness of breath, sweats, weight loss or wheezing. The symptoms are aggravated by lying down. Risk factors for lung disease include smoking/tobacco exposure.       The following portions of the patient's history were personally reviewed and updated as appropriate: allergies, current medications, past medical history, past surgical history, past family history, and past social history.     Body mass index is 34.52 kg/m².           Past History:    Medical History: has a past medical history of ADHD (attention deficit hyperactivity disorder) (), Anemia, Anxiety, Chronic pain disorder (), Contact lens/glasses fitting, Depression, GERD (gastroesophageal reflux disease), Heartburn, Hyperlipidemia, Nausea, Neuropathy, Obesity, Peripheral neuropathy (), PONV (postoperative nausea and vomiting), PTSD (post-traumatic stress disorder), and PVD (peripheral vascular disease).     Surgical History: has a past surgical history that includes Aorta Femoral Bypass (2014);  section; Endometrial ablation (16); Esophagogastroduodenoscopy (N/A, 04/10/2023); Abdominal surgery  (2014); and Vascular surgery (2014).     Family History: family history includes COPD in her father; Cancer in her father and paternal grandmother; Dementia in her maternal grandmother; Diabetes in her father; Hypertension in her father; No Known Problems in her brother, cousin, maternal aunt, maternal grandfather, maternal uncle, mother, paternal aunt, paternal uncle, sister, and another family member; Seizures in her paternal grandfather; Vision loss in her maternal grandmother.     Social History: reports that she has been smoking cigarettes. She started smoking about 28 years ago. She has a 26.6 pack-year smoking history. She has been exposed to tobacco smoke. She has never used smokeless tobacco. She reports that she does not drink alcohol and does not use drugs.    Allergies: Morphine          Current Outpatient Medications:     amphetamine-dextroamphetamine (Adderall) 10 MG tablet, Take 1 tablet by mouth Daily With Lunch., Disp: 30 tablet, Rfl: 0    [START ON 1/1/2025] amphetamine-dextroamphetamine XR (ADDERALL XR) 25 MG 24 hr capsule, Take 1 capsule by mouth Daily, Disp: 30 capsule, Rfl: 0    aspirin 81 MG chewable tablet, Chew 1 tablet Daily., Disp: , Rfl:     CBD (cannabidiol) oral oil, Every 12 (Twelve) Hours., Disp: , Rfl:     clopidogrel (PLAVIX) 75 MG tablet, Take 1 tablet by mouth Daily for 360 days., Disp: 90 tablet, Rfl: 3    HYDROcodone-acetaminophen (NORCO) 7.5-325 MG per tablet, Take 1 tablet by mouth Every 8 (Eight) Hours As Needed for Moderate Pain ., Disp: 90 tablet, Rfl: 0    montelukast (SINGULAIR) 10 MG tablet, Take 1 tablet by mouth Every Night., Disp: 90 tablet, Rfl: 1    omeprazole (priLOSEC) 40 MG capsule, TAKE 1 CAPSULE BY MOUTH EVERY DAY, Disp: 90 capsule, Rfl: 1    pravastatin (PRAVACHOL) 80 MG tablet, TAKE ONE TABLET BY MOUTH EVERY DAY, Disp: 90 tablet, Rfl: 1    albuterol sulfate  (90 Base) MCG/ACT inhaler, Inhale 2 puffs Every 4 (Four) Hours As Needed for Shortness of Air.,  "Disp: 18 g, Rfl: 1    levocetirizine (XYZAL) 5 MG tablet, Take 1 tablet by mouth Every Evening., Disp: 90 tablet, Rfl: 1    Medications Discontinued During This Encounter   Medication Reason    montelukast (SINGULAIR) 10 MG tablet Reorder         Review of Systems   Constitutional:  Negative for chills, fever and unexpected weight loss.   HENT:  Negative for ear pain, postnasal drip, rhinorrhea and sore throat.    Respiratory:  Positive for cough. Negative for hemoptysis, shortness of breath and wheezing.    Cardiovascular:  Negative for chest pain.   Musculoskeletal:  Negative for myalgias.   Skin:  Negative for rash.        Objective         Vitals:    12/09/24 0852   BP: 126/78   BP Location: Right arm   Patient Position: Sitting   Cuff Size: Large Adult   Pulse: 118   Resp: 16   Temp: 97 °F (36.1 °C)   TempSrc: Temporal   SpO2: 93%   Weight: 85.6 kg (188 lb 12.8 oz)   Height: 157.5 cm (62.01\")     Body mass index is 34.52 kg/m².         Physical Exam  Vitals reviewed.   Constitutional:       Appearance: Normal appearance. She is well-developed.   HENT:      Head: Normocephalic and atraumatic.      Mouth/Throat:      Pharynx: No oropharyngeal exudate.   Eyes:      Conjunctiva/sclera: Conjunctivae normal.      Pupils: Pupils are equal, round, and reactive to light.   Cardiovascular:      Rate and Rhythm: Normal rate and regular rhythm.      Heart sounds: Normal heart sounds. No murmur heard.     No friction rub. No gallop.   Pulmonary:      Effort: Pulmonary effort is normal.      Breath sounds: Wheezing present. No rhonchi.      Comments: Expiratory wheezes.  Skin:     General: Skin is warm and dry.   Neurological:      Mental Status: She is alert and oriented to person, place, and time.   Psychiatric:         Mood and Affect: Mood and affect normal.         Behavior: Behavior normal.         Thought Content: Thought content normal.         Judgment: Judgment normal.             Result Review :             "   Assessment and Plan     Diagnoses and all orders for this visit:    1. Seasonal allergies (Primary)  -     montelukast (SINGULAIR) 10 MG tablet; Take 1 tablet by mouth Every Night.  Dispense: 90 tablet; Refill: 1  -     levocetirizine (XYZAL) 5 MG tablet; Take 1 tablet by mouth Every Evening.  Dispense: 90 tablet; Refill: 1    2. Mild intermittent asthma without complication  -     albuterol sulfate  (90 Base) MCG/ACT inhaler; Inhale 2 puffs Every 4 (Four) Hours As Needed for Shortness of Air.  Dispense: 18 g; Refill: 1              Follow Up     Return for Next scheduled follow up.    Patient was given instructions and counseling regarding her condition or for health maintenance advice. Please see specific information pulled into the AVS if appropriate.

## 2024-12-27 DIAGNOSIS — I70.213 ATHEROSCLEROSIS OF NATIVE ARTERIES OF EXTREMITIES WITH INTERMITTENT CLAUDICATION, BILATERAL LEGS: ICD-10-CM

## 2024-12-27 RX ORDER — CLOPIDOGREL BISULFATE 75 MG/1
75 TABLET ORAL DAILY
Qty: 90 TABLET | Refills: 3 | Status: SHIPPED | OUTPATIENT
Start: 2024-12-27 | End: 2025-12-22

## 2024-12-30 DIAGNOSIS — F90.0 ADHD (ATTENTION DEFICIT HYPERACTIVITY DISORDER), INATTENTIVE TYPE: ICD-10-CM

## 2024-12-30 RX ORDER — DEXTROAMPHETAMINE SACCHARATE, AMPHETAMINE ASPARTATE MONOHYDRATE, DEXTROAMPHETAMINE SULFATE AND AMPHETAMINE SULFATE 6.25; 6.25; 6.25; 6.25 MG/1; MG/1; MG/1; MG/1
25 CAPSULE, EXTENDED RELEASE ORAL DAILY
Qty: 30 CAPSULE | Refills: 0 | Status: SHIPPED | OUTPATIENT
Start: 2024-12-31

## 2024-12-30 RX ORDER — DEXTROAMPHETAMINE SACCHARATE, AMPHETAMINE ASPARTATE, DEXTROAMPHETAMINE SULFATE AND AMPHETAMINE SULFATE 2.5; 2.5; 2.5; 2.5 MG/1; MG/1; MG/1; MG/1
10 TABLET ORAL
Qty: 30 TABLET | Refills: 0 | Status: SHIPPED | OUTPATIENT
Start: 2025-01-03

## 2024-12-30 NOTE — TELEPHONE ENCOUNTER
PT(PATIENT) VERIFIED     amphetamine-dextroamphetamine XR (ADDERALL XR) 25 MG 24 hr capsule (2025)     [email or web address] WILL BE CLOSED      PT(PATIENT) IS REQUESTING TO REFILL 1 DAY EARLY     PLEASE ADVISE

## 2025-01-16 ENCOUNTER — OFFICE VISIT (OUTPATIENT)
Dept: PSYCHIATRY | Facility: CLINIC | Age: 45
End: 2025-01-16
Payer: MEDICARE

## 2025-01-16 VITALS
DIASTOLIC BLOOD PRESSURE: 80 MMHG | BODY MASS INDEX: 35.26 KG/M2 | HEIGHT: 62 IN | WEIGHT: 191.6 LBS | SYSTOLIC BLOOD PRESSURE: 132 MMHG | HEART RATE: 109 BPM

## 2025-01-16 DIAGNOSIS — F33.1 MAJOR DEPRESSIVE DISORDER, RECURRENT EPISODE, MODERATE: ICD-10-CM

## 2025-01-16 DIAGNOSIS — F41.0 PANIC ATTACKS: ICD-10-CM

## 2025-01-16 DIAGNOSIS — F41.1 GENERALIZED ANXIETY DISORDER: ICD-10-CM

## 2025-01-16 DIAGNOSIS — F90.0 ADHD (ATTENTION DEFICIT HYPERACTIVITY DISORDER), INATTENTIVE TYPE: Primary | ICD-10-CM

## 2025-01-16 DIAGNOSIS — Z79.899 OTHER LONG TERM (CURRENT) DRUG THERAPY: ICD-10-CM

## 2025-01-16 DIAGNOSIS — Z63.4 BEREAVEMENT: ICD-10-CM

## 2025-01-16 DIAGNOSIS — F51.05 INSOMNIA DUE TO MENTAL CONDITION: ICD-10-CM

## 2025-01-16 DIAGNOSIS — E78.00 HIGH CHOLESTEROL: ICD-10-CM

## 2025-01-16 RX ORDER — DEXTROAMPHETAMINE SACCHARATE, AMPHETAMINE ASPARTATE MONOHYDRATE, DEXTROAMPHETAMINE SULFATE AND AMPHETAMINE SULFATE 6.25; 6.25; 6.25; 6.25 MG/1; MG/1; MG/1; MG/1
25 CAPSULE, EXTENDED RELEASE ORAL DAILY
Qty: 30 CAPSULE | Refills: 0 | Status: SHIPPED | OUTPATIENT
Start: 2025-01-30

## 2025-01-16 RX ORDER — DEXTROAMPHETAMINE SACCHARATE, AMPHETAMINE ASPARTATE, DEXTROAMPHETAMINE SULFATE AND AMPHETAMINE SULFATE 2.5; 2.5; 2.5; 2.5 MG/1; MG/1; MG/1; MG/1
10 TABLET ORAL
Qty: 30 TABLET | Refills: 0 | Status: SHIPPED | OUTPATIENT
Start: 2025-02-03

## 2025-01-16 RX ORDER — ESCITALOPRAM OXALATE 10 MG/1
10 TABLET ORAL DAILY
Qty: 30 TABLET | Refills: 2 | Status: SHIPPED | OUTPATIENT
Start: 2025-01-16

## 2025-01-16 RX ORDER — PRAVASTATIN SODIUM 80 MG/1
80 TABLET ORAL DAILY
Qty: 90 TABLET | Refills: 1 | Status: SHIPPED | OUTPATIENT
Start: 2025-01-16

## 2025-01-16 SDOH — SOCIAL STABILITY - SOCIAL INSECURITY: DISSAPEARANCE AND DEATH OF FAMILY MEMBER: Z63.4

## 2025-01-16 NOTE — TELEPHONE ENCOUNTER
Can I please have a refill on my pravastatin 80mg. Please send to [facility]. Thank You
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
## 2025-01-16 NOTE — TREATMENT PLAN
Anxiety:  3/10 progressing    Goals:  Patient will develop and implement behavioral and cognitive strategies to reduce anxiety and irrational fears, monthly, using Rogerian psychotherapy and CBT where appropriate.  Help patient explore past emotional issues in relation to present anxiety, monthly, until remission of symptoms, using Rogerian psychotherapy and CBT where appropriate.  Help patient develop an awareness of their cognitive and physical responses to anxiety, monthly, until remission of symptoms, using Rogerian psychotherapy and CBT where appropriate.          Depression:  3/10 progressing    Goals:  Patient will demonstrate the ability to initiate new constructive life skills outside of sessions on a consistent basis, monthly, using Rogerian psychotherapy and CBT where appropriate.  Assist patient in setting attainable activities of daily living goals, monthly, using Rogerian psychotherapy and CBT where appropriate.  Provide education about depression, monthly, using Rogerian psychotherapy and CBT where appropriate.  Assist patient in developing healthy coping strategies, monthly, using Rogerian psychotherapy and CBT where appropriate.    Rogerian psychotherapy and CBT will be used to help accomplish the above goals and manage depression and anxiety related to  relationships, family conflict, grief, spending sprees and the trouble they cause, recent trauma witnessed      I have discussed and reviewed this treatment plan with the patient.      Reviewed by Martine Guzman MD   01/16/2025

## 2025-02-18 ENCOUNTER — LAB (OUTPATIENT)
Dept: LAB | Facility: HOSPITAL | Age: 45
End: 2025-02-18
Payer: MEDICARE

## 2025-02-18 DIAGNOSIS — K21.9 GASTROESOPHAGEAL REFLUX DISEASE WITHOUT ESOPHAGITIS: ICD-10-CM

## 2025-02-18 DIAGNOSIS — I73.9 PERIPHERAL VASCULAR DISEASE: ICD-10-CM

## 2025-02-18 DIAGNOSIS — E78.00 HIGH CHOLESTEROL: ICD-10-CM

## 2025-02-18 LAB
ALBUMIN SERPL-MCNC: 3.9 G/DL (ref 3.5–5.2)
ALBUMIN/GLOB SERPL: 1.1 G/DL
ALP SERPL-CCNC: 76 U/L (ref 39–117)
ALT SERPL W P-5'-P-CCNC: 36 U/L (ref 1–33)
ANION GAP SERPL CALCULATED.3IONS-SCNC: 9.4 MMOL/L (ref 5–15)
AST SERPL-CCNC: 26 U/L (ref 1–32)
BACTERIA UR QL AUTO: ABNORMAL /HPF
BILIRUB SERPL-MCNC: 0.3 MG/DL (ref 0–1.2)
BILIRUB UR QL STRIP: NEGATIVE
BUN SERPL-MCNC: 5 MG/DL (ref 6–20)
BUN/CREAT SERPL: 6.7 (ref 7–25)
CALCIUM SPEC-SCNC: 9.1 MG/DL (ref 8.6–10.5)
CHLORIDE SERPL-SCNC: 104 MMOL/L (ref 98–107)
CHOLEST SERPL-MCNC: 179 MG/DL (ref 0–200)
CLARITY UR: CLEAR
CO2 SERPL-SCNC: 26.6 MMOL/L (ref 22–29)
COLOR UR: YELLOW
CREAT SERPL-MCNC: 0.75 MG/DL (ref 0.57–1)
DEPRECATED RDW RBC AUTO: 38.7 FL (ref 37–54)
EGFRCR SERPLBLD CKD-EPI 2021: 99.6 ML/MIN/1.73
ERYTHROCYTE [DISTWIDTH] IN BLOOD BY AUTOMATED COUNT: 11.8 % (ref 12.3–15.4)
GLOBULIN UR ELPH-MCNC: 3.6 GM/DL
GLUCOSE SERPL-MCNC: 91 MG/DL (ref 65–99)
GLUCOSE UR STRIP-MCNC: NEGATIVE MG/DL
HCT VFR BLD AUTO: 43.9 % (ref 34–46.6)
HDLC SERPL QL: 3.14
HDLC SERPL-MCNC: 57 MG/DL (ref 40–60)
HGB BLD-MCNC: 15 G/DL (ref 12–15.9)
HGB UR QL STRIP.AUTO: ABNORMAL
HOLD SPECIMEN: NORMAL
HYALINE CASTS UR QL AUTO: ABNORMAL /LPF
KETONES UR QL STRIP: NEGATIVE
LDLC SERPL CALC-MCNC: 110 MG/DL (ref 0–100)
LEUKOCYTE ESTERASE UR QL STRIP.AUTO: NEGATIVE
MCH RBC QN AUTO: 30.6 PG (ref 26.6–33)
MCHC RBC AUTO-ENTMCNC: 34.2 G/DL (ref 31.5–35.7)
MCV RBC AUTO: 89.6 FL (ref 79–97)
NITRITE UR QL STRIP: NEGATIVE
PH UR STRIP.AUTO: 6.5 [PH] (ref 5–8)
PLATELET # BLD AUTO: 230 10*3/MM3 (ref 140–450)
PMV BLD AUTO: 10.1 FL (ref 6–12)
POTASSIUM SERPL-SCNC: 3.6 MMOL/L (ref 3.5–5.2)
PROT SERPL-MCNC: 7.5 G/DL (ref 6–8.5)
PROT UR QL STRIP: NEGATIVE
RBC # BLD AUTO: 4.9 10*6/MM3 (ref 3.77–5.28)
RBC # UR STRIP: ABNORMAL /HPF
REF LAB TEST METHOD: ABNORMAL
SODIUM SERPL-SCNC: 140 MMOL/L (ref 136–145)
SP GR UR STRIP: 1.01 (ref 1–1.03)
SQUAMOUS #/AREA URNS HPF: ABNORMAL /HPF
TRIGL SERPL-MCNC: 64 MG/DL (ref 0–150)
UROBILINOGEN UR QL STRIP: ABNORMAL
VLDLC SERPL-MCNC: 12 MG/DL (ref 5–40)
WBC # UR STRIP: ABNORMAL /HPF
WBC NRBC COR # BLD AUTO: 6.1 10*3/MM3 (ref 3.4–10.8)

## 2025-02-18 PROCEDURE — 80061 LIPID PANEL: CPT

## 2025-02-18 PROCEDURE — 36415 COLL VENOUS BLD VENIPUNCTURE: CPT

## 2025-02-18 PROCEDURE — 81001 URINALYSIS AUTO W/SCOPE: CPT

## 2025-02-18 PROCEDURE — 80053 COMPREHEN METABOLIC PANEL: CPT

## 2025-02-18 PROCEDURE — 85027 COMPLETE CBC AUTOMATED: CPT

## 2025-02-25 ENCOUNTER — OFFICE VISIT (OUTPATIENT)
Dept: PSYCHIATRY | Facility: CLINIC | Age: 45
End: 2025-02-25
Payer: MEDICARE

## 2025-02-25 VITALS
WEIGHT: 194.6 LBS | SYSTOLIC BLOOD PRESSURE: 136 MMHG | BODY MASS INDEX: 35.81 KG/M2 | HEART RATE: 99 BPM | HEIGHT: 62 IN | DIASTOLIC BLOOD PRESSURE: 79 MMHG

## 2025-02-25 DIAGNOSIS — F41.0 PANIC ATTACKS: ICD-10-CM

## 2025-02-25 DIAGNOSIS — F41.1 GENERALIZED ANXIETY DISORDER: ICD-10-CM

## 2025-02-25 DIAGNOSIS — F90.0 ADHD (ATTENTION DEFICIT HYPERACTIVITY DISORDER), INATTENTIVE TYPE: Primary | ICD-10-CM

## 2025-02-25 DIAGNOSIS — F51.05 INSOMNIA DUE TO MENTAL CONDITION: ICD-10-CM

## 2025-02-25 DIAGNOSIS — Z79.899 OTHER LONG TERM (CURRENT) DRUG THERAPY: ICD-10-CM

## 2025-02-25 DIAGNOSIS — Z63.4 BEREAVEMENT: ICD-10-CM

## 2025-02-25 DIAGNOSIS — F33.1 MAJOR DEPRESSIVE DISORDER, RECURRENT EPISODE, MODERATE: ICD-10-CM

## 2025-02-25 RX ORDER — DEXTROAMPHETAMINE SACCHARATE, AMPHETAMINE ASPARTATE, DEXTROAMPHETAMINE SULFATE AND AMPHETAMINE SULFATE 2.5; 2.5; 2.5; 2.5 MG/1; MG/1; MG/1; MG/1
10 TABLET ORAL
Qty: 30 TABLET | Refills: 0 | Status: SHIPPED | OUTPATIENT
Start: 2025-02-25

## 2025-02-25 RX ORDER — DEXTROAMPHETAMINE SACCHARATE, AMPHETAMINE ASPARTATE MONOHYDRATE, DEXTROAMPHETAMINE SULFATE AND AMPHETAMINE SULFATE 6.25; 6.25; 6.25; 6.25 MG/1; MG/1; MG/1; MG/1
25 CAPSULE, EXTENDED RELEASE ORAL DAILY
Qty: 30 CAPSULE | Refills: 0 | Status: SHIPPED | OUTPATIENT
Start: 2025-02-25

## 2025-02-25 SDOH — SOCIAL STABILITY - SOCIAL INSECURITY: DISSAPEARANCE AND DEATH OF FAMILY MEMBER: Z63.4

## 2025-02-25 NOTE — PROGRESS NOTES
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 On:7/8/2024 1:15 PM      Mammo Screening Digital Tomosynthesis Bilateral With CAD    Result Date: 6/25/2024  Needs further imaging evaluation. The patient should be scheduled to return for a diagnostic right mammogram. The patient should also be scheduled for a targeted right breast ultrasound, should this be needed.  No radiographic changes of malignancy, left breast.  TISSUE DENSITY: There are scattered areas of fibroglandular density.  BI-RADS ASSESSMENT: BI-RADS 0. Incomplete - Need Additional Imaging Evaluation and/or Prior Mammograms for Comparison.   Note: It has been reported that there is approximately a 15% false negative in mammography. Therefore, management of a palpable abnormality should not be deferred because of a negative mammogram.           Electronically Signed By-GABINO HEBERT MD On:6/25/2024 3:24 PM          Assessment & Plan   Diagnoses and all orders for this visit:    1. ADHD (attention deficit hyperactivity disorder), inattentive type (Primary)  -     amphetamine-dextroamphetamine XR (ADDERALL XR) 25 MG 24 hr capsule; Take 1 capsule by mouth Daily  Dispense: 30 capsule; Refill: 0  -     amphetamine-dextroamphetamine  (Adderall) 10 MG tablet; Take 1 tablet by mouth Daily With Lunch.  Dispense: 30 tablet; Refill: 0    2. Generalized anxiety disorder    3. Major depressive disorder, recurrent episode, moderate    4. Panic attacks    5. Insomnia due to mental condition    6. Other long term (current) drug therapy    7. Bereavement        Visit Diagnoses:    ICD-10-CM ICD-9-CM   1. ADHD (attention deficit hyperactivity disorder), inattentive type  F90.0 314.00   2. Generalized anxiety disorder  F41.1 300.02   3. Major depressive disorder, recurrent episode, moderate  F33.1 296.32   4. Panic attacks  F41.0 300.01   5. Insomnia due to mental condition  F51.05 300.9     327.02   6. Other long term (current) drug therapy  Z79.899 V58.69   7. Bereavement  Z63.4 V62.82     02/25/2025: Much improved, not developing PTSD. Pt to trial medication holidays on the weekdays as she feels like the XR is losing effect. Early fill as leaving for [place]. Cancelled [place] (not needed).    Acknowledged and normalized patient's thoughts, feelings, and concerns. Allowed patient to freely discuss and process issues, such as:  Anxiety and depression regarding family's well-being.  Anxiety regarding compulsive buying.  ... using Rogerian psychotherapeutic techniques including unconditional positive regard, reflective listening, and demonstrating clear empathy, with the goal of ameliorating symptoms and maintaining, restoring, or improving self-esteem, adaptive skills, and ego or psychological functions (Anu et al. 1991), the long-term goal of which is to develop a better, healthier perspective and help the patient bear their circumstances more easily.  Time (minutes) spent providing supportive psychotherapy: 16  (This time is exclusive to the therapy session and separate from the time spent on activities used to meet the criteria for the E/M service (history, exam, medical decision-making).)  Start: 11:16  Stop: 11:32  Functional status: mild  "impairment  Treatment plan: Medication management and supportive psychotherapy  Prognosis: good  Progress: fairly stable  6w    01/16/2025: [place] for grief, processing recent witnessed trauma. Would not change adderalls at this time given what she is dealing with right now. Watch for developing PTSD.    12/04/2024: Improving ADHD, MDD, JEREMY, insomnia. Add booster dose at noon for 2:30 crash. Grieved her father today. Got to see her [place] for thksgiving (in hospice, has dementia).    10/31/2024: Improving, increase XR for ADHD. Explored her compulsive buying, which has improved on adderall. \"I feel better.\"  has noticed the improvement.    10/01/2024: R/O PTSD. For ADHD, UDS, CSA, start adderall once get UDS back. Consider therapy, and soon. Pt to STOP phentermine. 4w    PLAN:  Safety: No acute safety concerns  Therapy: None, but may later for possible PTSD  Risk Assessment: Risk of self-harm acutely is moderate.  Risk factors include anxiety disorder, mood disorder, access to firearms, and recent psychosocial stressors (pandemic). Protective factors include no family history, no present SI, no history of suicide attempts or self-harm in the past, minimal AODA, healthcare seeking, future orientation, willingness to engage in care.  Risk of self-harm chronically is also moderate, but could be further elevated in the event of treatment noncompliance and/or AODA.  Meds:  STOPPED lexapro 5 mg qday. HA 2/25   CONTINUE adderall XR 25 mg qam. CONTINUE adderall IR 10 mg qnoon. Risks, benefits, side effects discussed with patient including elevated heart rate, elevated blood pressure, irritability, insomnia, sexual dysfunction, appetite suppressing properties, psychosis.  After discussion of these risks and benefits, the patient voiced understanding and agreed to proceed. Erik reviewed, UDS ordered, and controlled substance agreement signed & witnessed.  Labs: UDS approp 10/24    Patient screened positive for " depression based on a PHQ-9 score of 8 on 2/25/2025. Follow-up recommendations include: Prescribed antidepressant medication treatment and Suicide Risk Assessment performed.           TREATMENT PLAN/GOALS: Continue supportive psychotherapy efforts and medications as indicated. Treatment and medication options discussed during today's visit. Patient acknowledged and verbally consented to continue with current treatment plan and was educated on the importance of compliance with treatment and follow-up appointments.    MEDICATION ISSUES:  JARED reviewed as expected.  Discussed medication options and treatment plan of prescribed medication as well as the risks, benefits, and side effects including potential falls, possible impaired driving and metabolic adversities among others. Patient is agreeable to call the office with any worsening of symptoms or onset of side effects. Patient is agreeable to call 911 or go to the nearest ER should he/she begin having SI/HI. No medication side effects or related complaints today.     MEDS ORDERED DURING VISIT:  New Medications Ordered This Visit   Medications    amphetamine-dextroamphetamine XR (ADDERALL XR) 25 MG 24 hr capsule     Sig: Take 1 capsule by mouth Daily     Dispense:  30 capsule     Refill:  0     Early fill as leaving for [place] for a week. Thank you for the help. Please call with questions: [phone].    amphetamine-dextroamphetamine (Adderall) 10 MG tablet     Sig: Take 1 tablet by mouth Daily With Lunch.     Dispense:  30 tablet     Refill:  0     Early fill as leaving for [place] for a week. Thank you for the help. Please call with questions: [phone].       Return in about 6 weeks (around 4/8/2025).         This document has been electronically signed by Martine Guzman MD  February 25, 2025 11:36 EST    Dictated Utilizing Dragon Dictation: Part of this note may be an electronic transcription/translation of spoken language to printed text using the  [facility] Dictation System.

## 2025-02-27 ENCOUNTER — OFFICE VISIT (OUTPATIENT)
Dept: FAMILY MEDICINE CLINIC | Facility: CLINIC | Age: 45
End: 2025-02-27
Payer: MEDICARE

## 2025-02-27 VITALS
RESPIRATION RATE: 18 BRPM | DIASTOLIC BLOOD PRESSURE: 88 MMHG | SYSTOLIC BLOOD PRESSURE: 120 MMHG | HEART RATE: 110 BPM | HEIGHT: 62 IN | TEMPERATURE: 97.2 F | BODY MASS INDEX: 35.77 KG/M2 | OXYGEN SATURATION: 100 % | WEIGHT: 194.4 LBS

## 2025-02-27 DIAGNOSIS — R35.0 URINARY FREQUENCY: Primary | ICD-10-CM

## 2025-02-27 DIAGNOSIS — L30.9 DERMATITIS OF FOOT: ICD-10-CM

## 2025-02-27 DIAGNOSIS — R31.29 OTHER MICROSCOPIC HEMATURIA: ICD-10-CM

## 2025-02-27 LAB
BILIRUB BLD-MCNC: ABNORMAL MG/DL
CLARITY, POC: ABNORMAL
COLOR UR: YELLOW
EXPIRATION DATE: ABNORMAL
GLUCOSE UR STRIP-MCNC: NEGATIVE MG/DL
KETONES UR QL: NEGATIVE
LEUKOCYTE EST, POC: NEGATIVE
Lab: ABNORMAL
NITRITE UR-MCNC: NEGATIVE MG/ML
PH UR: 6 [PH] (ref 5–8)
PROT UR STRIP-MCNC: ABNORMAL MG/DL
RBC # UR STRIP: ABNORMAL /UL
SP GR UR: 1.03 (ref 1–1.03)
UROBILINOGEN UR QL: ABNORMAL

## 2025-02-27 PROCEDURE — 1126F AMNT PAIN NOTED NONE PRSNT: CPT | Performed by: NURSE PRACTITIONER

## 2025-02-27 PROCEDURE — 81003 URINALYSIS AUTO W/O SCOPE: CPT | Performed by: NURSE PRACTITIONER

## 2025-02-27 PROCEDURE — 1159F MED LIST DOCD IN RCRD: CPT | Performed by: NURSE PRACTITIONER

## 2025-02-27 PROCEDURE — 87086 URINE CULTURE/COLONY COUNT: CPT | Performed by: NURSE PRACTITIONER

## 2025-02-27 PROCEDURE — 1160F RVW MEDS BY RX/DR IN RCRD: CPT | Performed by: NURSE PRACTITIONER

## 2025-02-27 RX ORDER — CLOTRIMAZOLE AND BETAMETHASONE DIPROPIONATE 10; .64 MG/G; MG/G
1 CREAM TOPICAL 2 TIMES DAILY
Qty: 30 G | Refills: 0 | Status: SHIPPED | OUTPATIENT
Start: 2025-02-27

## 2025-02-27 NOTE — PROGRESS NOTES
Answers submitted by the patient for this visit:  Problem not listed (Submitted on 2/20/2025)  Chief Complaint: Other medical problem  Reason for appointment: Checkup and possible uti  abdominal pain: No  anorexia: No  joint pain: No  change in stool: No  chest pain: No  chills: No  nasal congestion: No  cough: No  diaphoresis: No  fatigue: Yes  fever: No  headaches: No  joint swelling: No  myalgias: No  nausea: No  neck pain: No  numbness: No  rash: No  sore throat: No  swollen glands: No  dysuria: Yes  vertigo: No  visual change: No  vomiting: No  weakness: No  Onset: in the past 7 days  Chronicity: new  Frequency: constantly  Chief Complaint  Urinary Frequency and great toe issue (Right on bottom - itches when it gets warm)    Subjective        Janki Krueger presents to [facility]  History of Present Illness  Urinary frequency has had for a few weeks.     Great toe issue:  on the bottom of foot and itches when it gets hot.  Urinary Frequency   Associated symptoms include frequency. Pertinent negatives include no chills, nausea or vomiting.     Checkup and possible uti  Symptoms are: new.   Onset was in the past 7 days.   Symptoms occur: constantly.  Symptoms include: fatigue and dysuria.   Pertinent negative symptoms include no abdominal pain, no anorexia, no joint pain, no change in stool, no chest pain, no chills, no congestion, no cough, no diaphoresis, no fever, no headaches, no joint swelling, no myalgias, no nausea, no neck pain, no numbness, no rash, no sore throat, no swollen glands, no vertigo, no visual change, no vomiting and no weakness.       The following portions of the patient's history were personally reviewed and updated as appropriate: allergies, current medications, past medical history, past surgical history, past family history, and past social history.     Body mass index is 35.55 kg/m².    Class 2 Severe Obesity (BMI >=35 and <=39.9). Obesity-related  health conditions include the following: dyslipidemias. Obesity is unchanged. BMI is is above average; BMI management plan is completed. We discussed portion control and increasing exercise.      Past History:    Medical History: has a past medical history of ADHD (attention deficit hyperactivity disorder) (), Anemia, Anxiety, Chronic pain disorder (), Contact lens/glasses fitting, Depression, GERD (gastroesophageal reflux disease), Heartburn, Hyperlipidemia, Nausea, Neuropathy, Obesity, Peripheral neuropathy (), PONV (postoperative nausea and vomiting), PTSD (post-traumatic stress disorder), and PVD (peripheral vascular disease).     Surgical History: has a past surgical history that includes Aorta Femoral Bypass (2014);  section; Endometrial ablation (16); Esophagogastroduodenoscopy (N/A, 04/10/2023); Abdominal surgery (); and Vascular surgery ().     Family History: family history includes COPD in her brother and father; Cancer in her father and paternal grandmother; Dementia in her maternal grandmother; Diabetes in her father; Hypertension in her father; No Known Problems in her cousin, maternal aunt, maternal grandfather, maternal uncle, mother, paternal aunt, paternal uncle, sister, and another family member; Seizures in her paternal grandfather; Vision loss in her maternal grandmother.     Social History: reports that she has been smoking cigarettes. She started smoking about 28 years ago. She has a 26.7 pack-year smoking history. She has been exposed to tobacco smoke. She has never used smokeless tobacco. She reports that she does not drink alcohol and does not use drugs.    Allergies: Morphine          Current Outpatient Medications:     albuterol sulfate  (90 Base) MCG/ACT inhaler, Inhale 2 puffs Every 4 (Four) Hours As Needed for Shortness of Air., Disp: 18 g, Rfl: 1    amphetamine-dextroamphetamine (Adderall) 10 MG tablet, Take 1 tablet by mouth Daily With  Lunch., Disp: 30 tablet, Rfl: 0    amphetamine-dextroamphetamine XR (ADDERALL XR) 25 MG 24 hr capsule, Take 1 capsule by mouth Daily, Disp: 30 capsule, Rfl: 0    aspirin 81 MG chewable tablet, Chew 1 tablet Daily., Disp: , Rfl:     CBD (cannabidiol) oral oil, Every 12 (Twelve) Hours., Disp: , Rfl:     clopidogrel (PLAVIX) 75 MG tablet, Take 1 tablet by mouth Daily for 360 days., Disp: 90 tablet, Rfl: 3    HYDROcodone-acetaminophen (NORCO) 7.5-325 MG per tablet, Take 1 tablet by mouth Every 8 (Eight) Hours As Needed for Moderate Pain ., Disp: 90 tablet, Rfl: 0    levocetirizine (XYZAL) 5 MG tablet, Take 1 tablet by mouth Every Evening., Disp: 90 tablet, Rfl: 1    montelukast (SINGULAIR) 10 MG tablet, Take 1 tablet by mouth Every Night., Disp: 90 tablet, Rfl: 1    omeprazole (priLOSEC) 40 MG capsule, TAKE 1 CAPSULE BY MOUTH EVERY DAY, Disp: 90 capsule, Rfl: 1    pravastatin (PRAVACHOL) 80 MG tablet, Take 1 tablet by mouth Daily., Disp: 90 tablet, Rfl: 1    clotrimazole-betamethasone (LOTRISONE) 1-0.05 % cream, Apply 1 Application topically to the appropriate area as directed 2 (Two) Times a Day., Disp: 30 g, Rfl: 0    There are no discontinued medications.      Review of Systems   Constitutional:  Positive for fatigue. Negative for chills, diaphoresis and fever.   HENT:  Negative for congestion, sore throat and swollen glands.    Respiratory:  Negative for cough.    Cardiovascular:  Negative for chest pain.   Gastrointestinal:  Negative for abdominal pain, anorexia, nausea and vomiting.   Genitourinary:  Positive for dysuria and frequency.   Musculoskeletal:  Negative for joint pain, myalgias and neck pain.   Skin:  Negative for rash.   Neurological:  Negative for vertigo, weakness and numbness.        Objective         Vitals:    02/27/25 0918   BP: 120/88   BP Location: Right arm   Patient Position: Sitting   Cuff Size: Adult   Pulse: 110   Resp: 18   Temp: 97.2 °F (36.2 °C)   TempSrc: Temporal   SpO2: 100%  "  Weight: 88.2 kg (194 lb 6.4 oz)   Height: 157.5 cm (62.01\")     Body mass index is 35.55 kg/m².         Physical Exam  Vitals reviewed.   Constitutional:       Appearance: Normal appearance. She is well-developed.   HENT:      Head: Normocephalic and atraumatic.      Mouth/Throat:      Pharynx: No oropharyngeal exudate.   Eyes:      Conjunctiva/sclera: Conjunctivae normal.      Pupils: Pupils are equal, round, and reactive to light.   Cardiovascular:      Rate and Rhythm: Normal rate and regular rhythm.      Heart sounds: Normal heart sounds. No murmur heard.     No friction rub. No gallop.   Pulmonary:      Effort: Pulmonary effort is normal.      Breath sounds: Normal breath sounds. No wheezing or rhonchi.   Skin:     General: Skin is warm and dry.   Neurological:      Mental Status: She is alert and oriented to person, place, and time.   Psychiatric:         Mood and Affect: Mood and affect normal.         Behavior: Behavior normal.         Thought Content: Thought content normal.         Judgment: Judgment normal.             Result Review :               Assessment and Plan     Diagnoses and all orders for this visit:    1. Urinary frequency (Primary)  -     Urine Culture - Urine, Urine, Clean Catch; Future  -     POC Urinalysis Dipstick, Automated    2. Dermatitis of foot  -     clotrimazole-betamethasone (LOTRISONE) 1-0.05 % cream; Apply 1 Application topically to the appropriate area as directed 2 (Two) Times a Day.  Dispense: 30 g; Refill: 0              Follow Up     Return for Next scheduled follow up.    Patient was given instructions and counseling regarding her condition or for health maintenance advice. Please see specific information pulled into the AVS if appropriate.         "

## 2025-03-01 LAB — BACTERIA SPEC AEROBE CULT: NO GROWTH

## 2025-03-24 DIAGNOSIS — F90.0 ADHD (ATTENTION DEFICIT HYPERACTIVITY DISORDER), INATTENTIVE TYPE: ICD-10-CM

## 2025-03-24 RX ORDER — DEXTROAMPHETAMINE SACCHARATE, AMPHETAMINE ASPARTATE MONOHYDRATE, DEXTROAMPHETAMINE SULFATE AND AMPHETAMINE SULFATE 6.25; 6.25; 6.25; 6.25 MG/1; MG/1; MG/1; MG/1
25 CAPSULE, EXTENDED RELEASE ORAL DAILY
Qty: 30 CAPSULE | Refills: 0 | Status: SHIPPED | OUTPATIENT
Start: 2025-03-28

## 2025-03-24 RX ORDER — DEXTROAMPHETAMINE SACCHARATE, AMPHETAMINE ASPARTATE, DEXTROAMPHETAMINE SULFATE AND AMPHETAMINE SULFATE 2.5; 2.5; 2.5; 2.5 MG/1; MG/1; MG/1; MG/1
10 TABLET ORAL
Qty: 30 TABLET | Refills: 0 | Status: SHIPPED | OUTPATIENT
Start: 2025-03-28

## 2025-03-25 NOTE — TELEPHONE ENCOUNTER
Clinic Care Coordination Contact  Follow Up Progress Note      Assessment: RN CC attended office visit with Nnamdi and Dr. Felix this date.  Nnamdi comes in for an MOUD follow up appointment.  Has been taking Suboxone as prescribed- 24/6mg daily.  Reports dose is adequate and would like to continue on current dose. Initiated on MOUD in October 2023 and has remained solid in sustained recovery from opioids.  Did have some trouble with dosing initially - tended to increase dose on own.  Is on max dose of Suboxone - which he is aware of - but finds this dose most beneficial.  Has had some struggles with insurance as well - unsure of what actual issue is at this time - but he is approved for 100% [facility], which is due for renewal by June 17, 2025.   Nnamdi states that he hasn't even looked into his insurance issues as of yet.  He got engaged!  Getting  in July 12, 2025!  Looking forward to this and is excited, yet nervous.  Commended him on this!    Care Gaps:    Health Maintenance Due   Topic Date Due    CONTROLLED SUBSTANCE AGREEMENT FOR CHRONIC PAIN MANAGEMENT  Never done    YEARLY PREVENTIVE VISIT  04/07/2017    HEPATITIS A IMMUNIZATION (1 of 2 - Risk 2-dose series) Never done    Pneumococcal Vaccine: Pediatrics (0 to 5 Years) and At-Risk Patients (6 to 49 Years) (2 of 2 - PCV) 11/09/2021    COVID-19 Vaccine (1 - 2024-25 season) Never done       Will continue to work on these    Care Plans  Care Plan: Medication Regimen       Problem: Medication Adherence       Goal: Consistently take Medications as Prescribed       This Visit's Progress: 90% Recent Progress: 90%    Note:     Barriers: increases meds on own, lack of coping  Strengths: honesty  Patient expressed understanding of goal: Yes  Action steps to achieve this goal:  1. I will take my medications as prescribed  2. I will not increase my Suboxone dose, as I am now on the max dose  3. I will reach out to care team if I am struggling              Told patient results                       Intervention/Education provided during outreach: Stage of Change: Maintenance  Reviewed information and resources for treatment and ongoing sobriety    Facilitated understanding the importance of awareness of factors that contribute to relapse , Assisted patient in identifying personal vulnerabilities, thoughts, emotions, and situations that may lead to relapse , Coached on skills to manage factors that contribute to relapse, and Facilitated understanding of effective coping skills in response to triggers for substance use    Would individual benefit from a referral for additional services/resources?    Yes - Health-related social needs - Transportation - provided him with a gas card this date        Continue current meds as prescribed.  Annual lab work updated today.  Educated him on foods high in iron content - broccoli, red meats, beans, spinach, etc.  Try to incorporate more of these foods into your diet.     Outreach Frequency: 3 months, more frequently as needed        Plan:   No change in treatment plan at this time.    Care Coordinator will follow up in 12 weeks, sooner if he has concerns.    Jyothi Fowler RN-BSN, [facility] Care Coordinator  [phone]

## 2025-04-07 ENCOUNTER — PATIENT MESSAGE (OUTPATIENT)
Dept: FAMILY MEDICINE CLINIC | Facility: CLINIC | Age: 45
End: 2025-04-07
Payer: MEDICARE

## 2025-04-07 NOTE — PROGRESS NOTES
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 biopsy with clip placement and follow-up right breast mammogram to ensure mammographic and sonographic correlation. Findings and recommendations discussed with the patient and she is scheduled for ultrasound-guided cyst aspiration biopsy.  Tissue Density:  There are scattered areas of fibroglandular density.  BI-RADS ASSESSMENT: BI-RADS 4. Suspicious abnormality.   The patient's information is entered into a computerized reminder system with a targeted due date for the next mammogram.  Note:  It has been reported that there is approximately a 15% false negative in mammography.  Therefore, management of a palpable abnormality should not be deferred because of a negative mammogram.            Electronically Signed By-SHANON SCHWARZ MD On:7/8/2024 1:15 PM      Mammo Screening Digital Tomosynthesis Bilateral With CAD    Result Date: 6/25/2024  Needs further imaging evaluation. The patient should be scheduled to return for a diagnostic right mammogram. The patient should also be scheduled for a targeted right breast ultrasound, should this be needed.  No radiographic changes of malignancy, left  breast.  TISSUE DENSITY: There are scattered areas of fibroglandular density.  BI-RADS ASSESSMENT: BI-RADS 0. Incomplete - Need Additional Imaging Evaluation and/or Prior Mammograms for Comparison.   Note: It has been reported that there is approximately a 15% false negative in mammography. Therefore, management of a palpable abnormality should not be deferred because of a negative mammogram.           Electronically Signed By-GABINO HEBERT MD On:6/25/2024 3:24 PM          Assessment & Plan   Diagnoses and all orders for this visit:    1. ADHD (attention deficit hyperactivity disorder), inattentive type (Primary)  -     amphetamine-dextroamphetamine XR (ADDERALL XR) 25 MG 24 hr capsule; Take 1 capsule by mouth Daily  Dispense: 30 capsule; Refill: 0  -     amphetamine-dextroamphetamine (Adderall) 10 MG tablet; Take 1 tablet by mouth Daily With Lunch.  Dispense: 30 tablet; Refill: 0    2. Generalized anxiety disorder    3. Major depressive disorder, recurrent episode, moderate    4. Panic attacks    5. Insomnia due to mental condition    6. Other long term (current) drug therapy    7. Bereavement        Visit Diagnoses:    ICD-10-CM ICD-9-CM   1. ADHD (attention deficit hyperactivity disorder), inattentive type  F90.0 314.00   2. Generalized anxiety disorder  F41.1 300.02   3. Major depressive disorder, recurrent episode, moderate  F33.1 296.32   4. Panic attacks  F41.0 300.01   5. Insomnia due to mental condition  F51.05 300.9     327.02   6. Other long term (current) drug therapy  Z79.899 V58.69   7. Bereavement  Z63.4 V62.82     04/08/2025: WG, but this isn't related to Adderall, which is an appetite suppressant. MH stable, no changes.    Acknowledged and normalized patient's thoughts, feelings, and concerns. Allowed patient to freely discuss and process issues, such as:  Anxiety and depression regarding family's well-being.  Anxiety regarding compulsive buying.  ... using Rogerian psychotherapeutic techniques  "including unconditional positive regard, reflective listening, and demonstrating clear empathy, with the goal of ameliorating symptoms and maintaining, restoring, or improving self-esteem, adaptive skills, and ego or psychological functions (Anu et al. 1991), the long-term goal of which is to develop a better, healthier perspective and help the patient bear their circumstances more easily.  Time (minutes) spent providing supportive psychotherapy: 16  (This time is exclusive to the therapy session and separate from the time spent on activities used to meet the criteria for the E/M service (history, exam, medical decision-making).)  Start: 10:45  Stop: 11:01  Functional status: mild impairment  Treatment plan: Medication management and supportive psychotherapy  Prognosis: good  Progress: fairly stable  6w    02/25/2025: Much improved, not developing PTSD. Pt to trial medication holidays on the weekdays as she feels like the XR is losing effect. Early fill as leaving for [place]. Cancelled [place] (not needed).    01/16/2025: [place] for grief, processing recent witnessed trauma. Would not change adderalls at this time given what she is dealing with right now. Watch for developing PTSD.    12/04/2024: Improving ADHD, MDD, JEREMY, insomnia. Add booster dose at noon for 2:30 crash. Grieved her father today. Got to see her [place] for thksgiving (in hospice, has dementia).    10/31/2024: Improving, increase XR for ADHD. Explored her compulsive buying, which has improved on adderall. \"I feel better.\"  has noticed the improvement.    10/01/2024: R/O PTSD. For ADHD, UDS, CSA, start adderall once get UDS back. Consider therapy, and soon. Pt to STOP phentermine. 4w    PLAN:  Safety: No acute safety concerns  Therapy: None, but may later for possible PTSD  Risk Assessment: Risk of self-harm acutely is moderate.  Risk factors include anxiety disorder, mood disorder, access to firearms, and recent psychosocial stressors " (pandemic). Protective factors include no family history, no present SI, no history of suicide attempts or self-harm in the past, minimal AODA, healthcare seeking, future orientation, willingness to engage in care.  Risk of self-harm chronically is also moderate, but could be further elevated in the event of treatment noncompliance and/or AODA.  Meds:  CONTINUE adderall XR 25 mg qam. CONTINUE adderall IR 10 mg qnoon. Risks, benefits, side effects discussed with patient including elevated heart rate, elevated blood pressure, irritability, insomnia, sexual dysfunction, appetite suppressing properties, psychosis.  After discussion of these risks and benefits, the patient voiced understanding and agreed to proceed. Jared reviewed, UDS ordered, and controlled substance agreement signed & witnessed.  S/P:  lexapro 5 mg qday. HA 2/25   Labs: UDS approp 10/24    Patient screened positive for depression based on a PHQ-9 score of 8 on 2/25/2025. Follow-up recommendations include: Prescribed antidepressant medication treatment and Suicide Risk Assessment performed.           TREATMENT PLAN/GOALS: Continue supportive psychotherapy efforts and medications as indicated. Treatment and medication options discussed during today's visit. Patient acknowledged and verbally consented to continue with current treatment plan and was educated on the importance of compliance with treatment and follow-up appointments.    MEDICATION ISSUES:  JARED reviewed as expected.  Discussed medication options and treatment plan of prescribed medication as well as the risks, benefits, and side effects including potential falls, possible impaired driving and metabolic adversities among others. Patient is agreeable to call the office with any worsening of symptoms or onset of side effects. Patient is agreeable to call 911 or go to the nearest ER should he/she begin having SI/HI. No medication side effects or related complaints today.     MEDS ORDERED  DURING VISIT:  New Medications Ordered This Visit   Medications    amphetamine-dextroamphetamine XR (ADDERALL XR) 25 MG 24 hr capsule     Sig: Take 1 capsule by mouth Daily     Dispense:  30 capsule     Refill:  0     Thank you for the help. Please call with questions: [phone].    amphetamine-dextroamphetamine (Adderall) 10 MG tablet     Sig: Take 1 tablet by mouth Daily With Lunch.     Dispense:  30 tablet     Refill:  0     Thank you for the help. Please call with questions: [phone].       Return in about 6 weeks (around 5/20/2025).         This document has been electronically signed by Martine Guzman MD  April 8, 2025 11:01 EDT    Dictated Utilizing Dragon Dictation: Part of this note may be an electronic transcription/translation of spoken language to printed text using the Dragon Dictation System.

## 2025-04-08 ENCOUNTER — OFFICE VISIT (OUTPATIENT)
Dept: PSYCHIATRY | Facility: CLINIC | Age: 45
End: 2025-04-08
Payer: MEDICARE

## 2025-04-08 VITALS
DIASTOLIC BLOOD PRESSURE: 81 MMHG | BODY MASS INDEX: 36.36 KG/M2 | SYSTOLIC BLOOD PRESSURE: 140 MMHG | HEART RATE: 98 BPM | HEIGHT: 62 IN | WEIGHT: 197.6 LBS

## 2025-04-08 DIAGNOSIS — F51.05 INSOMNIA DUE TO MENTAL CONDITION: ICD-10-CM

## 2025-04-08 DIAGNOSIS — Z79.899 OTHER LONG TERM (CURRENT) DRUG THERAPY: ICD-10-CM

## 2025-04-08 DIAGNOSIS — Z63.4 BEREAVEMENT: ICD-10-CM

## 2025-04-08 DIAGNOSIS — F90.0 ADHD (ATTENTION DEFICIT HYPERACTIVITY DISORDER), INATTENTIVE TYPE: Primary | ICD-10-CM

## 2025-04-08 DIAGNOSIS — F41.1 GENERALIZED ANXIETY DISORDER: ICD-10-CM

## 2025-04-08 DIAGNOSIS — F33.1 MAJOR DEPRESSIVE DISORDER, RECURRENT EPISODE, MODERATE: ICD-10-CM

## 2025-04-08 DIAGNOSIS — F41.0 PANIC ATTACKS: ICD-10-CM

## 2025-04-08 RX ORDER — DEXTROAMPHETAMINE SACCHARATE, AMPHETAMINE ASPARTATE MONOHYDRATE, DEXTROAMPHETAMINE SULFATE AND AMPHETAMINE SULFATE 6.25; 6.25; 6.25; 6.25 MG/1; MG/1; MG/1; MG/1
25 CAPSULE, EXTENDED RELEASE ORAL DAILY
Qty: 30 CAPSULE | Refills: 0 | Status: SHIPPED | OUTPATIENT
Start: 2025-04-26

## 2025-04-08 RX ORDER — FLUCONAZOLE 150 MG/1
TABLET ORAL
COMMUNITY
Start: 2025-04-07

## 2025-04-08 RX ORDER — DIAZEPAM 2 MG/1
2 TABLET ORAL 2 TIMES DAILY PRN
COMMUNITY

## 2025-04-08 RX ORDER — DEXTROAMPHETAMINE SACCHARATE, AMPHETAMINE ASPARTATE, DEXTROAMPHETAMINE SULFATE AND AMPHETAMINE SULFATE 2.5; 2.5; 2.5; 2.5 MG/1; MG/1; MG/1; MG/1
10 TABLET ORAL
Qty: 30 TABLET | Refills: 0 | Status: SHIPPED | OUTPATIENT
Start: 2025-04-26

## 2025-04-08 RX ORDER — AMOXICILLIN 500 MG/1
CAPSULE ORAL
COMMUNITY
Start: 2025-04-07

## 2025-04-08 SDOH — SOCIAL STABILITY - SOCIAL INSECURITY: DISSAPEARANCE AND DEATH OF FAMILY MEMBER: Z63.4

## 2025-04-08 NOTE — TREATMENT PLAN
Anxiety:  3/10 progressing    Goals:  Patient will develop and implement behavioral and cognitive strategies to reduce anxiety and irrational fears, monthly, using Rogerian psychotherapy and CBT where appropriate.  Help patient explore past emotional issues in relation to present anxiety, monthly, until remission of symptoms, using Rogerian psychotherapy and CBT where appropriate.  Help patient develop an awareness of their cognitive and physical responses to anxiety, monthly, until remission of symptoms, using Rogerian psychotherapy and CBT where appropriate.          Depression:  2/10 progressing    Goals:  Patient will demonstrate the ability to initiate new constructive life skills outside of sessions on a consistent basis, monthly, using Rogerian psychotherapy and CBT where appropriate.  Assist patient in setting attainable activities of daily living goals, monthly, using Rogerian psychotherapy and CBT where appropriate.  Provide education about depression, monthly, using Rogerian psychotherapy and CBT where appropriate.  Assist patient in developing healthy coping strategies, monthly, using Rogerian psychotherapy and CBT where appropriate.    Rogerian psychotherapy and CBT will be used to help accomplish the above goals and manage depression and anxiety related to relationships, family conflict, grief, spending sprees and the trouble they cause, recent trauma witnessed       I have discussed and reviewed this treatment plan with the patient.      Reviewed by Martine Guzman MD   04/08/2025

## 2025-04-14 DIAGNOSIS — E78.00 HIGH CHOLESTEROL: ICD-10-CM

## 2025-04-14 RX ORDER — PRAVASTATIN SODIUM 80 MG/1
80 TABLET ORAL DAILY
Qty: 90 TABLET | Refills: 1 | Status: SHIPPED | OUTPATIENT
Start: 2025-04-14

## 2025-04-15 DIAGNOSIS — K21.9 GASTROESOPHAGEAL REFLUX DISEASE WITHOUT ESOPHAGITIS: ICD-10-CM

## 2025-04-16 RX ORDER — OMEPRAZOLE 40 MG/1
40 CAPSULE, DELAYED RELEASE ORAL DAILY
Qty: 90 CAPSULE | Refills: 1 | Status: SHIPPED | OUTPATIENT
Start: 2025-04-16

## 2025-04-16 NOTE — TELEPHONE ENCOUNTER
Upcoming Appts  With Family Medicine (REG Ortiz)  08/27/2025 at 10:45 AM  Last Office Visit - This Dept  2/27/2025 Len Hammond APRN

## 2025-04-28 DIAGNOSIS — F90.0 ADHD (ATTENTION DEFICIT HYPERACTIVITY DISORDER), INATTENTIVE TYPE: ICD-10-CM

## 2025-04-28 RX ORDER — DEXTROAMPHETAMINE SACCHARATE, AMPHETAMINE ASPARTATE MONOHYDRATE, DEXTROAMPHETAMINE SULFATE AND AMPHETAMINE SULFATE 6.25; 6.25; 6.25; 6.25 MG/1; MG/1; MG/1; MG/1
25 CAPSULE, EXTENDED RELEASE ORAL DAILY
Qty: 30 CAPSULE | Refills: 0 | Status: SHIPPED | OUTPATIENT
Start: 2025-04-28

## 2025-04-28 NOTE — TELEPHONE ENCOUNTER
CALLED PT STATES SHE IS HAVING TROUBLE GETTING Rx FOR ADDERALL 25MG AT [facility]. PT STATES [facility] HAS THE PILLS TO DISPENSE THE ADDERALL 10 MG PT WANTING RX TO GO BACK TO [facility] PLEASE ADVISE  PT WOULD ALSO LIKE A CALL BACK ONCE Rx IS SENT

## 2025-05-12 ENCOUNTER — PATIENT MESSAGE (OUTPATIENT)
Dept: FAMILY MEDICINE CLINIC | Facility: CLINIC | Age: 45
End: 2025-05-12

## 2025-05-12 ENCOUNTER — OFFICE VISIT (OUTPATIENT)
Dept: FAMILY MEDICINE CLINIC | Facility: CLINIC | Age: 45
End: 2025-05-12
Payer: MEDICARE

## 2025-05-12 VITALS
HEART RATE: 101 BPM | WEIGHT: 198.4 LBS | TEMPERATURE: 97.1 F | DIASTOLIC BLOOD PRESSURE: 82 MMHG | OXYGEN SATURATION: 100 % | HEIGHT: 62 IN | BODY MASS INDEX: 36.51 KG/M2 | SYSTOLIC BLOOD PRESSURE: 108 MMHG | RESPIRATION RATE: 20 BRPM

## 2025-05-12 DIAGNOSIS — N76.4 ABSCESS OF LABIA MAJORA: Primary | ICD-10-CM

## 2025-05-12 PROCEDURE — 99213 OFFICE O/P EST LOW 20 MIN: CPT | Performed by: NURSE PRACTITIONER

## 2025-05-12 PROCEDURE — 1125F AMNT PAIN NOTED PAIN PRSNT: CPT | Performed by: NURSE PRACTITIONER

## 2025-05-12 PROCEDURE — 1159F MED LIST DOCD IN RCRD: CPT | Performed by: NURSE PRACTITIONER

## 2025-05-12 PROCEDURE — 1160F RVW MEDS BY RX/DR IN RCRD: CPT | Performed by: NURSE PRACTITIONER

## 2025-05-12 RX ORDER — MUPIROCIN 20 MG/G
1 OINTMENT TOPICAL 3 TIMES DAILY
Qty: 22 G | Refills: 0 | Status: SHIPPED | OUTPATIENT
Start: 2025-05-12

## 2025-05-12 RX ORDER — FLUCONAZOLE 150 MG/1
150 TABLET ORAL ONCE
Qty: 2 TABLET | Refills: 0 | Status: SHIPPED | OUTPATIENT
Start: 2025-05-12 | End: 2025-05-12

## 2025-05-12 RX ORDER — SULFAMETHOXAZOLE AND TRIMETHOPRIM 800; 160 MG/1; MG/1
1 TABLET ORAL 2 TIMES DAILY
Qty: 20 TABLET | Refills: 0 | Status: SHIPPED | OUTPATIENT
Start: 2025-05-12

## 2025-05-12 NOTE — PROGRESS NOTES
Chief Complaint  Abscess (Vaginally/)    Subjective        Janki Krueger presents to [facility]  History of Present Illness  Has a recurrence of Abscess and cellulitis of right labia majora.  Thinks popped on the juana to the office has noted for 4 day.  Abscess  Pertinent negative symptoms include no chest pain, no cough, no fever, no numbness and no weakness.       The following portions of the patient's history were personally reviewed and updated as appropriate: allergies, current medications, past medical history, past surgical history, past family history, and past social history.     Body mass index is 36.28 kg/m².           Past History:    Medical History: has a past medical history of ADHD (attention deficit hyperactivity disorder) (), Anemia, Anxiety, Chronic pain disorder (), Contact lens/glasses fitting, Depression, GERD (gastroesophageal reflux disease), Heartburn, Hyperlipidemia, Nausea, Neuropathy, Obesity, Peripheral neuropathy (), PONV (postoperative nausea and vomiting), PTSD (post-traumatic stress disorder), and PVD (peripheral vascular disease).     Surgical History: has a past surgical history that includes Aorta Femoral Bypass (2014);  section; Endometrial ablation (16); Esophagogastroduodenoscopy (N/A, 04/10/2023); Abdominal surgery (); and Vascular surgery ().     Family History: family history includes COPD in her brother and father; Cancer in her father and paternal grandmother; Dementia in her maternal grandmother; Diabetes in her father; Hypertension in her father; No Known Problems in her cousin, maternal aunt, maternal grandfather, maternal uncle, mother, paternal aunt, paternal uncle, sister, and another family member; Seizures in her paternal grandfather; Vision loss in her maternal grandmother.     Social History: reports that she has been smoking cigarettes. She started smoking about 29 years ago. She has a 26.7  pack-year smoking history. She has been exposed to tobacco smoke. She has never used smokeless tobacco. She reports that she does not drink alcohol and does not use drugs.    Allergies: Morphine          Current Outpatient Medications:     amphetamine-dextroamphetamine (Adderall) 10 MG tablet, Take 1 tablet by mouth Daily With Lunch., Disp: 30 tablet, Rfl: 0    amphetamine-dextroamphetamine XR (ADDERALL XR) 25 MG 24 hr capsule, Take 1 capsule by mouth Daily, Disp: 30 capsule, Rfl: 0    aspirin 81 MG chewable tablet, Chew 1 tablet Daily., Disp: , Rfl:     CBD (cannabidiol) oral oil, Every 12 (Twelve) Hours., Disp: , Rfl:     clopidogrel (PLAVIX) 75 MG tablet, Take 1 tablet by mouth Daily for 360 days., Disp: 90 tablet, Rfl: 3    HYDROcodone-acetaminophen (NORCO) 7.5-325 MG per tablet, Take 1 tablet by mouth Every 8 (Eight) Hours As Needed for Moderate Pain ., Disp: 90 tablet, Rfl: 0    levocetirizine (XYZAL) 5 MG tablet, Take 1 tablet by mouth Every Evening., Disp: 90 tablet, Rfl: 1    montelukast (SINGULAIR) 10 MG tablet, Take 1 tablet by mouth Every Night., Disp: 90 tablet, Rfl: 1    omeprazole (priLOSEC) 40 MG capsule, Take 1 capsule by mouth Daily., Disp: 90 capsule, Rfl: 1    pravastatin (PRAVACHOL) 80 MG tablet, Take 1 tablet by mouth Daily., Disp: 90 tablet, Rfl: 1    mupirocin (BACTROBAN) 2 % ointment, Apply 1 Application topically to the appropriate area as directed 3 (Three) Times a Day., Disp: 22 g, Rfl: 0    sulfamethoxazole-trimethoprim (Bactrim DS) 800-160 MG per tablet, Take 1 tablet by mouth 2 (Two) Times a Day., Disp: 20 tablet, Rfl: 0    Medications Discontinued During This Encounter   Medication Reason    albuterol sulfate  (90 Base) MCG/ACT inhaler *Therapy completed    clotrimazole-betamethasone (LOTRISONE) 1-0.05 % cream *Therapy completed    fluconazole (DIFLUCAN) 150 MG tablet *Therapy completed    amoxicillin (AMOXIL) 500 MG capsule *Therapy completed    diazePAM (VALIUM) 2 MG tablet  "*Therapy completed         Review of Systems   Constitutional:  Negative for fever.   Respiratory:  Negative for cough and shortness of breath.    Cardiovascular:  Negative for chest pain, palpitations and leg swelling.   Neurological:  Negative for dizziness, weakness, numbness and headache.        Objective         Vitals:    05/12/25 0946   BP: 108/82   BP Location: Right arm   Patient Position: Sitting   Cuff Size: Adult   Pulse: 101   Resp: 20   Temp: 97.1 °F (36.2 °C)   TempSrc: Temporal   SpO2: 100%   Weight: 90 kg (198 lb 6.4 oz)   Height: 157.5 cm (62.01\")     Body mass index is 36.28 kg/m².         Physical Exam  Vitals reviewed.   Constitutional:       Appearance: Normal appearance. She is well-developed.   HENT:      Head: Normocephalic and atraumatic.      Mouth/Throat:      Pharynx: No oropharyngeal exudate.   Eyes:      Conjunctiva/sclera: Conjunctivae normal.      Pupils: Pupils are equal, round, and reactive to light.   Cardiovascular:      Rate and Rhythm: Normal rate and regular rhythm.      Heart sounds: Normal heart sounds. No murmur heard.     No friction rub. No gallop.   Pulmonary:      Effort: Pulmonary effort is normal.      Breath sounds: Normal breath sounds. No wheezing or rhonchi.   Genitourinary:         Comments: Partially open abscess that is starting to drain.  Expressed abscess and cultured.  All fluid expressed.  Skin:     General: Skin is warm and dry.   Neurological:      Mental Status: She is alert and oriented to person, place, and time.   Psychiatric:         Mood and Affect: Mood and affect normal.         Behavior: Behavior normal.         Thought Content: Thought content normal.         Judgment: Judgment normal.             Result Review :               Assessment and Plan     Diagnoses and all orders for this visit:    1. Abscess of labia majora (Primary)  -     mupirocin (BACTROBAN) 2 % ointment; Apply 1 Application topically to the appropriate area as directed 3 " (Three) Times a Day.  Dispense: 22 g; Refill: 0  -     sulfamethoxazole-trimethoprim (Bactrim DS) 800-160 MG per tablet; Take 1 tablet by mouth 2 (Two) Times a Day.  Dispense: 20 tablet; Refill: 0              Follow Up     Return in about 2 weeks (around 5/26/2025).    Patient was given instructions and counseling regarding her condition or for health maintenance advice. Please see specific information pulled into the AVS if appropriate.
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
## 2025-05-21 ENCOUNTER — OFFICE VISIT (OUTPATIENT)
Dept: PSYCHIATRY | Facility: CLINIC | Age: 45
End: 2025-05-21
Payer: MEDICARE

## 2025-05-21 VITALS
HEART RATE: 98 BPM | WEIGHT: 198.6 LBS | DIASTOLIC BLOOD PRESSURE: 81 MMHG | SYSTOLIC BLOOD PRESSURE: 146 MMHG | BODY MASS INDEX: 36.55 KG/M2 | HEIGHT: 62 IN

## 2025-05-21 DIAGNOSIS — F41.0 PANIC ATTACKS: ICD-10-CM

## 2025-05-21 DIAGNOSIS — F41.1 GENERALIZED ANXIETY DISORDER: ICD-10-CM

## 2025-05-21 DIAGNOSIS — Z63.4 BEREAVEMENT: Primary | ICD-10-CM

## 2025-05-21 DIAGNOSIS — F51.05 INSOMNIA DUE TO MENTAL CONDITION: ICD-10-CM

## 2025-05-21 DIAGNOSIS — Z79.899 OTHER LONG TERM (CURRENT) DRUG THERAPY: ICD-10-CM

## 2025-05-21 DIAGNOSIS — F90.0 ADHD (ATTENTION DEFICIT HYPERACTIVITY DISORDER), INATTENTIVE TYPE: ICD-10-CM

## 2025-05-21 DIAGNOSIS — F33.1 MAJOR DEPRESSIVE DISORDER, RECURRENT EPISODE, MODERATE: ICD-10-CM

## 2025-05-21 RX ORDER — DEXTROAMPHETAMINE SACCHARATE, AMPHETAMINE ASPARTATE, DEXTROAMPHETAMINE SULFATE AND AMPHETAMINE SULFATE 2.5; 2.5; 2.5; 2.5 MG/1; MG/1; MG/1; MG/1
10 TABLET ORAL
Qty: 45 TABLET | Refills: 0 | Status: SHIPPED | OUTPATIENT
Start: 2025-05-27

## 2025-05-21 RX ORDER — DEXTROAMPHETAMINE SACCHARATE, AMPHETAMINE ASPARTATE MONOHYDRATE, DEXTROAMPHETAMINE SULFATE AND AMPHETAMINE SULFATE 6.25; 6.25; 6.25; 6.25 MG/1; MG/1; MG/1; MG/1
25 CAPSULE, EXTENDED RELEASE ORAL DAILY
Qty: 30 CAPSULE | Refills: 0 | Status: SHIPPED | OUTPATIENT
Start: 2025-05-27

## 2025-05-21 RX ORDER — DIAZEPAM 5 MG/1
TABLET ORAL
COMMUNITY
End: 2025-05-21

## 2025-05-21 SDOH — SOCIAL STABILITY - SOCIAL INSECURITY: DISSAPEARANCE AND DEATH OF FAMILY MEMBER: Z63.4

## 2025-06-02 DIAGNOSIS — J30.2 SEASONAL ALLERGIES: ICD-10-CM

## 2025-06-02 RX ORDER — LEVOCETIRIZINE DIHYDROCHLORIDE 5 MG/1
5 TABLET, FILM COATED ORAL EVERY EVENING
Qty: 90 TABLET | Refills: 1 | Status: SHIPPED | OUTPATIENT
Start: 2025-06-02

## 2025-06-02 RX ORDER — MONTELUKAST SODIUM 10 MG/1
10 TABLET ORAL
Qty: 90 TABLET | Refills: 1 | Status: SHIPPED | OUTPATIENT
Start: 2025-06-02

## 2025-06-23 DIAGNOSIS — F90.0 ADHD (ATTENTION DEFICIT HYPERACTIVITY DISORDER), INATTENTIVE TYPE: ICD-10-CM

## 2025-06-23 RX ORDER — DEXTROAMPHETAMINE SACCHARATE, AMPHETAMINE ASPARTATE, DEXTROAMPHETAMINE SULFATE AND AMPHETAMINE SULFATE 2.5; 2.5; 2.5; 2.5 MG/1; MG/1; MG/1; MG/1
10 TABLET ORAL
Qty: 45 TABLET | Refills: 0 | Status: SHIPPED | OUTPATIENT
Start: 2025-06-26 | End: 2025-06-26 | Stop reason: SDUPTHER

## 2025-06-23 RX ORDER — DEXTROAMPHETAMINE SACCHARATE, AMPHETAMINE ASPARTATE MONOHYDRATE, DEXTROAMPHETAMINE SULFATE AND AMPHETAMINE SULFATE 6.25; 6.25; 6.25; 6.25 MG/1; MG/1; MG/1; MG/1
25 CAPSULE, EXTENDED RELEASE ORAL DAILY
Qty: 30 CAPSULE | Refills: 0 | Status: SHIPPED | OUTPATIENT
Start: 2025-06-26

## 2025-06-23 NOTE — TELEPHONE ENCOUNTER
Appointment with Martine Guzmna MD (07/11/2025)     Urine Drug Screen - Urine, Clean Catch (10/01/2024 14:30)     CONTROLLED SUBSTANCE AGREEMENT - SCAN (10/01/2024)     Patient needs a refill.  Order pended

## 2025-06-24 NOTE — TELEPHONE ENCOUNTER
PT PHONED IN AND LVM.    PT STATES THAT SHE GOT A CALL THAT HER MEDICATIONS HAD BEEN APPROVED. WHEN SHE CALLED THE PHARMACY THEY TOLD HER THEY DIDN'T HAVE ANYTHING FROM US.

## 2025-06-24 NOTE — TELEPHONE ENCOUNTER
I ATTEMPTED TO CONTACT PT VIA TELEPHONE.  1ST ATTEMPT.    NO ANSWER.    VOICEMAIL MESSAGE LEFT REQUESTING THAT PT CALL OUR OFFICE BACK.

## 2025-06-25 DIAGNOSIS — I70.213 ATHEROSCLEROSIS OF NATIVE ARTERIES OF EXTREMITIES WITH INTERMITTENT CLAUDICATION, BILATERAL LEGS: ICD-10-CM

## 2025-06-25 RX ORDER — CLOPIDOGREL BISULFATE 75 MG/1
75 TABLET ORAL DAILY
Qty: 90 TABLET | Refills: 3 | Status: SHIPPED | OUTPATIENT
Start: 2025-06-25 | End: 2026-06-20

## 2025-06-25 NOTE — TELEPHONE ENCOUNTER
CALLED PT BACK.  SHE HAD CALLED [facility]OOK[facility] FOR HER MEDICATION.  sHE CALLED [facility] AND FOUND OUT THAT IT WOULD BE AVAILABLE TOMORROW.  NO FURTHER QUESTIONS AT THIS TIME.

## 2025-06-26 RX ORDER — DEXTROAMPHETAMINE SACCHARATE, AMPHETAMINE ASPARTATE, DEXTROAMPHETAMINE SULFATE AND AMPHETAMINE SULFATE 2.5; 2.5; 2.5; 2.5 MG/1; MG/1; MG/1; MG/1
TABLET ORAL
Qty: 45 TABLET | Refills: 0 | Status: SHIPPED | OUTPATIENT
Start: 2025-06-26

## 2025-06-26 NOTE — TELEPHONE ENCOUNTER
PT(PATIENT) VERIFIED     PHARMACY IS OUT OF STOCK     amphetamine-dextroamphetamine (Adderall) 10 MG tablet (2025)     PT(PATIENT) IS REQUESTING FOR SCRIPT TO BE RESENT TO [facility]     ALSO, PT(PATIENT) STATES SHE IS SUPPOSED TO TAKE 1.5 TABS A DAY NOT 1 TAB A DAY     PT(PATIENT) STATES INSURANCE IS REFUSING TO FILL UNTIL     Office Visit with Martine Guzman MD (2025)   CONTINUE adderall XR 25 mg qam. CONTINUE adderall IR 10 mg qnoon, ADD 5 mg IR qam.     PLEASE ADVISE

## 2025-07-11 ENCOUNTER — OFFICE VISIT (OUTPATIENT)
Dept: PSYCHIATRY | Facility: CLINIC | Age: 45
End: 2025-07-11
Payer: MEDICARE

## 2025-07-11 VITALS
SYSTOLIC BLOOD PRESSURE: 141 MMHG | HEART RATE: 105 BPM | WEIGHT: 198.6 LBS | BODY MASS INDEX: 36.55 KG/M2 | DIASTOLIC BLOOD PRESSURE: 73 MMHG | HEIGHT: 62 IN

## 2025-07-11 DIAGNOSIS — F41.1 GENERALIZED ANXIETY DISORDER: ICD-10-CM

## 2025-07-11 DIAGNOSIS — F90.0 ADHD (ATTENTION DEFICIT HYPERACTIVITY DISORDER), INATTENTIVE TYPE: Primary | ICD-10-CM

## 2025-07-11 DIAGNOSIS — Z79.899 OTHER LONG TERM (CURRENT) DRUG THERAPY: ICD-10-CM

## 2025-07-11 DIAGNOSIS — F41.0 PANIC ATTACKS: ICD-10-CM

## 2025-07-11 DIAGNOSIS — F51.05 INSOMNIA DUE TO MENTAL CONDITION: ICD-10-CM

## 2025-07-11 DIAGNOSIS — F33.1 MAJOR DEPRESSIVE DISORDER, RECURRENT EPISODE, MODERATE: ICD-10-CM

## 2025-07-11 DIAGNOSIS — Z63.4 BEREAVEMENT: ICD-10-CM

## 2025-07-11 RX ORDER — DIAZEPAM 5 MG/1
TABLET ORAL
COMMUNITY
End: 2025-07-11

## 2025-07-11 RX ORDER — DEXTROAMPHETAMINE SACCHARATE, AMPHETAMINE ASPARTATE, DEXTROAMPHETAMINE SULFATE AND AMPHETAMINE SULFATE 2.5; 2.5; 2.5; 2.5 MG/1; MG/1; MG/1; MG/1
TABLET ORAL
Qty: 45 TABLET | Refills: 0 | Status: SHIPPED | OUTPATIENT
Start: 2025-07-26

## 2025-07-11 RX ORDER — ALBUTEROL SULFATE 90 UG/1
2 INHALANT RESPIRATORY (INHALATION) EVERY 4 HOURS PRN
COMMUNITY

## 2025-07-11 RX ORDER — DEXTROAMPHETAMINE SACCHARATE, AMPHETAMINE ASPARTATE MONOHYDRATE, DEXTROAMPHETAMINE SULFATE AND AMPHETAMINE SULFATE 6.25; 6.25; 6.25; 6.25 MG/1; MG/1; MG/1; MG/1
25 CAPSULE, EXTENDED RELEASE ORAL DAILY
Qty: 30 CAPSULE | Refills: 0 | Status: SHIPPED | OUTPATIENT
Start: 2025-07-26

## 2025-07-11 RX ORDER — ESCITALOPRAM OXALATE 10 MG/1
TABLET ORAL
COMMUNITY
End: 2025-07-11

## 2025-07-11 SDOH — SOCIAL STABILITY - SOCIAL INSECURITY: DISSAPEARANCE AND DEATH OF FAMILY MEMBER: Z63.4

## 2025-07-11 NOTE — TREATMENT PLAN
Anxiety:  2/10 progressing    Goals:  Patient will develop and implement behavioral and cognitive strategies to reduce anxiety and irrational fears, monthly, using Rogerian psychotherapy and CBT where appropriate.  Help patient explore past emotional issues in relation to present anxiety, monthly, until remission of symptoms, using Rogerian psychotherapy and CBT where appropriate.  Help patient develop an awareness of their cognitive and physical responses to anxiety, monthly, until remission of symptoms, using Rogerian psychotherapy and CBT where appropriate.          Depression:  3/10 progressing    Goals:  Patient will demonstrate the ability to initiate new constructive life skills outside of sessions on a consistent basis, monthly, using Rogerian psychotherapy and CBT where appropriate.  Assist patient in setting attainable activities of daily living goals, monthly, using Rogerian psychotherapy and CBT where appropriate.  Provide education about depression, monthly, using Rogerian psychotherapy and CBT where appropriate.  Assist patient in developing healthy coping strategies, monthly, using Rogerian psychotherapy and CBT where appropriate.    Rogerian psychotherapy and CBT will be used to help accomplish the above goals and manage depression and anxiety related to relationships, family conflict, grief, spending sprees and the trouble they cause, recent trauma witnessed       I have discussed and reviewed this treatment plan with the patient.      Reviewed by Martine Guzman MD   07/11/2025
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
## 2025-07-23 ENCOUNTER — TELEPHONE (OUTPATIENT)
Dept: PSYCHIATRY | Facility: CLINIC | Age: 45
End: 2025-07-23

## 2025-07-23 ENCOUNTER — TRANSCRIBE ORDERS (OUTPATIENT)
Age: 45
End: 2025-07-23
Payer: MEDICARE

## 2025-07-23 DIAGNOSIS — I73.9 CLAUDICATION: Primary | ICD-10-CM

## 2025-07-23 DIAGNOSIS — F90.0 ADHD (ATTENTION DEFICIT HYPERACTIVITY DISORDER), INATTENTIVE TYPE: ICD-10-CM

## 2025-08-22 ENCOUNTER — OFFICE VISIT (OUTPATIENT)
Dept: PSYCHIATRY | Facility: CLINIC | Age: 45
End: 2025-08-22
Payer: MEDICARE

## 2025-08-22 VITALS
DIASTOLIC BLOOD PRESSURE: 79 MMHG | HEART RATE: 104 BPM | SYSTOLIC BLOOD PRESSURE: 150 MMHG | OXYGEN SATURATION: 98 % | HEIGHT: 62 IN | BODY MASS INDEX: 36.14 KG/M2 | WEIGHT: 196.4 LBS

## 2025-08-22 DIAGNOSIS — Z79.899 OTHER LONG TERM (CURRENT) DRUG THERAPY: ICD-10-CM

## 2025-08-22 DIAGNOSIS — F41.1 GENERALIZED ANXIETY DISORDER: ICD-10-CM

## 2025-08-22 DIAGNOSIS — F33.1 MAJOR DEPRESSIVE DISORDER, RECURRENT EPISODE, MODERATE: ICD-10-CM

## 2025-08-22 DIAGNOSIS — F41.0 PANIC ATTACKS: ICD-10-CM

## 2025-08-22 DIAGNOSIS — F90.0 ADHD (ATTENTION DEFICIT HYPERACTIVITY DISORDER), INATTENTIVE TYPE: Primary | ICD-10-CM

## 2025-08-22 DIAGNOSIS — Z63.4 BEREAVEMENT: ICD-10-CM

## 2025-08-22 DIAGNOSIS — F51.05 INSOMNIA DUE TO MENTAL CONDITION: ICD-10-CM

## 2025-08-22 RX ORDER — DEXTROAMPHETAMINE SACCHARATE, AMPHETAMINE ASPARTATE MONOHYDRATE, DEXTROAMPHETAMINE SULFATE AND AMPHETAMINE SULFATE 6.25; 6.25; 6.25; 6.25 MG/1; MG/1; MG/1; MG/1
25 CAPSULE, EXTENDED RELEASE ORAL DAILY
Qty: 30 CAPSULE | Refills: 0 | Status: SHIPPED | OUTPATIENT
Start: 2025-08-25

## 2025-08-22 RX ORDER — DEXTROAMPHETAMINE SACCHARATE, AMPHETAMINE ASPARTATE, DEXTROAMPHETAMINE SULFATE AND AMPHETAMINE SULFATE 2.5; 2.5; 2.5; 2.5 MG/1; MG/1; MG/1; MG/1
10 TABLET ORAL 2 TIMES DAILY
Qty: 60 TABLET | Refills: 0 | Status: SHIPPED | OUTPATIENT
Start: 2025-08-25

## 2025-08-22 SDOH — SOCIAL STABILITY - SOCIAL INSECURITY: DISSAPEARANCE AND DEATH OF FAMILY MEMBER: Z63.4

## 2025-08-27 ENCOUNTER — OFFICE VISIT (OUTPATIENT)
Dept: FAMILY MEDICINE CLINIC | Facility: CLINIC | Age: 45
End: 2025-08-27
Payer: MEDICARE

## 2025-08-27 VITALS
BODY MASS INDEX: 36.25 KG/M2 | RESPIRATION RATE: 16 BRPM | HEIGHT: 62 IN | OXYGEN SATURATION: 100 % | DIASTOLIC BLOOD PRESSURE: 72 MMHG | TEMPERATURE: 97.6 F | SYSTOLIC BLOOD PRESSURE: 116 MMHG | HEART RATE: 106 BPM | WEIGHT: 197 LBS

## 2025-08-27 DIAGNOSIS — G60.9 IDIOPATHIC PERIPHERAL NEUROPATHY: ICD-10-CM

## 2025-08-27 DIAGNOSIS — K21.9 GASTROESOPHAGEAL REFLUX DISEASE WITHOUT ESOPHAGITIS: ICD-10-CM

## 2025-08-27 DIAGNOSIS — J06.9 ACUTE URI: ICD-10-CM

## 2025-08-27 DIAGNOSIS — E78.00 HIGH CHOLESTEROL: ICD-10-CM

## 2025-08-27 DIAGNOSIS — R52 BODY ACHES: Primary | ICD-10-CM

## 2025-08-27 LAB
EXPIRATION DATE: NORMAL
INTERNAL CONTROL: NORMAL
Lab: NORMAL
SARS-COV-2 AG UPPER RESP QL IA.RAPID: NOT DETECTED

## 2025-08-27 RX ORDER — OMEPRAZOLE 40 MG/1
40 CAPSULE, DELAYED RELEASE ORAL DAILY
Qty: 90 CAPSULE | Refills: 1 | Status: SHIPPED | OUTPATIENT
Start: 2025-08-27

## 2025-08-27 RX ORDER — OMEPRAZOLE 40 MG/1
40 CAPSULE, DELAYED RELEASE ORAL DAILY
Qty: 90 CAPSULE | Refills: 1 | Status: CANCELLED | OUTPATIENT
Start: 2025-08-27

## 2025-08-27 RX ORDER — PRAVASTATIN SODIUM 80 MG/1
80 TABLET ORAL DAILY
Qty: 90 TABLET | Refills: 1 | Status: SHIPPED | OUTPATIENT
Start: 2025-08-27

## 2025-08-27 RX ORDER — PRAVASTATIN SODIUM 80 MG/1
80 TABLET ORAL DAILY
Qty: 90 TABLET | Refills: 1 | Status: CANCELLED | OUTPATIENT
Start: 2025-08-27

## 2025-08-27 RX ORDER — FLUCONAZOLE 150 MG/1
150 TABLET ORAL DAILY
Qty: 2 TABLET | Refills: 0 | Status: SHIPPED | OUTPATIENT
Start: 2025-08-27

## (undated) DEVICE — CONN JET HYDRA H20 AUXILIARY DISP

## (undated) DEVICE — SINGLE-USE BIOPSY FORCEPS: Brand: RADIAL JAW 4

## (undated) DEVICE — STERILE POLYISOPRENE POWDER-FREE SURGICAL GLOVES WITH EMOLLIENT COATING: Brand: PROTEXIS

## (undated) DEVICE — BLCK/BITE BLOX WO/DENTL/RIM W/STRAP 54F

## (undated) DEVICE — SOL IRRG H2O PL/BG 1000ML STRL

## (undated) DEVICE — SOL IRR NACL 0.9PCT BT 1000ML

## (undated) DEVICE — LINER SURG CANSTR SXN S/RIGD 1500CC

## (undated) DEVICE — GLV SURG SENSICARE PI ORTHO SZ8 LF STRL

## (undated) DEVICE — GOWN,REINFRCE,POLY,SIRUS,BREATH SLV,XXLG: Brand: MEDLINE

## (undated) DEVICE — SOLIDIFIER LIQLOC PLS 1500CC BT

## (undated) DEVICE — Device

## (undated) DEVICE — Device: Brand: DEFENDO AIR/WATER/SUCTION AND BIOPSY VALVE